# Patient Record
Sex: FEMALE | Race: WHITE | Employment: UNEMPLOYED | URBAN - METROPOLITAN AREA
[De-identification: names, ages, dates, MRNs, and addresses within clinical notes are randomized per-mention and may not be internally consistent; named-entity substitution may affect disease eponyms.]

---

## 2017-06-07 ENCOUNTER — HOSPITAL ENCOUNTER (EMERGENCY)
Age: 47
Discharge: HOME | End: 2017-06-07
Attending: EMERGENCY MEDICINE

## 2017-06-07 ENCOUNTER — APPOINTMENT (OUTPATIENT)
Dept: GENERAL RADIOLOGY | Age: 47
End: 2017-06-07

## 2017-06-07 VITALS
BODY MASS INDEX: 23.32 KG/M2 | DIASTOLIC BLOOD PRESSURE: 70 MMHG | WEIGHT: 140 LBS | HEIGHT: 65 IN | OXYGEN SATURATION: 98 % | TEMPERATURE: 98.5 F | HEART RATE: 91 BPM | RESPIRATION RATE: 16 BRPM | SYSTOLIC BLOOD PRESSURE: 153 MMHG

## 2017-06-07 DIAGNOSIS — J40 BRONCHITIS: Primary | ICD-10-CM

## 2017-06-07 DIAGNOSIS — J45.901 ASTHMA FLARE: ICD-10-CM

## 2017-06-07 PROCEDURE — 6370000000 HC RX 637 (ALT 250 FOR IP): Performed by: EMERGENCY MEDICINE

## 2017-06-07 PROCEDURE — 6360000002 HC RX W HCPCS: Performed by: EMERGENCY MEDICINE

## 2017-06-07 PROCEDURE — 6370000000 HC RX 637 (ALT 250 FOR IP)

## 2017-06-07 PROCEDURE — 71020 XR CHEST STANDARD TWO VW: CPT

## 2017-06-07 PROCEDURE — 94640 AIRWAY INHALATION TREATMENT: CPT

## 2017-06-07 PROCEDURE — 99284 EMERGENCY DEPT VISIT MOD MDM: CPT

## 2017-06-07 RX ORDER — PREDNISONE 20 MG/1
40 TABLET ORAL DAILY
Qty: 10 TABLET | Refills: 0 | Status: SHIPPED | OUTPATIENT
Start: 2017-06-07 | End: 2017-06-12

## 2017-06-07 RX ORDER — AZITHROMYCIN 250 MG/1
TABLET, FILM COATED ORAL
Qty: 6 TABLET | Refills: 0 | Status: SHIPPED | OUTPATIENT
Start: 2017-06-07 | End: 2017-06-07

## 2017-06-07 RX ORDER — IPRATROPIUM BROMIDE AND ALBUTEROL SULFATE 2.5; .5 MG/3ML; MG/3ML
SOLUTION RESPIRATORY (INHALATION)
Status: COMPLETED
Start: 2017-06-07 | End: 2017-06-07

## 2017-06-07 RX ORDER — IPRATROPIUM BROMIDE AND ALBUTEROL SULFATE 2.5; .5 MG/3ML; MG/3ML
1 SOLUTION RESPIRATORY (INHALATION) ONCE
Status: COMPLETED | OUTPATIENT
Start: 2017-06-07 | End: 2017-06-07

## 2017-06-07 RX ORDER — SULFAMETHOXAZOLE AND TRIMETHOPRIM 800; 160 MG/1; MG/1
1 TABLET ORAL 2 TIMES DAILY
Qty: 20 TABLET | Refills: 0 | Status: SHIPPED | OUTPATIENT
Start: 2017-06-07 | End: 2017-06-17

## 2017-06-07 RX ORDER — IBUPROFEN 600 MG/1
600 TABLET ORAL ONCE
Status: COMPLETED | OUTPATIENT
Start: 2017-06-07 | End: 2017-06-07

## 2017-06-07 RX ORDER — DEXAMETHASONE 4 MG/1
8 TABLET ORAL ONCE
Status: COMPLETED | OUTPATIENT
Start: 2017-06-07 | End: 2017-06-07

## 2017-06-07 RX ADMIN — IPRATROPIUM BROMIDE AND ALBUTEROL SULFATE 1 AMPULE: 2.5; .5 SOLUTION RESPIRATORY (INHALATION) at 11:49

## 2017-06-07 RX ADMIN — DEXAMETHASONE 8 MG: 4 TABLET ORAL at 11:50

## 2017-06-07 RX ADMIN — IPRATROPIUM BROMIDE AND ALBUTEROL SULFATE 1 AMPULE: .5; 3 SOLUTION RESPIRATORY (INHALATION) at 13:15

## 2017-06-07 RX ADMIN — IBUPROFEN 600 MG: 600 TABLET ORAL at 13:13

## 2017-06-07 RX ADMIN — IPRATROPIUM BROMIDE AND ALBUTEROL SULFATE 1 AMPULE: .5; 3 SOLUTION RESPIRATORY (INHALATION) at 11:49

## 2017-06-07 ASSESSMENT — PAIN DESCRIPTION - ORIENTATION: ORIENTATION: POSTERIOR

## 2017-06-07 ASSESSMENT — ENCOUNTER SYMPTOMS
SINUS PRESSURE: 0
ABDOMINAL PAIN: 0
VOMITING: 0
TROUBLE SWALLOWING: 0
CHOKING: 0
EYE DISCHARGE: 0
VOICE CHANGE: 0
COUGH: 0
EYE PAIN: 0
FACIAL SWELLING: 0
WHEEZING: 1
DIARRHEA: 0
BACK PAIN: 0
SHORTNESS OF BREATH: 1
EYE REDNESS: 0
SORE THROAT: 0
RHINORRHEA: 1
CHEST TIGHTNESS: 0
BLOOD IN STOOL: 0
CONSTIPATION: 0
STRIDOR: 0

## 2017-06-07 ASSESSMENT — PAIN DESCRIPTION - PAIN TYPE: TYPE: ACUTE PAIN

## 2017-06-07 ASSESSMENT — PAIN DESCRIPTION - DESCRIPTORS: DESCRIPTORS: BURNING

## 2017-06-07 ASSESSMENT — PAIN DESCRIPTION - LOCATION: LOCATION: CHEST

## 2017-06-07 ASSESSMENT — PAIN SCALES - GENERAL
PAINLEVEL_OUTOF10: 6
PAINLEVEL_OUTOF10: 6

## 2017-06-07 ASSESSMENT — PAIN SCALES - WONG BAKER: WONGBAKER_NUMERICALRESPONSE: 6

## 2018-01-23 ENCOUNTER — OFFICE VISIT (OUTPATIENT)
Dept: FAMILY MEDICINE CLINIC | Age: 48
End: 2018-01-23
Payer: COMMERCIAL

## 2018-01-23 VITALS
BODY MASS INDEX: 26.33 KG/M2 | RESPIRATION RATE: 16 BRPM | TEMPERATURE: 98.5 F | HEIGHT: 65 IN | SYSTOLIC BLOOD PRESSURE: 136 MMHG | HEART RATE: 89 BPM | WEIGHT: 158 LBS | DIASTOLIC BLOOD PRESSURE: 88 MMHG | OXYGEN SATURATION: 98 %

## 2018-01-23 DIAGNOSIS — J20.8 ACUTE BRONCHITIS DUE TO OTHER SPECIFIED ORGANISMS: ICD-10-CM

## 2018-01-23 DIAGNOSIS — Z72.0 TOBACCO ABUSE: ICD-10-CM

## 2018-01-23 DIAGNOSIS — J45.901 ASTHMA EXACERBATION, MILD: ICD-10-CM

## 2018-01-23 DIAGNOSIS — R05.9 COUGH: Primary | ICD-10-CM

## 2018-01-23 LAB
INFLUENZA A ANTIBODY: NORMAL
INFLUENZA B ANTIBODY: NORMAL

## 2018-01-23 PROCEDURE — 87804 INFLUENZA ASSAY W/OPTIC: CPT | Performed by: FAMILY MEDICINE

## 2018-01-23 PROCEDURE — 99214 OFFICE O/P EST MOD 30 MIN: CPT | Performed by: FAMILY MEDICINE

## 2018-01-23 PROCEDURE — 4004F PT TOBACCO SCREEN RCVD TLK: CPT | Performed by: FAMILY MEDICINE

## 2018-01-23 PROCEDURE — G8484 FLU IMMUNIZE NO ADMIN: HCPCS | Performed by: FAMILY MEDICINE

## 2018-01-23 PROCEDURE — G8419 CALC BMI OUT NRM PARAM NOF/U: HCPCS | Performed by: FAMILY MEDICINE

## 2018-01-23 PROCEDURE — G8427 DOCREV CUR MEDS BY ELIG CLIN: HCPCS | Performed by: FAMILY MEDICINE

## 2018-01-23 RX ORDER — AZITHROMYCIN 250 MG/1
TABLET, FILM COATED ORAL
Qty: 1 PACKET | Refills: 0 | Status: SHIPPED | OUTPATIENT
Start: 2018-01-23 | End: 2018-02-02

## 2018-01-23 RX ORDER — METHYLPREDNISOLONE 4 MG/1
TABLET ORAL
Qty: 1 KIT | Refills: 0 | Status: SHIPPED | OUTPATIENT
Start: 2018-01-23 | End: 2018-01-29

## 2018-01-23 ASSESSMENT — PATIENT HEALTH QUESTIONNAIRE - PHQ9
2. FEELING DOWN, DEPRESSED OR HOPELESS: 0
1. LITTLE INTEREST OR PLEASURE IN DOING THINGS: 0
SUM OF ALL RESPONSES TO PHQ QUESTIONS 1-9: 0
SUM OF ALL RESPONSES TO PHQ9 QUESTIONS 1 & 2: 0

## 2018-01-23 NOTE — PROGRESS NOTES
1/23/2018   Medication Sig Dispense Refill    methylPREDNISolone (MEDROL DOSEPACK) 4 MG tablet Take by mouth. 1 kit 0    azithromycin (ZITHROMAX) 250 MG tablet Take 2 tabs (500 mg) on Day 1, and take 1 tab (250 mg) on days 2 through 5. 1 packet 0    albuterol sulfate  (90 BASE) MCG/ACT inhaler Inhale 2 puffs into the lungs every 6 hours as needed for Wheezing 1 Inhaler 5    ipratropium-albuterol (DUONEB) 0.5-2.5 (3) MG/3ML SOLN nebulizer solution Inhale 3 mLs into the lungs every 6 hours 360 mL 5    acetaminophen (TYLENOL) 500 MG tablet Take 500 mg by mouth every 6 hours as needed for Pain      albuterol (PROVENTIL) (2.5 MG/3ML) 0.083% nebulizer solution Take 3 mLs by nebulization every 6 hours as needed for Wheezing. 120 each 3    [DISCONTINUED] loratadine-pseudoephedrine (CLARITIN-D 12HR) 5-120 MG per extended release tablet Take 1 tablet by mouth 2 times daily 20 tablet 0    [DISCONTINUED] traMADol (ULTRAM) 50 MG tablet 1-2 tabs PO q 6 hours prn pain 40 tablet 0    [DISCONTINUED] ibuprofen (ADVIL;MOTRIN) 800 MG tablet take 1 tablet by mouth every 8 hours if needed for pain 90 tablet 1    nicotine (NICODERM CQ) 21 MG/24HR Place 1 patch onto the skin every 24 hours. 30 patch 3     No facility-administered encounter medications on file as of 1/23/2018. stop smoking  Patient declines further testing  Recommend stay current with health maintenance      Return if symptoms worsen or fail to improve. Patient education provided. They understand and agree with this course of treatment. They will return with new or worsening symptoms. Patient instructed to remain current with appropriate annual health maintenance.

## 2018-01-25 ENCOUNTER — TELEPHONE (OUTPATIENT)
Dept: FAMILY MEDICINE CLINIC | Age: 48
End: 2018-01-25

## 2018-01-25 NOTE — TELEPHONE ENCOUNTER
Patient was in office 01/23/2018 and was given medications and has been taking them since day of visit, 48 hours and she has not improved and is feeling worse would like some advice on what she should do     please advise     pt ph: [phone]

## 2018-01-29 ENCOUNTER — TELEPHONE (OUTPATIENT)
Dept: FAMILY MEDICINE CLINIC | Age: 48
End: 2018-01-29

## 2018-01-30 ENCOUNTER — TELEPHONE (OUTPATIENT)
Dept: FAMILY MEDICINE CLINIC | Age: 48
End: 2018-01-30

## 2018-01-30 RX ORDER — NICOTINE 21 MG/24HR
1 PATCH, TRANSDERMAL 24 HOURS TRANSDERMAL EVERY 24 HOURS
Qty: 30 PATCH | Refills: 3 | Status: SHIPPED | OUTPATIENT
Start: 2018-01-30 | End: 2018-04-02

## 2018-01-30 RX ORDER — ALBUTEROL SULFATE 2.5 MG/3ML
2.5 SOLUTION RESPIRATORY (INHALATION) EVERY 6 HOURS PRN
Qty: 120 EACH | Refills: 3 | Status: SHIPPED | OUTPATIENT
Start: 2018-01-30

## 2018-02-02 ENCOUNTER — OFFICE VISIT (OUTPATIENT)
Dept: FAMILY MEDICINE CLINIC | Age: 48
End: 2018-02-02
Payer: COMMERCIAL

## 2018-02-02 VITALS
SYSTOLIC BLOOD PRESSURE: 130 MMHG | HEIGHT: 65 IN | WEIGHT: 156 LBS | DIASTOLIC BLOOD PRESSURE: 78 MMHG | BODY MASS INDEX: 25.99 KG/M2 | RESPIRATION RATE: 16 BRPM | OXYGEN SATURATION: 99 % | TEMPERATURE: 97.5 F | HEART RATE: 95 BPM

## 2018-02-02 DIAGNOSIS — M54.50 CHRONIC BILATERAL LOW BACK PAIN WITHOUT SCIATICA: ICD-10-CM

## 2018-02-02 DIAGNOSIS — G89.29 CHRONIC BILATERAL LOW BACK PAIN WITHOUT SCIATICA: ICD-10-CM

## 2018-02-02 DIAGNOSIS — M94.0 COSTOCHONDRITIS, ACUTE: ICD-10-CM

## 2018-02-02 DIAGNOSIS — F32.A DEPRESSION, UNSPECIFIED DEPRESSION TYPE: ICD-10-CM

## 2018-02-02 DIAGNOSIS — J20.9 ACUTE BRONCHITIS, UNSPECIFIED ORGANISM: Primary | ICD-10-CM

## 2018-02-02 DIAGNOSIS — J45.901 ASTHMA EXACERBATION, MILD: ICD-10-CM

## 2018-02-02 DIAGNOSIS — Z72.0 TOBACCO ABUSE: ICD-10-CM

## 2018-02-02 PROCEDURE — G8484 FLU IMMUNIZE NO ADMIN: HCPCS | Performed by: FAMILY MEDICINE

## 2018-02-02 PROCEDURE — 99214 OFFICE O/P EST MOD 30 MIN: CPT | Performed by: FAMILY MEDICINE

## 2018-02-02 PROCEDURE — G8419 CALC BMI OUT NRM PARAM NOF/U: HCPCS | Performed by: FAMILY MEDICINE

## 2018-02-02 PROCEDURE — 4004F PT TOBACCO SCREEN RCVD TLK: CPT | Performed by: FAMILY MEDICINE

## 2018-02-02 PROCEDURE — G8427 DOCREV CUR MEDS BY ELIG CLIN: HCPCS | Performed by: FAMILY MEDICINE

## 2018-02-02 RX ORDER — LOSARTAN POTASSIUM AND HYDROCHLOROTHIAZIDE 12.5; 5 MG/1; MG/1
1 TABLET ORAL DAILY
COMMUNITY
End: 2018-03-05 | Stop reason: ALTCHOICE

## 2018-02-02 RX ORDER — DOXYCYCLINE 100 MG/1
CAPSULE ORAL
COMMUNITY
End: 2018-02-05 | Stop reason: ALTCHOICE

## 2018-02-02 RX ORDER — NICOTINE 21 MG/24HR
1 PATCH, TRANSDERMAL 24 HOURS TRANSDERMAL
COMMUNITY
Start: 2014-01-16 | End: 2018-02-02

## 2018-02-02 RX ORDER — METOPROLOL SUCCINATE 25 MG/1
25 TABLET, EXTENDED RELEASE ORAL DAILY
COMMUNITY
End: 2018-03-05 | Stop reason: ALTCHOICE

## 2018-02-02 RX ORDER — IPRATROPIUM BROMIDE AND ALBUTEROL SULFATE 2.5; .5 MG/3ML; MG/3ML
3 SOLUTION RESPIRATORY (INHALATION)
COMMUNITY
Start: 2014-01-16 | End: 2018-02-02

## 2018-02-05 ENCOUNTER — OFFICE VISIT (OUTPATIENT)
Dept: CARDIOLOGY CLINIC | Age: 48
End: 2018-02-05
Payer: COMMERCIAL

## 2018-02-05 VITALS
HEIGHT: 65 IN | BODY MASS INDEX: 26.49 KG/M2 | WEIGHT: 159 LBS | DIASTOLIC BLOOD PRESSURE: 82 MMHG | SYSTOLIC BLOOD PRESSURE: 134 MMHG | HEART RATE: 84 BPM | OXYGEN SATURATION: 97 %

## 2018-02-05 DIAGNOSIS — F17.200 SMOKER: ICD-10-CM

## 2018-02-05 DIAGNOSIS — I10 HYPERTENSION, UNSPECIFIED TYPE: ICD-10-CM

## 2018-02-05 DIAGNOSIS — R07.9 CHEST PAIN, UNSPECIFIED TYPE: Primary | ICD-10-CM

## 2018-02-05 DIAGNOSIS — Z09 HOSPITAL DISCHARGE FOLLOW-UP: ICD-10-CM

## 2018-02-05 PROCEDURE — G8484 FLU IMMUNIZE NO ADMIN: HCPCS | Performed by: INTERNAL MEDICINE

## 2018-02-05 PROCEDURE — 4004F PT TOBACCO SCREEN RCVD TLK: CPT | Performed by: INTERNAL MEDICINE

## 2018-02-05 PROCEDURE — G8419 CALC BMI OUT NRM PARAM NOF/U: HCPCS | Performed by: INTERNAL MEDICINE

## 2018-02-05 PROCEDURE — 99214 OFFICE O/P EST MOD 30 MIN: CPT | Performed by: INTERNAL MEDICINE

## 2018-02-05 PROCEDURE — G8427 DOCREV CUR MEDS BY ELIG CLIN: HCPCS | Performed by: INTERNAL MEDICINE

## 2018-02-05 ASSESSMENT — ENCOUNTER SYMPTOMS
CHEST TIGHTNESS: 0
APNEA: 0
SHORTNESS OF BREATH: 1

## 2018-02-13 ENCOUNTER — TELEPHONE (OUTPATIENT)
Dept: FAMILY MEDICINE CLINIC | Age: 48
End: 2018-02-13

## 2018-02-13 NOTE — TELEPHONE ENCOUNTER
Pt started new meds last night in connection with the lung program. She took prednisone and used albuterol inhaler. She was advised today by RN at lung program to begin nebulizer treatments and to contact our office. Pt c/o sob and sounds breathless and anxious. She states she is nervous and afraid. She reports O2 of 97 and pulse 94. After talking with her and calming her she is more relaxed and breathing has calmed somewhat. She is advised to go to er if symptoms increase and to cover her face with a scarf.

## 2018-02-19 ENCOUNTER — OFFICE VISIT (OUTPATIENT)
Dept: FAMILY MEDICINE CLINIC | Age: 48
End: 2018-02-19
Payer: COMMERCIAL

## 2018-02-19 VITALS
RESPIRATION RATE: 18 BRPM | HEIGHT: 65 IN | HEART RATE: 78 BPM | WEIGHT: 164 LBS | SYSTOLIC BLOOD PRESSURE: 132 MMHG | TEMPERATURE: 98.3 F | BODY MASS INDEX: 27.32 KG/M2 | DIASTOLIC BLOOD PRESSURE: 86 MMHG

## 2018-02-19 DIAGNOSIS — Z72.0 TOBACCO ABUSE: ICD-10-CM

## 2018-02-19 DIAGNOSIS — E87.6 HYPOKALEMIA: ICD-10-CM

## 2018-02-19 DIAGNOSIS — M94.0 COSTOCHONDRITIS, ACUTE: ICD-10-CM

## 2018-02-19 DIAGNOSIS — J45.901 ASTHMA EXACERBATION, MILD: Primary | ICD-10-CM

## 2018-02-19 DIAGNOSIS — F32.A DEPRESSION, UNSPECIFIED DEPRESSION TYPE: ICD-10-CM

## 2018-02-19 DIAGNOSIS — M54.50 CHRONIC BILATERAL LOW BACK PAIN WITHOUT SCIATICA: ICD-10-CM

## 2018-02-19 DIAGNOSIS — R07.9 CHEST PAIN, UNSPECIFIED TYPE: ICD-10-CM

## 2018-02-19 DIAGNOSIS — G89.29 CHRONIC BILATERAL LOW BACK PAIN WITHOUT SCIATICA: ICD-10-CM

## 2018-02-19 LAB
ANION GAP SERPL CALCULATED.3IONS-SCNC: 15 MEQ/L (ref 7–13)
BUN BLDV-MCNC: 20 MG/DL (ref 6–20)
CALCIUM SERPL-MCNC: 9.3 MG/DL (ref 8.6–10.2)
CHLORIDE BLD-SCNC: 100 MEQ/L (ref 98–107)
CO2: 25 MEQ/L (ref 22–29)
CREAT SERPL-MCNC: 0.68 MG/DL (ref 0.5–0.9)
GFR AFRICAN AMERICAN: >60
GFR NON-AFRICAN AMERICAN: >60
GLUCOSE BLD-MCNC: 76 MG/DL (ref 74–109)
POTASSIUM SERPL-SCNC: 5.3 MEQ/L (ref 3.5–5.1)
SODIUM BLD-SCNC: 140 MEQ/L (ref 132–144)

## 2018-02-19 PROCEDURE — G8484 FLU IMMUNIZE NO ADMIN: HCPCS | Performed by: FAMILY MEDICINE

## 2018-02-19 PROCEDURE — G8419 CALC BMI OUT NRM PARAM NOF/U: HCPCS | Performed by: FAMILY MEDICINE

## 2018-02-19 PROCEDURE — G8427 DOCREV CUR MEDS BY ELIG CLIN: HCPCS | Performed by: FAMILY MEDICINE

## 2018-02-19 PROCEDURE — 99214 OFFICE O/P EST MOD 30 MIN: CPT | Performed by: FAMILY MEDICINE

## 2018-02-19 PROCEDURE — 4004F PT TOBACCO SCREEN RCVD TLK: CPT | Performed by: FAMILY MEDICINE

## 2018-02-19 RX ORDER — IPRATROPIUM BROMIDE AND ALBUTEROL SULFATE 2.5; .5 MG/3ML; MG/3ML
1 SOLUTION RESPIRATORY (INHALATION) EVERY 6 HOURS
Qty: 360 ML | Refills: 5 | Status: SHIPPED | OUTPATIENT
Start: 2018-02-19

## 2018-02-19 NOTE — PROGRESS NOTES
tenderness, deformity or swelling  Extremities - peripheral pulses normal, no pedal edema, no clubbing or cyanosis  Skin - normal coloration and turgor, no rashes, no suspicious skin lesions noted  Influenza testing negative  Patient declines blood work or chest x-ray;    Assessment:    1. Asthma exacerbation, mild     2. Costochondritis, acute     3. Depression, unspecified depression type     4. Chronic bilateral low back pain without sciatica     5. Tobacco abuse     6. Chest pain, unspecified type     7. Hypokalemia  Basic Metabolic Panel       Plan:    Orders Placed This Encounter   Procedures    Basic Metabolic Panel     Standing Status:   Future     Number of Occurrences:   1     Standing Expiration Date:   2/19/2019       Outpatient Encounter Prescriptions as of 2/19/2018   Medication Sig Dispense Refill    ipratropium-albuterol (DUONEB) 0.5-2.5 (3) MG/3ML SOLN nebulizer solution Inhale 3 mLs into the lungs every 6 hours 360 mL 5    losartan-hydrochlorothiazide (HYZAAR) 50-12.5 MG per tablet Take 1 tablet by mouth daily       metoprolol succinate (TOPROL XL) 25 MG extended release tablet Take 25 mg by mouth daily      albuterol (PROVENTIL) (2.5 MG/3ML) 0.083% nebulizer solution Take 3 mLs by nebulization every 6 hours as needed for Wheezing 120 each 3    nicotine (NICODERM CQ) 14 MG/24HR Place 1 patch onto the skin every 24 hours 30 patch 3    albuterol sulfate  (90 BASE) MCG/ACT inhaler Inhale 2 puffs into the lungs every 6 hours as needed for Wheezing 1 Inhaler 5    [DISCONTINUED] ipratropium-albuterol (DUONEB) 0.5-2.5 (3) MG/3ML SOLN nebulizer solution Inhale 3 mLs into the lungs every 6 hours 360 mL 5    acetaminophen (TYLENOL) 500 MG tablet Take 500 mg by mouth every 6 hours as needed for Pain       No facility-administered encounter medications on file as of 2/19/2018.          stop smoking  Patient declines further testing  Recommend stay current with health maintenance      Return in

## 2018-02-20 ENCOUNTER — TELEPHONE (OUTPATIENT)
Dept: FAMILY MEDICINE CLINIC | Age: 48
End: 2018-02-20

## 2018-02-20 NOTE — TELEPHONE ENCOUNTER
PT HAS CALLED In, WANTING TO KNOW RESULTS OF THE [facility]         PLEASE GIVE ALL      PT Ph: [phone]     [facility] YOU

## 2018-03-05 ENCOUNTER — OFFICE VISIT (OUTPATIENT)
Dept: CARDIOLOGY CLINIC | Age: 48
End: 2018-03-05
Payer: COMMERCIAL

## 2018-03-05 VITALS
WEIGHT: 163.2 LBS | HEIGHT: 65 IN | SYSTOLIC BLOOD PRESSURE: 144 MMHG | BODY MASS INDEX: 27.19 KG/M2 | OXYGEN SATURATION: 98 % | DIASTOLIC BLOOD PRESSURE: 92 MMHG | HEART RATE: 100 BPM

## 2018-03-05 DIAGNOSIS — I10 HYPERTENSION, UNSPECIFIED TYPE: Primary | ICD-10-CM

## 2018-03-05 PROCEDURE — G8484 FLU IMMUNIZE NO ADMIN: HCPCS | Performed by: INTERNAL MEDICINE

## 2018-03-05 PROCEDURE — G8419 CALC BMI OUT NRM PARAM NOF/U: HCPCS | Performed by: INTERNAL MEDICINE

## 2018-03-05 PROCEDURE — 99213 OFFICE O/P EST LOW 20 MIN: CPT | Performed by: INTERNAL MEDICINE

## 2018-03-05 PROCEDURE — 4004F PT TOBACCO SCREEN RCVD TLK: CPT | Performed by: INTERNAL MEDICINE

## 2018-03-05 PROCEDURE — G8427 DOCREV CUR MEDS BY ELIG CLIN: HCPCS | Performed by: INTERNAL MEDICINE

## 2018-03-05 ASSESSMENT — ENCOUNTER SYMPTOMS
SHORTNESS OF BREATH: 1
CHEST TIGHTNESS: 0
APNEA: 0

## 2018-03-05 NOTE — PROGRESS NOTES
Reason For Visit:  Chief Complaint   Patient presents with    1 Month Follow-Up       HPI:  3/5/18: Patient presents today for Recent hospitalization for chest pain. Doing better. Starting Chantix. Blood pressure is better no heart failure. EKG is LVH pattern he'll see me in 6 months. 2/5/18: Patient presents today for Follow-up of recent hospitalization for chest pain. She used to be a ..Used to stress her Now she works a different job and she is very happy. She is . She was a smoker one pack per day and has cut it down to 5 cigarettes a day. Has hypertension. EKG had shown LVH or a normal variant. Continue with the losartan hydrochlorothiazide. Set her up for stress Cardiolite and echo and then see me.       Problem List:  Patient Active Problem List   Diagnosis    Asthma exacerbation, mild    Costochondritis, acute    Chest pain    Chronic lumbar pain    Depression    Tobacco abuse       Allergies: Allergies   Allergen Reactions    Latex     Pcn [Penicillins]        Personal History:   has a past medical history of Anxiety; Asthma; and Fibromyalgia. Social History:   reports that she has been smoking Cigarettes. She has been smoking about 0.50 packs per day. She has never used smokeless tobacco. She reports that she drinks alcohol. She reports that she does not use drugs. Surgical History:  Past Surgical History:   Procedure Laterality Date    BACK SURGERY      KNEE SURGERY Right     2012    OTHER SURGICAL HISTORY      3 VAGINAL BIRTHS    TUBAL LIGATION      ENDOMETRIOSIS       Family History:  family history includes Arthritis in her mother; Asthma in her mother.       Current Medications:    Current Outpatient Prescriptions:     ipratropium-albuterol (DUONEB) 0.5-2.5 (3) MG/3ML SOLN nebulizer solution, Inhale 3 mLs into the lungs every 6 hours, Disp: 360 mL, Rfl: 5    albuterol (PROVENTIL) (2.5 MG/3ML) 0.083% nebulizer solution, Take 3 mLs by nebulization every 6 hours as needed for Wheezing, Disp: 120 each, Rfl: 3    nicotine (NICODERM CQ) 14 MG/24HR, Place 1 patch onto the skin every 24 hours, Disp: 30 patch, Rfl: 3    albuterol sulfate  (90 BASE) MCG/ACT inhaler, Inhale 2 puffs into the lungs every 6 hours as needed for Wheezing, Disp: 1 Inhaler, Rfl: 5    acetaminophen (TYLENOL) 500 MG tablet, Take 500 mg by mouth every 6 hours as needed for Pain, Disp: , Rfl:       Review of Systems:  Review of Systems   Constitutional: Negative for appetite change, diaphoresis and fatigue. Respiratory: Positive for shortness of breath. Negative for apnea and chest tightness. Cardiovascular: Negative for chest pain, palpitations and leg swelling. Skin: Negative. Neurological: Negative for dizziness, syncope, weakness, light-headedness and headaches. Psychiatric/Behavioral: Negative for agitation, behavioral problems, confusion and decreased concentration. The patient is not nervous/anxious and is not hyperactive. All other systems reviewed and are negative. Objective  Vitals:    03/05/18 1556   BP: (!) 144/92   Site: Right Arm   Position: Sitting   Cuff Size: Small Adult   Pulse: 100   SpO2: 98%   Weight: 163 lb 3.2 oz (74 kg)   Height: 5' 5\" (1.651 m)         Physical Exam:  Physical Exam   Constitutional: She is oriented to person, place, and time. She appears well-developed and well-nourished. HENT:   Head: Normocephalic and atraumatic. Right Ear: External ear normal.   Left Ear: External ear normal.   Mouth/Throat: Oropharynx is clear and moist.   Eyes: Conjunctivae and EOM are normal. Pupils are equal, round, and reactive to light. Neck: Normal range of motion. Neck supple. Cardiovascular: Normal rate, regular rhythm, normal heart sounds and intact distal pulses. Pulmonary/Chest: Effort normal and breath sounds normal.   Abdominal: Soft. Bowel sounds are normal.   Musculoskeletal: Normal range of motion.    Neurological: She is alert and oriented to person, place, and time. She has normal reflexes. Skin: Skin is warm and dry. Psychiatric: She has a normal mood and affect. Her behavior is normal. Judgment and thought content normal.           Assessment/Orders:       ICD-10-CM ICD-9-CM    1. Hypertension, unspecified type I10 401.9        No orders of the defined types were placed in this encounter. Medications Discontinued During This Encounter   Medication Reason    losartan-hydrochlorothiazide (HYZAAR) 50-12.5 MG per tablet Discontinued by another clinician    metoprolol succinate (TOPROL XL) 25 MG extended release tablet Discontinued by another clinician       No orders of the defined types were placed in this encounter. Plan:  1. Patient to see me in 1 month  > I will continue to monitor patient clinically, if symptoms develop or worsen, they are to let me know ASAP or head to the nearest emergeny room. > Follow up as discussed or call office sooner if needed.  > If refills are needed after appointment contact pharmacy.         Electronically signed by: Stephanie Piña MD  3/5/2018 4:21 PM

## 2018-03-08 ENCOUNTER — OFFICE VISIT (OUTPATIENT)
Dept: PULMONOLOGY | Age: 48
End: 2018-03-08
Payer: COMMERCIAL

## 2018-03-08 VITALS
DIASTOLIC BLOOD PRESSURE: 62 MMHG | SYSTOLIC BLOOD PRESSURE: 128 MMHG | WEIGHT: 149 LBS | TEMPERATURE: 96.9 F | BODY MASS INDEX: 24.83 KG/M2 | HEIGHT: 65 IN | OXYGEN SATURATION: 93 % | HEART RATE: 101 BPM

## 2018-03-08 DIAGNOSIS — J43.2 CENTRILOBULAR EMPHYSEMA (HCC): ICD-10-CM

## 2018-03-08 DIAGNOSIS — J45.51 SEVERE PERSISTENT ASTHMA WITH ACUTE EXACERBATION: ICD-10-CM

## 2018-03-08 DIAGNOSIS — J45.51 SEVERE PERSISTENT ASTHMA WITH ACUTE EXACERBATION: Primary | ICD-10-CM

## 2018-03-08 DIAGNOSIS — F17.200 SMOKING: ICD-10-CM

## 2018-03-08 PROCEDURE — 3023F SPIROM DOC REV: CPT | Performed by: INTERNAL MEDICINE

## 2018-03-08 PROCEDURE — G8926 SPIRO NO PERF OR DOC: HCPCS | Performed by: INTERNAL MEDICINE

## 2018-03-08 PROCEDURE — 4004F PT TOBACCO SCREEN RCVD TLK: CPT | Performed by: INTERNAL MEDICINE

## 2018-03-08 PROCEDURE — G8420 CALC BMI NORM PARAMETERS: HCPCS | Performed by: INTERNAL MEDICINE

## 2018-03-08 PROCEDURE — G8427 DOCREV CUR MEDS BY ELIG CLIN: HCPCS | Performed by: INTERNAL MEDICINE

## 2018-03-08 PROCEDURE — 99205 OFFICE O/P NEW HI 60 MIN: CPT | Performed by: INTERNAL MEDICINE

## 2018-03-08 PROCEDURE — G8484 FLU IMMUNIZE NO ADMIN: HCPCS | Performed by: INTERNAL MEDICINE

## 2018-03-08 RX ORDER — FLUTICASONE FUROATE AND VILANTEROL 200; 25 UG/1; UG/1
1 POWDER RESPIRATORY (INHALATION) DAILY
Qty: 1 EACH | Refills: 2 | Status: SHIPPED | OUTPATIENT
Start: 2018-03-08 | End: 2018-09-18 | Stop reason: SDUPTHER

## 2018-03-08 RX ORDER — VARENICLINE TARTRATE 25 MG
KIT ORAL
Qty: 1 EACH | Refills: 0 | Status: SHIPPED | OUTPATIENT
Start: 2018-03-08 | End: 2018-04-02

## 2018-03-08 RX ORDER — PREDNISONE 10 MG/1
TABLET ORAL
Qty: 40 TABLET | Refills: 0 | Status: SHIPPED | OUTPATIENT
Start: 2018-03-08 | End: 2018-03-26 | Stop reason: ALTCHOICE

## 2018-03-08 NOTE — LETTER
[facility]  [address]  Phone: [phone]  Fax: [phone]    Sachi Johnson MD        March 8, 2018     Patient: Gio Hendrickson   YOB: 1970   Date of Visit: 3/8/2018       To Whom It May Concern: It is my medical opinion that Gio Hendrickson may return to work on 03/12/2018. If you have any questions or concerns, please don't hesitate to call.     Sincerely,        Sachi Johnson MD

## 2018-03-09 LAB — ALPHA-1 ANTITRYPSIN: 132 MG/DL (ref 90–200)

## 2018-03-10 LAB
ALLERGEN ASPERGILLUS ALTERNATA IGE: <0.1 KU/L
ALLERGEN ASPERGILLUS FUMIGATUS IGE: <0.1 KU/L
ALLERGEN BERMUDA GRASS IGE: <0.1 KU/L
ALLERGEN CAT DANDER IGE: <0.1 KU/L
ALLERGEN COMMON SHORT RAGWEED IGE: <0.1 KU/L
ALLERGEN COTTONWOOD: <0.1 KU/L
ALLERGEN COW EPITHELIA/DANDER IGE: <0.1 KU/L
ALLERGEN DOG DANDER IGE: <0.1 KU/L
ALLERGEN ELM IGE: <0.1 KU/L
ALLERGEN ENGLISH PLANTAIN: <0.1 KU/L
ALLERGEN GREER HOUSE DUST: <0.1 KU/L
ALLERGEN HORMODENDRUM HORDEI IGE: <0.1 KU/L
ALLERGEN HORSE DANDER: <0.1 KU/L
ALLERGEN JOHNSON GRASS IGE: <0.1 KU/L
ALLERGEN JUNE KENTUCKY BLUEGRASS: <0.1 KU/L
ALLERGEN LAMB'S QUARTERS: <0.1 KU/L
ALLERGEN MESQUITE IGE: <0.1 KU/L
ALLERGEN MITE DUST FARINAE IGE: <0.1 KU/L
ALLERGEN MITE DUST PTERONYSSINUS IGE: <0.1 KU/L
ALLERGEN MOUNTAIN CEDAR: <0.1 KU/L
ALLERGEN MUGWORT IGE: <0.1 KU/L
ALLERGEN OAK TREE IGE: <0.1 KU/L
ALLERGEN OLIVE TREE IGE: <0.1 KU/L
ALLERGEN PENICILLIUM NOTATUM: <0.1 KU/L
ALLERGEN PERENNIAL RYE GRASS IGE: <0.1 KU/L
ALLERGEN RUSSIAN THISTLE IGE: <0.1 KU/L
ALLERGEN SEE NOTE: NORMAL
ALLERGEN TIMOTHY GRASS: <0.1 KU/L
IGE: 15 KU/L

## 2018-03-26 ENCOUNTER — OFFICE VISIT (OUTPATIENT)
Dept: PULMONOLOGY | Age: 48
End: 2018-03-26
Payer: COMMERCIAL

## 2018-03-26 VITALS
SYSTOLIC BLOOD PRESSURE: 127 MMHG | HEART RATE: 95 BPM | DIASTOLIC BLOOD PRESSURE: 92 MMHG | BODY MASS INDEX: 28.32 KG/M2 | WEIGHT: 170 LBS | HEIGHT: 65 IN | TEMPERATURE: 96.8 F | OXYGEN SATURATION: 99 %

## 2018-03-26 DIAGNOSIS — F17.200 SMOKING: ICD-10-CM

## 2018-03-26 DIAGNOSIS — R06.09 DOE (DYSPNEA ON EXERTION): ICD-10-CM

## 2018-03-26 DIAGNOSIS — R09.81 NASAL CONGESTION: ICD-10-CM

## 2018-03-26 DIAGNOSIS — J45.20 MILD INTERMITTENT ASTHMA WITHOUT COMPLICATION: Primary | ICD-10-CM

## 2018-03-26 PROCEDURE — G8427 DOCREV CUR MEDS BY ELIG CLIN: HCPCS | Performed by: INTERNAL MEDICINE

## 2018-03-26 PROCEDURE — 99214 OFFICE O/P EST MOD 30 MIN: CPT | Performed by: INTERNAL MEDICINE

## 2018-03-26 PROCEDURE — G8484 FLU IMMUNIZE NO ADMIN: HCPCS | Performed by: INTERNAL MEDICINE

## 2018-03-26 PROCEDURE — G8419 CALC BMI OUT NRM PARAM NOF/U: HCPCS | Performed by: INTERNAL MEDICINE

## 2018-03-26 PROCEDURE — 4004F PT TOBACCO SCREEN RCVD TLK: CPT | Performed by: INTERNAL MEDICINE

## 2018-03-26 RX ORDER — PREDNISONE 20 MG/1
40 TABLET ORAL DAILY
Qty: 10 TABLET | Refills: 0 | Status: SHIPPED | OUTPATIENT
Start: 2018-03-26 | End: 2018-03-31

## 2018-03-26 RX ORDER — FLUTICASONE PROPIONATE 50 MCG
1 SPRAY, SUSPENSION (ML) NASAL DAILY
Qty: 1 BOTTLE | Refills: 3 | Status: SHIPPED | OUTPATIENT
Start: 2018-03-26 | End: 2021-09-18

## 2018-03-26 NOTE — PROGRESS NOTES
otherwise negative. Objective:     Vitals:    03/26/18 1545   BP: (!) 127/92   Pulse: 95   Temp: 96.8 °F (36 °C)   TempSrc: Tympanic   SpO2: 99%   Weight: 170 lb (77.1 kg)   Height: 5' 5\" (1.651 m)         Physical Exam   Constitutional: She is oriented to person, place, and time. She appears well-developed and well-nourished. No distress. HENT:   Mild nasal congestion    Eyes: Conjunctivae are normal. Pupils are equal, round, and reactive to light. Neck: Normal range of motion. Neck supple. Cardiovascular: Normal rate and regular rhythm. Exam reveals no gallop and no friction rub. No murmur heard. Pulmonary/Chest: Effort normal. No respiratory distress. She has no wheezes. She has no rales. She exhibits no tenderness. Abdominal: Soft. She exhibits no distension. There is no tenderness. There is no rebound. Musculoskeletal: She exhibits no edema or tenderness. Lymphadenopathy:     She has no cervical adenopathy. Neurological: She is alert and oriented to person, place, and time. Skin: Skin is warm and dry. She is not diaphoretic. No erythema. Psychiatric: She has a normal mood and affect. Judgment normal.     Imaging studies reviewed by me negative  Lab results reviewed in chart    ECHO: 2018  normal     Assessment and Plan      1. Mild intermittent asthma without complication  predniSONE (DELTASONE) 20 MG tablet    Umeclidinium Bromide (INCRUSE ELLIPTA) 62.5 MCG/INH AEPB   2. DELAROSA (dyspnea on exertion)  Umeclidinium Bromide (INCRUSE ELLIPTA) 62.5 MCG/INH AEPB   3. Smoking     4.  Nasal congestion  fluticasone (FLONASE) 50 MCG/ACT nasal spray     PFT to be done next month  Asthma symptoms is much controlled  She continue to have DELAROSA and I suspect component of COPD in light of her smoking history (ACOS)   Will add LAMA (Incruse)   Smoking cessation counseling done again   For nasal congestion will use flonase   In light of her throat discomfort and prodromal cold like symptoms I will give

## 2018-04-02 ENCOUNTER — OFFICE VISIT (OUTPATIENT)
Dept: CARDIOLOGY CLINIC | Age: 48
End: 2018-04-02
Payer: COMMERCIAL

## 2018-04-02 ENCOUNTER — TELEPHONE (OUTPATIENT)
Dept: PULMONOLOGY | Age: 48
End: 2018-04-02

## 2018-04-02 VITALS
SYSTOLIC BLOOD PRESSURE: 124 MMHG | DIASTOLIC BLOOD PRESSURE: 86 MMHG | OXYGEN SATURATION: 98 % | BODY MASS INDEX: 27.99 KG/M2 | RESPIRATION RATE: 16 BRPM | HEART RATE: 96 BPM | HEIGHT: 65 IN | WEIGHT: 168 LBS

## 2018-04-02 DIAGNOSIS — R07.9 CHEST PAIN, UNSPECIFIED TYPE: ICD-10-CM

## 2018-04-02 DIAGNOSIS — I10 HYPERTENSION, UNSPECIFIED TYPE: Primary | ICD-10-CM

## 2018-04-02 DIAGNOSIS — J45.901 ASTHMA EXACERBATION, MILD: ICD-10-CM

## 2018-04-02 PROCEDURE — G8419 CALC BMI OUT NRM PARAM NOF/U: HCPCS | Performed by: INTERNAL MEDICINE

## 2018-04-02 PROCEDURE — 99214 OFFICE O/P EST MOD 30 MIN: CPT | Performed by: INTERNAL MEDICINE

## 2018-04-02 PROCEDURE — 4004F PT TOBACCO SCREEN RCVD TLK: CPT | Performed by: INTERNAL MEDICINE

## 2018-04-02 PROCEDURE — G8427 DOCREV CUR MEDS BY ELIG CLIN: HCPCS | Performed by: INTERNAL MEDICINE

## 2018-04-02 ASSESSMENT — ENCOUNTER SYMPTOMS
SHORTNESS OF BREATH: 1
APNEA: 0
COLOR CHANGE: 0
CHEST TIGHTNESS: 0
COUGH: 0

## 2018-04-23 ENCOUNTER — HOSPITAL ENCOUNTER (OUTPATIENT)
Dept: PULMONOLOGY | Age: 48
Discharge: HOME | End: 2018-04-23
Payer: COMMERCIAL

## 2018-04-23 DIAGNOSIS — J45.51 SEVERE PERSISTENT ASTHMA WITH ACUTE EXACERBATION: ICD-10-CM

## 2018-04-23 PROCEDURE — 94060 EVALUATION OF WHEEZING: CPT

## 2018-04-23 PROCEDURE — 94729 DIFFUSING CAPACITY: CPT

## 2018-04-23 PROCEDURE — 94060 EVALUATION OF WHEEZING: CPT | Performed by: INTERNAL MEDICINE

## 2018-04-23 PROCEDURE — 94726 PLETHYSMOGRAPHY LUNG VOLUMES: CPT | Performed by: INTERNAL MEDICINE

## 2018-04-23 PROCEDURE — 6360000002 HC RX W HCPCS: Performed by: INTERNAL MEDICINE

## 2018-04-23 PROCEDURE — 94729 DIFFUSING CAPACITY: CPT | Performed by: INTERNAL MEDICINE

## 2018-04-23 PROCEDURE — 94726 PLETHYSMOGRAPHY LUNG VOLUMES: CPT

## 2018-04-23 RX ORDER — ALBUTEROL SULFATE 2.5 MG/3ML
2.5 SOLUTION RESPIRATORY (INHALATION) ONCE
Status: COMPLETED | OUTPATIENT
Start: 2018-04-23 | End: 2018-04-23

## 2018-04-23 RX ADMIN — ALBUTEROL SULFATE 2.5 MG: 2.5 SOLUTION RESPIRATORY (INHALATION) at 16:01

## 2018-05-07 ENCOUNTER — OFFICE VISIT (OUTPATIENT)
Dept: FAMILY MEDICINE CLINIC | Age: 48
End: 2018-05-07
Payer: COMMERCIAL

## 2018-05-07 VITALS
SYSTOLIC BLOOD PRESSURE: 120 MMHG | RESPIRATION RATE: 16 BRPM | HEART RATE: 76 BPM | DIASTOLIC BLOOD PRESSURE: 84 MMHG | WEIGHT: 166 LBS | TEMPERATURE: 99.9 F | BODY MASS INDEX: 27.66 KG/M2 | HEIGHT: 65 IN

## 2018-05-07 DIAGNOSIS — M94.0 COSTOCHONDRITIS, ACUTE: ICD-10-CM

## 2018-05-07 DIAGNOSIS — Z72.0 TOBACCO ABUSE: ICD-10-CM

## 2018-05-07 DIAGNOSIS — M54.50 CHRONIC BILATERAL LOW BACK PAIN WITHOUT SCIATICA: ICD-10-CM

## 2018-05-07 DIAGNOSIS — R06.09 DOE (DYSPNEA ON EXERTION): ICD-10-CM

## 2018-05-07 DIAGNOSIS — F32.A DEPRESSION, UNSPECIFIED DEPRESSION TYPE: ICD-10-CM

## 2018-05-07 DIAGNOSIS — J45.20 MILD INTERMITTENT ASTHMA WITHOUT COMPLICATION: Primary | ICD-10-CM

## 2018-05-07 DIAGNOSIS — G89.29 CHRONIC BILATERAL LOW BACK PAIN WITHOUT SCIATICA: ICD-10-CM

## 2018-05-07 PROCEDURE — 99214 OFFICE O/P EST MOD 30 MIN: CPT | Performed by: FAMILY MEDICINE

## 2018-05-07 PROCEDURE — 4004F PT TOBACCO SCREEN RCVD TLK: CPT | Performed by: FAMILY MEDICINE

## 2018-05-07 PROCEDURE — G8427 DOCREV CUR MEDS BY ELIG CLIN: HCPCS | Performed by: FAMILY MEDICINE

## 2018-05-07 PROCEDURE — G8419 CALC BMI OUT NRM PARAM NOF/U: HCPCS | Performed by: FAMILY MEDICINE

## 2018-05-07 RX ORDER — ALBUTEROL SULFATE 90 UG/1
2 AEROSOL, METERED RESPIRATORY (INHALATION) EVERY 6 HOURS PRN
Qty: 1 INHALER | Refills: 5 | Status: SHIPPED | OUTPATIENT
Start: 2018-05-07 | End: 2018-09-18 | Stop reason: SDUPTHER

## 2018-05-14 ENCOUNTER — TELEPHONE (OUTPATIENT)
Dept: FAMILY MEDICINE CLINIC | Age: 48
End: 2018-05-14

## 2018-05-15 ENCOUNTER — OFFICE VISIT (OUTPATIENT)
Dept: FAMILY MEDICINE CLINIC | Age: 48
End: 2018-05-15
Payer: COMMERCIAL

## 2018-05-15 VITALS
WEIGHT: 168 LBS | SYSTOLIC BLOOD PRESSURE: 126 MMHG | DIASTOLIC BLOOD PRESSURE: 80 MMHG | HEART RATE: 93 BPM | TEMPERATURE: 96.8 F | BODY MASS INDEX: 27.99 KG/M2 | OXYGEN SATURATION: 98 % | RESPIRATION RATE: 14 BRPM | HEIGHT: 65 IN

## 2018-05-15 DIAGNOSIS — T36.95XA ANTIBIOTIC-INDUCED YEAST INFECTION: ICD-10-CM

## 2018-05-15 DIAGNOSIS — J02.9 SORE THROAT: ICD-10-CM

## 2018-05-15 DIAGNOSIS — B37.9 ANTIBIOTIC-INDUCED YEAST INFECTION: ICD-10-CM

## 2018-05-15 DIAGNOSIS — J45.901 ASTHMA EXACERBATION, MILD: Primary | ICD-10-CM

## 2018-05-15 LAB — S PYO AG THROAT QL: NORMAL

## 2018-05-15 PROCEDURE — G8419 CALC BMI OUT NRM PARAM NOF/U: HCPCS | Performed by: NURSE PRACTITIONER

## 2018-05-15 PROCEDURE — G8427 DOCREV CUR MEDS BY ELIG CLIN: HCPCS | Performed by: NURSE PRACTITIONER

## 2018-05-15 PROCEDURE — 99213 OFFICE O/P EST LOW 20 MIN: CPT | Performed by: NURSE PRACTITIONER

## 2018-05-15 PROCEDURE — 4004F PT TOBACCO SCREEN RCVD TLK: CPT | Performed by: NURSE PRACTITIONER

## 2018-05-15 PROCEDURE — 87880 STREP A ASSAY W/OPTIC: CPT | Performed by: NURSE PRACTITIONER

## 2018-05-15 RX ORDER — AZITHROMYCIN 250 MG/1
TABLET, FILM COATED ORAL
Qty: 1 PACKET | Refills: 0 | Status: SHIPPED | OUTPATIENT
Start: 2018-05-15 | End: 2018-05-25

## 2018-05-15 RX ORDER — FLUCONAZOLE 100 MG/1
100 TABLET ORAL DAILY
Qty: 2 TABLET | Refills: 0 | Status: SHIPPED | OUTPATIENT
Start: 2018-05-15 | End: 2018-06-01 | Stop reason: ALTCHOICE

## 2018-05-15 RX ORDER — PREDNISONE 10 MG/1
TABLET ORAL
Qty: 40 TABLET | Refills: 0 | Status: SHIPPED | OUTPATIENT
Start: 2018-05-15 | End: 2018-06-01 | Stop reason: ALTCHOICE

## 2018-05-15 ASSESSMENT — ENCOUNTER SYMPTOMS
DIARRHEA: 0
COUGH: 1
RHINORRHEA: 1
EYE DISCHARGE: 0
CONSTIPATION: 0
NAUSEA: 0
EYE REDNESS: 0
SHORTNESS OF BREATH: 0
WHEEZING: 0
EYE ITCHING: 0
TROUBLE SWALLOWING: 0
CHEST TIGHTNESS: 0
VOMITING: 0
EYE PAIN: 0
SINUS PRESSURE: 1
SORE THROAT: 1
SINUS PAIN: 1

## 2018-05-16 DIAGNOSIS — J02.9 SORE THROAT: ICD-10-CM

## 2018-05-19 LAB — THROAT CULTURE: NORMAL

## 2018-06-01 ENCOUNTER — TELEPHONE (OUTPATIENT)
Dept: FAMILY MEDICINE CLINIC | Age: 48
End: 2018-06-01

## 2018-06-01 ENCOUNTER — OFFICE VISIT (OUTPATIENT)
Dept: FAMILY MEDICINE CLINIC | Age: 48
End: 2018-06-01
Payer: COMMERCIAL

## 2018-06-01 VITALS
HEIGHT: 65 IN | DIASTOLIC BLOOD PRESSURE: 86 MMHG | BODY MASS INDEX: 27.66 KG/M2 | TEMPERATURE: 98.5 F | HEART RATE: 78 BPM | OXYGEN SATURATION: 97 % | SYSTOLIC BLOOD PRESSURE: 132 MMHG | RESPIRATION RATE: 16 BRPM | WEIGHT: 166 LBS

## 2018-06-01 DIAGNOSIS — Z74.09 IMPAIRED MOBILITY: ICD-10-CM

## 2018-06-01 DIAGNOSIS — T14.90XA INJURY: Primary | ICD-10-CM

## 2018-06-01 PROCEDURE — G8419 CALC BMI OUT NRM PARAM NOF/U: HCPCS | Performed by: NURSE PRACTITIONER

## 2018-06-01 PROCEDURE — G8427 DOCREV CUR MEDS BY ELIG CLIN: HCPCS | Performed by: NURSE PRACTITIONER

## 2018-06-01 PROCEDURE — 99213 OFFICE O/P EST LOW 20 MIN: CPT | Performed by: NURSE PRACTITIONER

## 2018-06-01 PROCEDURE — 4004F PT TOBACCO SCREEN RCVD TLK: CPT | Performed by: NURSE PRACTITIONER

## 2018-06-01 ASSESSMENT — ENCOUNTER SYMPTOMS: BACK PAIN: 0

## 2018-06-06 ENCOUNTER — TELEPHONE (OUTPATIENT)
Dept: FAMILY MEDICINE CLINIC | Age: 48
End: 2018-06-06

## 2018-08-13 ENCOUNTER — OFFICE VISIT (OUTPATIENT)
Dept: FAMILY MEDICINE CLINIC | Age: 48
End: 2018-08-13
Payer: COMMERCIAL

## 2018-08-13 VITALS
HEIGHT: 65 IN | HEART RATE: 72 BPM | WEIGHT: 169 LBS | DIASTOLIC BLOOD PRESSURE: 86 MMHG | TEMPERATURE: 98.7 F | BODY MASS INDEX: 28.16 KG/M2 | RESPIRATION RATE: 16 BRPM | SYSTOLIC BLOOD PRESSURE: 136 MMHG

## 2018-08-13 DIAGNOSIS — M54.50 CHRONIC BILATERAL LOW BACK PAIN WITHOUT SCIATICA: Primary | ICD-10-CM

## 2018-08-13 DIAGNOSIS — F32.A DEPRESSION, UNSPECIFIED DEPRESSION TYPE: ICD-10-CM

## 2018-08-13 DIAGNOSIS — G89.29 CHRONIC BILATERAL LOW BACK PAIN WITHOUT SCIATICA: Primary | ICD-10-CM

## 2018-08-13 DIAGNOSIS — Z72.0 TOBACCO ABUSE: ICD-10-CM

## 2018-08-13 DIAGNOSIS — J45.51 SEVERE PERSISTENT ASTHMA WITH ACUTE EXACERBATION: ICD-10-CM

## 2018-08-13 DIAGNOSIS — J45.901 ASTHMA EXACERBATION, MILD: ICD-10-CM

## 2018-08-13 PROCEDURE — 99214 OFFICE O/P EST MOD 30 MIN: CPT | Performed by: FAMILY MEDICINE

## 2018-08-13 PROCEDURE — G8419 CALC BMI OUT NRM PARAM NOF/U: HCPCS | Performed by: FAMILY MEDICINE

## 2018-08-13 PROCEDURE — G8427 DOCREV CUR MEDS BY ELIG CLIN: HCPCS | Performed by: FAMILY MEDICINE

## 2018-08-13 PROCEDURE — 4004F PT TOBACCO SCREEN RCVD TLK: CPT | Performed by: FAMILY MEDICINE

## 2018-08-13 RX ORDER — PREDNISONE 10 MG/1
TABLET ORAL
Qty: 40 TABLET | Refills: 0 | Status: SHIPPED | OUTPATIENT
Start: 2018-08-13 | End: 2018-09-18 | Stop reason: ALTCHOICE

## 2018-08-13 NOTE — LETTER
[address] [place] [address] [facility] [address]  Phone: [phone]  Fax: [phone]    Tuyet Eller MD        August 13, 2018     Patient: Loyda Martinez   YOB: 1970   Date of Visit: 8/13/2018       To Whom It May Concern: It is my medical opinion that Loyda Martinez has pulmonary issues that require avoiding exposure to mold and other lung irritants. If you have any questions or concerns, please don't hesitate to call.     Sincerely,        Tuyet Eller MD

## 2018-08-20 ENCOUNTER — OFFICE VISIT (OUTPATIENT)
Dept: FAMILY MEDICINE CLINIC | Age: 48
End: 2018-08-20
Payer: COMMERCIAL

## 2018-08-20 VITALS
WEIGHT: 165.6 LBS | DIASTOLIC BLOOD PRESSURE: 80 MMHG | SYSTOLIC BLOOD PRESSURE: 120 MMHG | RESPIRATION RATE: 20 BRPM | OXYGEN SATURATION: 99 % | TEMPERATURE: 98.6 F | BODY MASS INDEX: 27.59 KG/M2 | HEIGHT: 65 IN | HEART RATE: 112 BPM

## 2018-08-20 DIAGNOSIS — J45.51 SEVERE PERSISTENT ASTHMA WITH ACUTE EXACERBATION: Primary | ICD-10-CM

## 2018-08-20 PROCEDURE — G8427 DOCREV CUR MEDS BY ELIG CLIN: HCPCS | Performed by: NURSE PRACTITIONER

## 2018-08-20 PROCEDURE — G8419 CALC BMI OUT NRM PARAM NOF/U: HCPCS | Performed by: NURSE PRACTITIONER

## 2018-08-20 PROCEDURE — 4004F PT TOBACCO SCREEN RCVD TLK: CPT | Performed by: NURSE PRACTITIONER

## 2018-08-20 PROCEDURE — 99212 OFFICE O/P EST SF 10 MIN: CPT | Performed by: NURSE PRACTITIONER

## 2018-08-20 ASSESSMENT — ENCOUNTER SYMPTOMS
SINUS PRESSURE: 0
SORE THROAT: 0
TROUBLE SWALLOWING: 0
SHORTNESS OF BREATH: 1
EYE REDNESS: 0
SINUS PAIN: 0
RHINORRHEA: 0
COUGH: 1
PHOTOPHOBIA: 0
STRIDOR: 0
EYE ITCHING: 0
CHEST TIGHTNESS: 0
EYE DISCHARGE: 0
DIFFICULTY BREATHING: 1
HEARTBURN: 0
CHEST TIGHTNESS: 1
FACIAL SWELLING: 0
WHEEZING: 1
EYE PAIN: 0

## 2018-08-20 NOTE — PROGRESS NOTES
Diagnosis Orders   1. Severe persistent asthma with acute exacerbation         No orders of the defined types were placed in this encounter. Coreen Friedman has been alternating albuterol and ipratropium nebulizer treatments every 2 hours with no relief. She has been taking oral steroids since last week when she first started to feel unwell. She reports she has been hospitalized twice in 2018 for asthma exacerbations. Discussed treatment options with Coreen Friedman. Due to the severity of shortness of breath, and her history of severe asthma, she was agreeable for ER evaluation with possible STAT imaging and lab work which is not available through the [address] [place] [address], as well as additional IV and inhaled medications. She repeatedly refused ambulance transfer, stating she would have one of her children take her to the [facility] [address] or [address] [place] [address] ER now. She was stable upon leaving the [address] [place] [address]. I have reviewed and updated the electronic medical record.     RODRIGO Stock NP

## 2018-09-18 ENCOUNTER — OFFICE VISIT (OUTPATIENT)
Dept: PULMONOLOGY | Age: 48
End: 2018-09-18
Payer: COMMERCIAL

## 2018-09-18 VITALS
DIASTOLIC BLOOD PRESSURE: 74 MMHG | BODY MASS INDEX: 29.02 KG/M2 | HEIGHT: 65 IN | HEART RATE: 101 BPM | OXYGEN SATURATION: 97 % | TEMPERATURE: 97.9 F | SYSTOLIC BLOOD PRESSURE: 138 MMHG | WEIGHT: 174.2 LBS

## 2018-09-18 DIAGNOSIS — J43.2 CENTRILOBULAR EMPHYSEMA (HCC): ICD-10-CM

## 2018-09-18 DIAGNOSIS — J45.40 MODERATE PERSISTENT ASTHMA WITHOUT COMPLICATION: ICD-10-CM

## 2018-09-18 DIAGNOSIS — F17.200 SMOKING: ICD-10-CM

## 2018-09-18 DIAGNOSIS — J45.40 MODERATE PERSISTENT ASTHMA WITHOUT COMPLICATION: Primary | ICD-10-CM

## 2018-09-18 DIAGNOSIS — R06.83 SNORING: ICD-10-CM

## 2018-09-18 LAB
BASOPHILS ABSOLUTE: 0.1 K/UL (ref 0–0.2)
BASOPHILS RELATIVE PERCENT: 0.7 %
EOSINOPHILS ABSOLUTE: 0.2 K/UL (ref 0–0.7)
EOSINOPHILS RELATIVE PERCENT: 2.3 %
HCT VFR BLD CALC: 46.4 % (ref 37–47)
HEMOGLOBIN: 15.7 G/DL (ref 12–16)
LYMPHOCYTES ABSOLUTE: 3 K/UL (ref 1–4.8)
LYMPHOCYTES RELATIVE PERCENT: 27.7 %
MCH RBC QN AUTO: 32.3 PG (ref 27–31.3)
MCHC RBC AUTO-ENTMCNC: 33.7 % (ref 33–37)
MCV RBC AUTO: 95.8 FL (ref 82–100)
MONOCYTES ABSOLUTE: 0.7 K/UL (ref 0.2–0.8)
MONOCYTES RELATIVE PERCENT: 6.4 %
NEUTROPHILS ABSOLUTE: 6.9 K/UL (ref 1.4–6.5)
NEUTROPHILS RELATIVE PERCENT: 62.9 %
PDW BLD-RTO: 14.9 % (ref 11.5–14.5)
PLATELET # BLD: 413 K/UL (ref 130–400)
RBC # BLD: 4.85 M/UL (ref 4.2–5.4)
WBC # BLD: 10.9 K/UL (ref 4.8–10.8)

## 2018-09-18 PROCEDURE — G8427 DOCREV CUR MEDS BY ELIG CLIN: HCPCS | Performed by: INTERNAL MEDICINE

## 2018-09-18 PROCEDURE — 3023F SPIROM DOC REV: CPT | Performed by: INTERNAL MEDICINE

## 2018-09-18 PROCEDURE — 99214 OFFICE O/P EST MOD 30 MIN: CPT | Performed by: INTERNAL MEDICINE

## 2018-09-18 PROCEDURE — G8926 SPIRO NO PERF OR DOC: HCPCS | Performed by: INTERNAL MEDICINE

## 2018-09-18 PROCEDURE — G8419 CALC BMI OUT NRM PARAM NOF/U: HCPCS | Performed by: INTERNAL MEDICINE

## 2018-09-18 PROCEDURE — 4004F PT TOBACCO SCREEN RCVD TLK: CPT | Performed by: INTERNAL MEDICINE

## 2018-09-18 RX ORDER — PREDNISONE 10 MG/1
TABLET ORAL
Qty: 40 TABLET | Refills: 0 | Status: SHIPPED | OUTPATIENT
Start: 2018-09-18 | End: 2019-07-24 | Stop reason: ALTCHOICE

## 2018-09-18 RX ORDER — ALBUTEROL SULFATE 90 UG/1
2 AEROSOL, METERED RESPIRATORY (INHALATION) EVERY 6 HOURS PRN
Qty: 1 INHALER | Refills: 5 | Status: SHIPPED | OUTPATIENT
Start: 2018-09-18

## 2018-09-18 RX ORDER — FLUTICASONE FUROATE AND VILANTEROL 200; 25 UG/1; UG/1
1 POWDER RESPIRATORY (INHALATION) DAILY
Qty: 1 EACH | Refills: 2 | Status: SHIPPED | OUTPATIENT
Start: 2018-09-18 | End: 2019-07-24 | Stop reason: SDUPTHER

## 2018-09-18 NOTE — PATIENT INSTRUCTIONS
Patient Education          mepolizumab  Pronunciation:  MEP juan Vargas  What is the most important information I should know about mepolizumab? Follow all directions on your medicine label and package. Tell each of your healthcare providers about all your medical conditions, allergies, and all medicines you use. What is mepolizumab? Mepolizumab is a monoclonal antibody that affects the actions of the body's immune system. Mepolizumab works by reducing levels of a certain type of white blood cell that may contribute to certain disorders. Mepolizumab is used together with other medicines to help control severe asthma in adults and children who are at least 15years old. Mepolizumab is also used in adults to help control symptoms and reduce flares of a rare autoimmune disorder called eosinophilic granulomatosis with polyangiitis (EPGA). Mepolizumab may also be used for purposes not listed in this medication guide. What should I discuss with my healthcare provider before using mepolizumab? You should not use mepolizumab if you are allergic to it. To make sure mepolizumab is safe for you, tell your doctor if:  · you also use steroid asthma medication;  · you have a history of parasite infection (such as roundworms or tapeworms);  · you have a history of herpes zoster (also called shingles); or  · you have never had chickenpox or received a chickenpox (varicella) vaccine. You may be given a zoster vaccine before you start using mepolizumab. It is not known whether this medicine will harm an unborn baby. Tell your doctor if you are pregnant or plan to become pregnant. If you are pregnant, your name may be listed on a pregnancy registry. This is to track the outcome of the pregnancy and to evaluate any effects of mepolizumab on the baby. It is not known whether mepolizumab passes into breast milk or if it could harm a nursing baby.  Tell your doctor if you are breast-feeding a baby.  Mepolizumab is not approved for use by anyone younger than 15years old. Mepolizumab should not be used to treat EPGA in anyone younger than 25years old. How is mepolizumab given? Before you start treatment with mepolizumab, your doctor may perform tests to measure your levels of a certain type of white blood cell (eosinophils). Follow all directions on your prescription label. Do not use this medicine in larger or smaller amounts or for longer than recommended. Mepolizumab is injected under the skin once every 4 weeks. You may be shown how to use injections at home. Do not give yourself this medicine if you do not understand how to use the injection and properly dispose of used needles and syringes. Mepolizumab is not a rescue medicine. It will not work fast enough to treat an asthma attack. Use only a fast acting inhalation medicine for an asthma attack. Tell your doctor if it seems like your asthma medications don't work as well. If you also use a steroid medication, do not stop using it suddenly or you may have unpleasant withdrawal symptoms. Talk with your doctor about tapering your steroid dose before stopping completely. Mepolizumab is a powder medicine that must be mixed with a liquid (diluent) before using it. If you are using the injections at home, be sure you understand how to properly mix and store the medicine. Gently swirl but do not shake the mixed medicine. Prepare your dose only when you are ready to give an injection. Do not use if the medicine has changed colors or has particles in it. Call your pharmacist for new medicine. Each single-use vial (bottle) of this medicine is for one use only. Throw away after one use, even if there is still some medicine left in it after injecting your dose. Use a disposable needle and syringe only once. Follow any state or local laws about throwing away used needles and syringes.  Use a puncture-proof \"sharps\" disposal container (ask your prescribed. Every effort has been made to ensure that the information provided by [address] is accurate, up-to-date, and complete, but no guarantee is made to that effect. Drug information contained herein may be time sensitive. [facility] information has been compiled for use by healthcare practitioners and consumers in the [place] and therefore [facility] does not warrant that uses outside of the [place] are appropriate, unless specifically indicated otherwise. [facility]'s drug information does not endorse drugs, diagnose patients or recommend therapy. [facility]'s drug information is an informational resource designed to assist licensed healthcare practitioners in caring for their patients and/or to serve consumers viewing this service as a supplement to, and not a substitute for, the expertise, skill, knowledge and judgment of healthcare practitioners. The absence of a warning for a given drug or drug combination in no way should be construed to indicate that the drug or drug combination is safe, effective or appropriate for any given patient. [facility] does not assume any responsibility for any aspect of healthcare administered with the aid of information [facility] provides. The information contained herein is not intended to cover all possible uses, directions, precautions, warnings, drug interactions, allergic reactions, or adverse effects. If you have questions about the drugs you are taking, check with your doctor, nurse or pharmacist.  Copyright 0819-[address]40 [address]. Version: 3.01. Revision date: 12/27/2017. Care instructions adapted under license by [facility] ([facility]). If you have questions about a medical condition or this instruction, always ask your healthcare professional. [place] [address] any warranty or liability for your use of this information.

## 2018-09-19 DIAGNOSIS — J45.40 MODERATE PERSISTENT ASTHMA WITHOUT COMPLICATION: ICD-10-CM

## 2018-10-23 DIAGNOSIS — J45.40 MODERATE PERSISTENT ASTHMA WITHOUT COMPLICATION: ICD-10-CM

## 2018-10-30 ENCOUNTER — OFFICE VISIT (OUTPATIENT)
Dept: PULMONOLOGY | Age: 48
End: 2018-10-30
Payer: COMMERCIAL

## 2018-10-30 VITALS
HEIGHT: 65 IN | HEART RATE: 88 BPM | TEMPERATURE: 96.9 F | WEIGHT: 180.6 LBS | SYSTOLIC BLOOD PRESSURE: 142 MMHG | OXYGEN SATURATION: 98 % | BODY MASS INDEX: 30.09 KG/M2 | DIASTOLIC BLOOD PRESSURE: 94 MMHG

## 2018-10-30 DIAGNOSIS — F17.200 SMOKING: ICD-10-CM

## 2018-10-30 DIAGNOSIS — R09.81 NASAL CONGESTION: ICD-10-CM

## 2018-10-30 DIAGNOSIS — J44.9 ASTHMA-COPD OVERLAP SYNDROME (HCC): ICD-10-CM

## 2018-10-30 DIAGNOSIS — J45.40 MODERATE PERSISTENT ASTHMA WITHOUT COMPLICATION: Primary | ICD-10-CM

## 2018-10-30 DIAGNOSIS — J43.2 CENTRILOBULAR EMPHYSEMA (HCC): ICD-10-CM

## 2018-10-30 PROCEDURE — G8417 CALC BMI ABV UP PARAM F/U: HCPCS | Performed by: INTERNAL MEDICINE

## 2018-10-30 PROCEDURE — G8484 FLU IMMUNIZE NO ADMIN: HCPCS | Performed by: INTERNAL MEDICINE

## 2018-10-30 PROCEDURE — 3023F SPIROM DOC REV: CPT | Performed by: INTERNAL MEDICINE

## 2018-10-30 PROCEDURE — G8427 DOCREV CUR MEDS BY ELIG CLIN: HCPCS | Performed by: INTERNAL MEDICINE

## 2018-10-30 PROCEDURE — G8926 SPIRO NO PERF OR DOC: HCPCS | Performed by: INTERNAL MEDICINE

## 2018-10-30 PROCEDURE — 4004F PT TOBACCO SCREEN RCVD TLK: CPT | Performed by: INTERNAL MEDICINE

## 2018-10-30 PROCEDURE — 99214 OFFICE O/P EST MOD 30 MIN: CPT | Performed by: INTERNAL MEDICINE

## 2018-10-31 DIAGNOSIS — J45.40 MODERATE PERSISTENT ASTHMA WITHOUT COMPLICATION: ICD-10-CM

## 2018-11-05 ENCOUNTER — HOSPITAL ENCOUNTER (OUTPATIENT)
Dept: INFUSION THERAPY | Age: 48
Setting detail: INFUSION SERIES
Discharge: HOME | End: 2018-11-05
Payer: COMMERCIAL

## 2018-11-05 VITALS
TEMPERATURE: 98.4 F | SYSTOLIC BLOOD PRESSURE: 173 MMHG | RESPIRATION RATE: 18 BRPM | DIASTOLIC BLOOD PRESSURE: 95 MMHG | HEART RATE: 95 BPM

## 2018-11-05 DIAGNOSIS — J45.40 MODERATE PERSISTENT ASTHMA, UNSPECIFIED WHETHER COMPLICATED: ICD-10-CM

## 2018-11-05 DIAGNOSIS — J45.40 MODERATE PERSISTENT ASTHMA WITHOUT COMPLICATION: ICD-10-CM

## 2018-11-05 PROCEDURE — 96401 CHEMO ANTI-NEOPL SQ/IM: CPT

## 2018-11-05 PROCEDURE — 6360000002 HC RX W HCPCS: Performed by: INTERNAL MEDICINE

## 2018-11-05 RX ADMIN — MEPOLIZUMAB 100 MG: 100 INJECTION, POWDER, FOR SOLUTION SUBCUTANEOUS at 15:35

## 2018-11-05 NOTE — PROGRESS NOTES
1630. Pt leaving at this time after observation. No noted distress, no c/o's. All equipment used in the care for this patient has been cleaned.

## 2018-11-05 NOTE — PROGRESS NOTES
1535. Pt given Nucala 100mg subcu right upper lateral arm. Shanda well. Nucala respiratory biologic advocate speaking with pt at present regarding initiation of Nucala and education including side effects.

## 2018-12-11 ENCOUNTER — OFFICE VISIT (OUTPATIENT)
Dept: PULMONOLOGY | Age: 48
End: 2018-12-11
Payer: COMMERCIAL

## 2018-12-11 VITALS
BODY MASS INDEX: 31.07 KG/M2 | HEIGHT: 65 IN | WEIGHT: 186.5 LBS | TEMPERATURE: 98.2 F | SYSTOLIC BLOOD PRESSURE: 160 MMHG | DIASTOLIC BLOOD PRESSURE: 94 MMHG | HEART RATE: 65 BPM | OXYGEN SATURATION: 94 %

## 2018-12-11 DIAGNOSIS — J44.9 ASTHMA-COPD OVERLAP SYNDROME (HCC): Primary | ICD-10-CM

## 2018-12-11 DIAGNOSIS — R06.09 DOE (DYSPNEA ON EXERTION): ICD-10-CM

## 2018-12-11 DIAGNOSIS — R05.9 COUGH: ICD-10-CM

## 2018-12-11 DIAGNOSIS — F17.200 SMOKING: ICD-10-CM

## 2018-12-11 PROCEDURE — 99213 OFFICE O/P EST LOW 20 MIN: CPT | Performed by: INTERNAL MEDICINE

## 2018-12-11 PROCEDURE — 4004F PT TOBACCO SCREEN RCVD TLK: CPT | Performed by: INTERNAL MEDICINE

## 2018-12-11 PROCEDURE — G8417 CALC BMI ABV UP PARAM F/U: HCPCS | Performed by: INTERNAL MEDICINE

## 2018-12-11 PROCEDURE — G8427 DOCREV CUR MEDS BY ELIG CLIN: HCPCS | Performed by: INTERNAL MEDICINE

## 2018-12-11 PROCEDURE — G8926 SPIRO NO PERF OR DOC: HCPCS | Performed by: INTERNAL MEDICINE

## 2018-12-11 PROCEDURE — G8484 FLU IMMUNIZE NO ADMIN: HCPCS | Performed by: INTERNAL MEDICINE

## 2018-12-11 PROCEDURE — 3023F SPIROM DOC REV: CPT | Performed by: INTERNAL MEDICINE

## 2018-12-11 NOTE — PROGRESS NOTES
ml every 28 days DX J45.45 Severe Asthma 1 mL 5    Fluticasone Furoate-Vilanterol (BREO ELLIPTA) 200-25 MCG/INH AEPB Inhale 1 puff into the lungs daily 1 each 2    beclomethasone (QVAR) 80 MCG/ACT inhaler Inhale 1 puff into the lungs 2 times daily 1 Inhaler 3    albuterol sulfate  (90 Base) MCG/ACT inhaler Inhale 2 puffs into the lungs every 6 hours as needed for Wheezing 1 Inhaler 5    predniSONE (DELTASONE) 10 MG tablet Take 4 tablet daily X 4 days then 3 tablets daily x 4 days then 2 tablets daily x 4 days then 1 tablet daily x 4 days then stop. 40 tablet 0    Umeclidinium Bromide (INCRUSE ELLIPTA) 62.5 MCG/INH AEPB Inhale 1 puff into the lungs daily 2 each 5    fluticasone (FLONASE) 50 MCG/ACT nasal spray 1 spray by Nasal route daily 1 Bottle 3    ipratropium-albuterol (DUONEB) 0.5-2.5 (3) MG/3ML SOLN nebulizer solution Inhale 3 mLs into the lungs every 6 hours 360 mL 5    albuterol (PROVENTIL) (2.5 MG/3ML) 0.083% nebulizer solution Take 3 mLs by nebulization every 6 hours as needed for Wheezing 120 each 3     No current facility-administered medications for this visit. Objective:     Vitals:    12/11/18 1604 12/11/18 1612   BP: (!) 156/98 (!) 160/94   Site: Right Upper Arm Left Upper Arm   Pulse: 65    Temp: 98.2 °F (36.8 °C)    TempSrc: Tympanic    SpO2: 94%    Weight: 186 lb 8 oz (84.6 kg)    Height: 5' 5\" (1.651 m)          Physical Exam   Constitutional: She is oriented to person, place, and time. She appears well-developed and well-nourished. No distress. HENT:   Head: Normocephalic and atraumatic. Eyes: Pupils are equal, round, and reactive to light. Conjunctivae are normal.   Neck: Normal range of motion. Neck supple. Cardiovascular: Normal rate and regular rhythm. Exam reveals no gallop and no friction rub. No murmur heard. Pulmonary/Chest: Effort normal and breath sounds normal. No respiratory distress. She has no wheezes. She has no rales. She exhibits no tenderness.

## 2018-12-12 DIAGNOSIS — J45.40 MODERATE PERSISTENT ASTHMA WITHOUT COMPLICATION: ICD-10-CM

## 2019-01-04 ENCOUNTER — TELEPHONE (OUTPATIENT)
Dept: PULMONOLOGY | Age: 49
End: 2019-01-04

## 2019-01-16 ENCOUNTER — OFFICE VISIT (OUTPATIENT)
Dept: FAMILY MEDICINE CLINIC | Age: 49
End: 2019-01-16
Payer: COMMERCIAL

## 2019-01-16 VITALS
SYSTOLIC BLOOD PRESSURE: 144 MMHG | HEART RATE: 98 BPM | OXYGEN SATURATION: 98 % | HEIGHT: 65 IN | TEMPERATURE: 96.8 F | BODY MASS INDEX: 30.66 KG/M2 | WEIGHT: 184 LBS | DIASTOLIC BLOOD PRESSURE: 96 MMHG | RESPIRATION RATE: 16 BRPM

## 2019-01-16 DIAGNOSIS — J11.1 INFLUENZA: Primary | ICD-10-CM

## 2019-01-16 DIAGNOSIS — J01.40 ACUTE PANSINUSITIS, RECURRENCE NOT SPECIFIED: ICD-10-CM

## 2019-01-16 LAB
INFLUENZA A ANTIBODY: NORMAL
INFLUENZA B ANTIBODY: NORMAL

## 2019-01-16 PROCEDURE — 87804 INFLUENZA ASSAY W/OPTIC: CPT | Performed by: INTERNAL MEDICINE

## 2019-01-16 PROCEDURE — 99213 OFFICE O/P EST LOW 20 MIN: CPT | Performed by: INTERNAL MEDICINE

## 2019-01-16 RX ORDER — OSELTAMIVIR PHOSPHATE 75 MG/1
75 CAPSULE ORAL 2 TIMES DAILY
Qty: 10 CAPSULE | Refills: 0 | Status: SHIPPED | OUTPATIENT
Start: 2019-01-16 | End: 2019-01-21

## 2019-01-16 RX ORDER — AZITHROMYCIN 250 MG/1
TABLET, FILM COATED ORAL
Qty: 1 PACKET | Refills: 0 | Status: SHIPPED | OUTPATIENT
Start: 2019-01-16 | End: 2019-03-19 | Stop reason: ALTCHOICE

## 2019-01-17 ASSESSMENT — ENCOUNTER SYMPTOMS
DIARRHEA: 0
SINUS PAIN: 0
SHORTNESS OF BREATH: 1
COUGH: 1
SINUS PRESSURE: 1
VOMITING: 0
CHEST TIGHTNESS: 1
RHINORRHEA: 0
ABDOMINAL PAIN: 0
WHEEZING: 1
SORE THROAT: 1
NAUSEA: 0

## 2019-03-19 ENCOUNTER — TELEPHONE (OUTPATIENT)
Dept: FAMILY MEDICINE CLINIC | Age: 49
End: 2019-03-19

## 2019-03-19 ENCOUNTER — OFFICE VISIT (OUTPATIENT)
Dept: PULMONOLOGY | Age: 49
End: 2019-03-19
Payer: COMMERCIAL

## 2019-03-19 VITALS
DIASTOLIC BLOOD PRESSURE: 82 MMHG | BODY MASS INDEX: 31.25 KG/M2 | HEART RATE: 80 BPM | OXYGEN SATURATION: 99 % | TEMPERATURE: 97 F | HEIGHT: 65 IN | SYSTOLIC BLOOD PRESSURE: 138 MMHG | WEIGHT: 187.6 LBS

## 2019-03-19 DIAGNOSIS — J44.9 ASTHMA-COPD OVERLAP SYNDROME (HCC): ICD-10-CM

## 2019-03-19 DIAGNOSIS — E66.09 CLASS 1 OBESITY DUE TO EXCESS CALORIES WITHOUT SERIOUS COMORBIDITY WITH BODY MASS INDEX (BMI) OF 31.0 TO 31.9 IN ADULT: ICD-10-CM

## 2019-03-19 DIAGNOSIS — F17.200 SMOKING: ICD-10-CM

## 2019-03-19 DIAGNOSIS — J45.40 MODERATE PERSISTENT ASTHMA WITHOUT COMPLICATION: Primary | ICD-10-CM

## 2019-03-19 DIAGNOSIS — J30.9 ALLERGIC RHINITIS, UNSPECIFIED SEASONALITY, UNSPECIFIED TRIGGER: ICD-10-CM

## 2019-03-19 PROCEDURE — G8484 FLU IMMUNIZE NO ADMIN: HCPCS | Performed by: INTERNAL MEDICINE

## 2019-03-19 PROCEDURE — G8417 CALC BMI ABV UP PARAM F/U: HCPCS | Performed by: INTERNAL MEDICINE

## 2019-03-19 PROCEDURE — 3023F SPIROM DOC REV: CPT | Performed by: INTERNAL MEDICINE

## 2019-03-19 PROCEDURE — 99214 OFFICE O/P EST MOD 30 MIN: CPT | Performed by: INTERNAL MEDICINE

## 2019-03-19 PROCEDURE — G8427 DOCREV CUR MEDS BY ELIG CLIN: HCPCS | Performed by: INTERNAL MEDICINE

## 2019-03-19 PROCEDURE — 4004F PT TOBACCO SCREEN RCVD TLK: CPT | Performed by: INTERNAL MEDICINE

## 2019-03-19 PROCEDURE — G8926 SPIRO NO PERF OR DOC: HCPCS | Performed by: INTERNAL MEDICINE

## 2019-04-03 ENCOUNTER — HOSPITAL ENCOUNTER (OUTPATIENT)
Dept: INFUSION THERAPY | Age: 49
Setting detail: INFUSION SERIES
Discharge: HOME | End: 2019-04-03
Payer: COMMERCIAL

## 2019-04-03 VITALS
SYSTOLIC BLOOD PRESSURE: 134 MMHG | DIASTOLIC BLOOD PRESSURE: 80 MMHG | RESPIRATION RATE: 18 BRPM | TEMPERATURE: 98.6 F | HEART RATE: 84 BPM

## 2019-04-03 DIAGNOSIS — J45.40 MODERATE PERSISTENT ASTHMA, UNSPECIFIED WHETHER COMPLICATED: Primary | ICD-10-CM

## 2019-04-03 PROCEDURE — 96401 CHEMO ANTI-NEOPL SQ/IM: CPT

## 2019-04-03 PROCEDURE — 6360000002 HC RX W HCPCS: Performed by: INTERNAL MEDICINE

## 2019-04-03 RX ADMIN — MEPOLIZUMAB 100 MG: 100 INJECTION, POWDER, FOR SOLUTION SUBCUTANEOUS at 16:04

## 2019-04-03 NOTE — PROGRESS NOTES
PT TO [facility] FOR INJECTION, NUCALA. PT TOLERATED INJECTION. PT  STATED NO C/O OR REQUESTS.

## 2019-05-01 ENCOUNTER — HOSPITAL ENCOUNTER (OUTPATIENT)
Dept: INFUSION THERAPY | Age: 49
Setting detail: INFUSION SERIES
Discharge: HOME | End: 2019-05-01
Payer: COMMERCIAL

## 2019-05-01 VITALS
DIASTOLIC BLOOD PRESSURE: 95 MMHG | RESPIRATION RATE: 18 BRPM | TEMPERATURE: 98.6 F | SYSTOLIC BLOOD PRESSURE: 177 MMHG | HEART RATE: 97 BPM

## 2019-05-01 DIAGNOSIS — J45.40 MODERATE PERSISTENT ASTHMA, UNSPECIFIED WHETHER COMPLICATED: Primary | ICD-10-CM

## 2019-05-01 PROCEDURE — 96401 CHEMO ANTI-NEOPL SQ/IM: CPT

## 2019-05-01 PROCEDURE — 6360000002 HC RX W HCPCS: Performed by: INTERNAL MEDICINE

## 2019-05-01 RX ADMIN — MEPOLIZUMAB 100 MG: 100 INJECTION, POWDER, FOR SOLUTION SUBCUTANEOUS at 16:04

## 2019-05-29 ENCOUNTER — TELEPHONE (OUTPATIENT)
Dept: PULMONOLOGY | Age: 49
End: 2019-05-29

## 2019-06-03 ENCOUNTER — HOSPITAL ENCOUNTER (OUTPATIENT)
Dept: INFUSION THERAPY | Age: 49
Setting detail: INFUSION SERIES
Discharge: HOME | End: 2019-06-03
Payer: COMMERCIAL

## 2019-06-03 VITALS
SYSTOLIC BLOOD PRESSURE: 186 MMHG | DIASTOLIC BLOOD PRESSURE: 96 MMHG | RESPIRATION RATE: 18 BRPM | HEART RATE: 83 BPM | TEMPERATURE: 98 F

## 2019-06-03 DIAGNOSIS — J45.40 MODERATE PERSISTENT ASTHMA, UNSPECIFIED WHETHER COMPLICATED: Primary | ICD-10-CM

## 2019-06-03 PROCEDURE — 96401 CHEMO ANTI-NEOPL SQ/IM: CPT

## 2019-06-03 PROCEDURE — 6360000002 HC RX W HCPCS: Performed by: INTERNAL MEDICINE

## 2019-06-03 RX ADMIN — MEPOLIZUMAB 100 MG: 100 INJECTION, POWDER, FOR SOLUTION SUBCUTANEOUS at 11:07

## 2019-06-03 NOTE — FLOWSHEET NOTE
Patient to the floor ambulatory for her nucala injection. Vital signs taken. Blood pressure is elevated. Complains of some shortness of breath with exertion. Call light within reach. No distress is noted.

## 2019-06-03 NOTE — FLOWSHEET NOTE
Injection given in right  arm. Tolerated well. Ambulated off of the unit. All equipment used in the care for this patient has been cleaned.

## 2019-06-28 ENCOUNTER — HOSPITAL ENCOUNTER (OUTPATIENT)
Dept: INFUSION THERAPY | Age: 49
Setting detail: INFUSION SERIES
Discharge: HOME | End: 2019-06-28
Payer: COMMERCIAL

## 2019-06-28 VITALS
SYSTOLIC BLOOD PRESSURE: 142 MMHG | HEART RATE: 85 BPM | DIASTOLIC BLOOD PRESSURE: 86 MMHG | TEMPERATURE: 98.8 F | RESPIRATION RATE: 18 BRPM

## 2019-06-28 DIAGNOSIS — J45.40 MODERATE PERSISTENT ASTHMA, UNSPECIFIED WHETHER COMPLICATED: Primary | ICD-10-CM

## 2019-06-28 PROCEDURE — 96401 CHEMO ANTI-NEOPL SQ/IM: CPT

## 2019-06-28 PROCEDURE — 6360000002 HC RX W HCPCS: Performed by: INTERNAL MEDICINE

## 2019-06-28 PROCEDURE — 2580000003 HC RX 258

## 2019-06-28 RX ADMIN — WATER 10 ML: 1 INJECTION INTRAMUSCULAR; INTRAVENOUS; SUBCUTANEOUS at 09:58

## 2019-06-28 RX ADMIN — MEPOLIZUMAB 100 MG: 100 INJECTION, POWDER, FOR SOLUTION SUBCUTANEOUS at 09:58

## 2019-06-28 NOTE — PROGRESS NOTES
Pt to [facility] ambulatory, for nucala. Pt states no c/o injection admin. Pt tolerated well. No c/o or requests. All equipment used in the care for this patient has been cleaned.

## 2019-07-24 ENCOUNTER — OFFICE VISIT (OUTPATIENT)
Dept: PULMONOLOGY | Age: 49
End: 2019-07-24
Payer: COMMERCIAL

## 2019-07-24 VITALS
HEART RATE: 72 BPM | OXYGEN SATURATION: 98 % | DIASTOLIC BLOOD PRESSURE: 90 MMHG | WEIGHT: 198.6 LBS | BODY MASS INDEX: 33.05 KG/M2 | SYSTOLIC BLOOD PRESSURE: 140 MMHG

## 2019-07-24 DIAGNOSIS — J44.9 ASTHMA-COPD OVERLAP SYNDROME (HCC): Primary | ICD-10-CM

## 2019-07-24 DIAGNOSIS — J45.20 MILD INTERMITTENT ASTHMA WITHOUT COMPLICATION: ICD-10-CM

## 2019-07-24 DIAGNOSIS — J20.9 ACUTE BRONCHITIS, UNSPECIFIED ORGANISM: ICD-10-CM

## 2019-07-24 DIAGNOSIS — E66.09 CLASS 1 OBESITY DUE TO EXCESS CALORIES WITHOUT SERIOUS COMORBIDITY WITH BODY MASS INDEX (BMI) OF 31.0 TO 31.9 IN ADULT: ICD-10-CM

## 2019-07-24 DIAGNOSIS — F17.200 SMOKING: ICD-10-CM

## 2019-07-24 DIAGNOSIS — J30.9 ALLERGIC RHINITIS, UNSPECIFIED SEASONALITY, UNSPECIFIED TRIGGER: ICD-10-CM

## 2019-07-24 DIAGNOSIS — R06.09 DOE (DYSPNEA ON EXERTION): ICD-10-CM

## 2019-07-24 PROCEDURE — 4004F PT TOBACCO SCREEN RCVD TLK: CPT | Performed by: INTERNAL MEDICINE

## 2019-07-24 PROCEDURE — 99214 OFFICE O/P EST MOD 30 MIN: CPT | Performed by: INTERNAL MEDICINE

## 2019-07-24 PROCEDURE — G8926 SPIRO NO PERF OR DOC: HCPCS | Performed by: INTERNAL MEDICINE

## 2019-07-24 PROCEDURE — 3023F SPIROM DOC REV: CPT | Performed by: INTERNAL MEDICINE

## 2019-07-24 PROCEDURE — G8427 DOCREV CUR MEDS BY ELIG CLIN: HCPCS | Performed by: INTERNAL MEDICINE

## 2019-07-24 PROCEDURE — G8417 CALC BMI ABV UP PARAM F/U: HCPCS | Performed by: INTERNAL MEDICINE

## 2019-07-24 RX ORDER — ACETAMINOPHEN 325 MG/1
500 TABLET ORAL PRN
COMMUNITY
Start: 2014-01-16

## 2019-07-24 RX ORDER — FLUTICASONE FUROATE AND VILANTEROL 200; 25 UG/1; UG/1
1 POWDER RESPIRATORY (INHALATION) DAILY
Qty: 1 EACH | Refills: 2 | Status: SHIPPED | OUTPATIENT
Start: 2019-07-24 | End: 2021-09-18

## 2019-07-24 RX ORDER — ACETAMINOPHEN 500 MG
500 TABLET ORAL PRN
Status: ON HOLD | COMMUNITY
End: 2019-10-09 | Stop reason: ALTCHOICE

## 2019-07-24 RX ORDER — IBUPROFEN 600 MG/1
600 TABLET ORAL EVERY 6 HOURS PRN
COMMUNITY
End: 2021-09-18

## 2019-07-24 RX ORDER — PREDNISONE 10 MG/1
40 TABLET ORAL DAILY
Qty: 20 TABLET | Refills: 0 | Status: SHIPPED | OUTPATIENT
Start: 2019-07-24 | End: 2019-07-29

## 2019-08-06 DIAGNOSIS — J45.40 MODERATE PERSISTENT ASTHMA WITHOUT COMPLICATION: ICD-10-CM

## 2019-08-16 ENCOUNTER — TELEPHONE (OUTPATIENT)
Dept: PULMONOLOGY | Age: 49
End: 2019-08-16

## 2019-08-30 ENCOUNTER — TELEPHONE (OUTPATIENT)
Dept: PULMONOLOGY | Age: 49
End: 2019-08-30

## 2019-10-09 ENCOUNTER — ANESTHESIA (OUTPATIENT)
Dept: OPERATING ROOM | Age: 49
End: 2019-10-09
Payer: COMMERCIAL

## 2019-10-09 ENCOUNTER — ANESTHESIA EVENT (OUTPATIENT)
Dept: OPERATING ROOM | Age: 49
End: 2019-10-09
Payer: COMMERCIAL

## 2019-10-09 ENCOUNTER — HOSPITAL ENCOUNTER (OUTPATIENT)
Age: 49
Setting detail: OUTPATIENT SURGERY
Discharge: HOME | End: 2019-10-09
Attending: ORTHOPAEDIC SURGERY | Admitting: ORTHOPAEDIC SURGERY
Payer: COMMERCIAL

## 2019-10-09 ENCOUNTER — HOSPITAL ENCOUNTER (OUTPATIENT)
Dept: GENERAL RADIOLOGY | Age: 49
Setting detail: OUTPATIENT SURGERY
Discharge: HOME | End: 2019-10-11
Attending: ORTHOPAEDIC SURGERY
Payer: COMMERCIAL

## 2019-10-09 VITALS
WEIGHT: 198 LBS | TEMPERATURE: 98.6 F | RESPIRATION RATE: 18 BRPM | HEART RATE: 81 BPM | DIASTOLIC BLOOD PRESSURE: 79 MMHG | SYSTOLIC BLOOD PRESSURE: 166 MMHG | BODY MASS INDEX: 31.08 KG/M2 | HEIGHT: 67 IN | OXYGEN SATURATION: 96 %

## 2019-10-09 VITALS — DIASTOLIC BLOOD PRESSURE: 83 MMHG | TEMPERATURE: 97.3 F | SYSTOLIC BLOOD PRESSURE: 148 MMHG | OXYGEN SATURATION: 97 %

## 2019-10-09 DIAGNOSIS — R52 PAIN: ICD-10-CM

## 2019-10-09 LAB
BASOPHILS ABSOLUTE: 0.1 K/UL (ref 0–0.2)
BASOPHILS RELATIVE PERCENT: 1.6 %
EOSINOPHILS ABSOLUTE: 0.2 K/UL (ref 0–0.7)
EOSINOPHILS RELATIVE PERCENT: 1.9 %
HCT VFR BLD CALC: 45.7 % (ref 37–47)
HEMOGLOBIN: 15.6 G/DL (ref 12–16)
LYMPHOCYTES ABSOLUTE: 2.2 K/UL (ref 1–4.8)
LYMPHOCYTES RELATIVE PERCENT: 24.8 %
MCH RBC QN AUTO: 32.4 PG (ref 27–31.3)
MCHC RBC AUTO-ENTMCNC: 34 % (ref 33–37)
MCV RBC AUTO: 95.1 FL (ref 82–100)
MONOCYTES ABSOLUTE: 0.5 K/UL (ref 0.2–0.8)
MONOCYTES RELATIVE PERCENT: 6 %
NEUTROPHILS ABSOLUTE: 5.8 K/UL (ref 1.4–6.5)
NEUTROPHILS RELATIVE PERCENT: 65.7 %
PDW BLD-RTO: 13.9 % (ref 11.5–14.5)
PLATELET # BLD: 460 K/UL (ref 130–400)
RBC # BLD: 4.81 M/UL (ref 4.2–5.4)
WBC # BLD: 8.8 K/UL (ref 4.8–10.8)

## 2019-10-09 PROCEDURE — 2580000003 HC RX 258: Performed by: ORTHOPAEDIC SURGERY

## 2019-10-09 PROCEDURE — 85025 COMPLETE CBC W/AUTO DIFF WBC: CPT

## 2019-10-09 PROCEDURE — 3209999900 FLUORO FOR SURGICAL PROCEDURES

## 2019-10-09 PROCEDURE — 7100000010 HC PHASE II RECOVERY - FIRST 15 MIN: Performed by: ORTHOPAEDIC SURGERY

## 2019-10-09 PROCEDURE — 6360000002 HC RX W HCPCS: Performed by: ANESTHESIOLOGY

## 2019-10-09 PROCEDURE — 6360000002 HC RX W HCPCS: Performed by: NURSE ANESTHETIST, CERTIFIED REGISTERED

## 2019-10-09 PROCEDURE — 7100000000 HC PACU RECOVERY - FIRST 15 MIN: Performed by: ORTHOPAEDIC SURGERY

## 2019-10-09 PROCEDURE — C1713 ANCHOR/SCREW BN/BN,TIS/BN: HCPCS | Performed by: ORTHOPAEDIC SURGERY

## 2019-10-09 PROCEDURE — 3700000000 HC ANESTHESIA ATTENDED CARE: Performed by: ORTHOPAEDIC SURGERY

## 2019-10-09 PROCEDURE — 3700000001 HC ADD 15 MINUTES (ANESTHESIA): Performed by: ORTHOPAEDIC SURGERY

## 2019-10-09 PROCEDURE — 3600000014 HC SURGERY LEVEL 4 ADDTL 15MIN: Performed by: ORTHOPAEDIC SURGERY

## 2019-10-09 PROCEDURE — 2720000010 HC SURG SUPPLY STERILE: Performed by: ORTHOPAEDIC SURGERY

## 2019-10-09 PROCEDURE — 3600000004 HC SURGERY LEVEL 4 BASE: Performed by: ORTHOPAEDIC SURGERY

## 2019-10-09 PROCEDURE — 6360000002 HC RX W HCPCS: Performed by: ORTHOPAEDIC SURGERY

## 2019-10-09 PROCEDURE — 7100000011 HC PHASE II RECOVERY - ADDTL 15 MIN: Performed by: ORTHOPAEDIC SURGERY

## 2019-10-09 PROCEDURE — 6360000002 HC RX W HCPCS: Performed by: STUDENT IN AN ORGANIZED HEALTH CARE EDUCATION/TRAINING PROGRAM

## 2019-10-09 PROCEDURE — 7100000001 HC PACU RECOVERY - ADDTL 15 MIN: Performed by: ORTHOPAEDIC SURGERY

## 2019-10-09 PROCEDURE — 2709999900 HC NON-CHARGEABLE SUPPLY: Performed by: ORTHOPAEDIC SURGERY

## 2019-10-09 PROCEDURE — 64445 NJX AA&/STRD SCIATIC NRV IMG: CPT | Performed by: ANESTHESIOLOGY

## 2019-10-09 DEVICE — SCREW BNE L10MM DIA3.5MM CORT S STL ST NONCANNULATED LOK: Type: IMPLANTABLE DEVICE | Site: ANKLE | Status: FUNCTIONAL

## 2019-10-09 DEVICE — SCREW BNE L10MM DIA4MM CANC S STL ST CANN NONLOCKING FULL: Type: IMPLANTABLE DEVICE | Site: ANKLE | Status: FUNCTIONAL

## 2019-10-09 DEVICE — SCREW BNE L14MM DIA3.5MM CORT S STL ST NONCANNULATED LOK: Type: IMPLANTABLE DEVICE | Site: ANKLE | Status: FUNCTIONAL

## 2019-10-09 DEVICE — SCREW BNE L14MM DIA4MM CANC S STL ST CANN NONLOCKING FULL: Type: IMPLANTABLE DEVICE | Site: ANKLE | Status: FUNCTIONAL

## 2019-10-09 DEVICE — SCREW BNE L18MM DIA2.7MM CORT S STL ST FULL THRD FOR SM: Type: IMPLANTABLE DEVICE | Site: ANKLE | Status: FUNCTIONAL

## 2019-10-09 DEVICE — SCREW BNE L20MM DIA3.5MM CORT S STL ST NONCANNULATED LOK: Type: IMPLANTABLE DEVICE | Site: ANKLE | Status: FUNCTIONAL

## 2019-10-09 DEVICE — SCREW BNE L12MM DIA4MM CANC S STL ST CANN NONLOCKING FULL: Type: IMPLANTABLE DEVICE | Site: ANKLE | Status: FUNCTIONAL

## 2019-10-09 DEVICE — SCREW BNE L12MM DIA3.5MM CORT S STL ST NONCANNULATED LOK: Type: IMPLANTABLE DEVICE | Site: ANKLE | Status: FUNCTIONAL

## 2019-10-09 RX ORDER — HYDROCODONE BITARTRATE AND ACETAMINOPHEN 5; 325 MG/1; MG/1
2 TABLET ORAL PRN
Status: DISCONTINUED | OUTPATIENT
Start: 2019-10-09 | End: 2019-10-09 | Stop reason: HOSPADM

## 2019-10-09 RX ORDER — MEPERIDINE HYDROCHLORIDE 25 MG/ML
12.5 INJECTION INTRAMUSCULAR; INTRAVENOUS; SUBCUTANEOUS EVERY 5 MIN PRN
Status: DISCONTINUED | OUTPATIENT
Start: 2019-10-09 | End: 2019-10-09 | Stop reason: HOSPADM

## 2019-10-09 RX ORDER — HYDROCODONE BITARTRATE AND ACETAMINOPHEN 5; 325 MG/1; MG/1
1 TABLET ORAL PRN
Status: DISCONTINUED | OUTPATIENT
Start: 2019-10-09 | End: 2019-10-09 | Stop reason: HOSPADM

## 2019-10-09 RX ORDER — ROPIVACAINE HYDROCHLORIDE 5 MG/ML
INJECTION, SOLUTION EPIDURAL; INFILTRATION; PERINEURAL
Status: DISCONTINUED | OUTPATIENT
Start: 2019-10-09 | End: 2019-10-09 | Stop reason: SDUPTHER

## 2019-10-09 RX ORDER — MAGNESIUM HYDROXIDE 1200 MG/15ML
LIQUID ORAL CONTINUOUS PRN
Status: COMPLETED | OUTPATIENT
Start: 2019-10-09 | End: 2019-10-09

## 2019-10-09 RX ORDER — PROPOFOL 10 MG/ML
INJECTION, EMULSION INTRAVENOUS PRN
Status: DISCONTINUED | OUTPATIENT
Start: 2019-10-09 | End: 2019-10-09 | Stop reason: SDUPTHER

## 2019-10-09 RX ORDER — DIPHENHYDRAMINE HYDROCHLORIDE 50 MG/ML
12.5 INJECTION INTRAMUSCULAR; INTRAVENOUS
Status: DISCONTINUED | OUTPATIENT
Start: 2019-10-09 | End: 2019-10-09 | Stop reason: HOSPADM

## 2019-10-09 RX ORDER — LIDOCAINE HYDROCHLORIDE 20 MG/ML
INJECTION, SOLUTION INTRAVENOUS PRN
Status: DISCONTINUED | OUTPATIENT
Start: 2019-10-09 | End: 2019-10-09 | Stop reason: SDUPTHER

## 2019-10-09 RX ORDER — DEXAMETHASONE SODIUM PHOSPHATE 4 MG/ML
INJECTION, SOLUTION INTRA-ARTICULAR; INTRALESIONAL; INTRAMUSCULAR; INTRAVENOUS; SOFT TISSUE PRN
Status: DISCONTINUED | OUTPATIENT
Start: 2019-10-09 | End: 2019-10-09 | Stop reason: SDUPTHER

## 2019-10-09 RX ORDER — METHOCARBAMOL 750 MG/1
750 TABLET, FILM COATED ORAL 2 TIMES DAILY
COMMUNITY
End: 2021-09-18

## 2019-10-09 RX ORDER — FENTANYL CITRATE 50 UG/ML
50 INJECTION, SOLUTION INTRAMUSCULAR; INTRAVENOUS EVERY 10 MIN PRN
Status: DISCONTINUED | OUTPATIENT
Start: 2019-10-09 | End: 2019-10-09 | Stop reason: HOSPADM

## 2019-10-09 RX ORDER — METOCLOPRAMIDE HYDROCHLORIDE 5 MG/ML
10 INJECTION INTRAMUSCULAR; INTRAVENOUS
Status: DISCONTINUED | OUTPATIENT
Start: 2019-10-09 | End: 2019-10-09 | Stop reason: HOSPADM

## 2019-10-09 RX ORDER — LIDOCAINE HYDROCHLORIDE 10 MG/ML
INJECTION, SOLUTION INFILTRATION; PERINEURAL
Status: DISCONTINUED
Start: 2019-10-09 | End: 2019-10-09 | Stop reason: HOSPADM

## 2019-10-09 RX ORDER — MORPHINE SULFATE 10 MG/ML
INJECTION, SOLUTION INTRAMUSCULAR; INTRAVENOUS PRN
Status: DISCONTINUED | OUTPATIENT
Start: 2019-10-09 | End: 2019-10-09 | Stop reason: SDUPTHER

## 2019-10-09 RX ORDER — ROPIVACAINE HYDROCHLORIDE 5 MG/ML
INJECTION, SOLUTION EPIDURAL; INFILTRATION; PERINEURAL PRN
Status: DISCONTINUED | OUTPATIENT
Start: 2019-10-09 | End: 2019-10-09 | Stop reason: SDUPTHER

## 2019-10-09 RX ORDER — ONDANSETRON 2 MG/ML
INJECTION INTRAMUSCULAR; INTRAVENOUS PRN
Status: DISCONTINUED | OUTPATIENT
Start: 2019-10-09 | End: 2019-10-09 | Stop reason: SDUPTHER

## 2019-10-09 RX ORDER — MIDAZOLAM HYDROCHLORIDE 1 MG/ML
INJECTION INTRAMUSCULAR; INTRAVENOUS PRN
Status: DISCONTINUED | OUTPATIENT
Start: 2019-10-09 | End: 2019-10-09 | Stop reason: SDUPTHER

## 2019-10-09 RX ORDER — SODIUM CHLORIDE, SODIUM LACTATE, POTASSIUM CHLORIDE, CALCIUM CHLORIDE 600; 310; 30; 20 MG/100ML; MG/100ML; MG/100ML; MG/100ML
INJECTION, SOLUTION INTRAVENOUS CONTINUOUS
Status: DISCONTINUED | OUTPATIENT
Start: 2019-10-09 | End: 2019-10-09 | Stop reason: HOSPADM

## 2019-10-09 RX ORDER — ONDANSETRON 2 MG/ML
4 INJECTION INTRAMUSCULAR; INTRAVENOUS
Status: DISCONTINUED | OUTPATIENT
Start: 2019-10-09 | End: 2019-10-09 | Stop reason: HOSPADM

## 2019-10-09 RX ADMIN — MIDAZOLAM HYDROCHLORIDE 2 MG: 1 INJECTION, SOLUTION INTRAMUSCULAR; INTRAVENOUS at 14:07

## 2019-10-09 RX ADMIN — FENTANYL CITRATE 50 MCG: 50 INJECTION, SOLUTION INTRAMUSCULAR; INTRAVENOUS at 16:23

## 2019-10-09 RX ADMIN — SODIUM CHLORIDE, POTASSIUM CHLORIDE, SODIUM LACTATE AND CALCIUM CHLORIDE: 600; 310; 30; 20 INJECTION, SOLUTION INTRAVENOUS at 15:21

## 2019-10-09 RX ADMIN — ONDANSETRON 4 MG: 2 INJECTION INTRAMUSCULAR; INTRAVENOUS at 15:26

## 2019-10-09 RX ADMIN — ROPIVACAINE HYDROCHLORIDE 20 ML: 5 INJECTION, SOLUTION EPIDURAL; INFILTRATION; PERINEURAL at 12:32

## 2019-10-09 RX ADMIN — HYDROMORPHONE HYDROCHLORIDE 0.5 MG: 1 INJECTION, SOLUTION INTRAMUSCULAR; INTRAVENOUS; SUBCUTANEOUS at 16:14

## 2019-10-09 RX ADMIN — FENTANYL CITRATE 50 MCG: 50 INJECTION, SOLUTION INTRAMUSCULAR; INTRAVENOUS at 16:06

## 2019-10-09 RX ADMIN — LIDOCAINE HYDROCHLORIDE 50 MG: 20 INJECTION, SOLUTION INTRAVENOUS at 14:12

## 2019-10-09 RX ADMIN — MIDAZOLAM HYDROCHLORIDE 2 MG: 1 INJECTION, SOLUTION INTRAMUSCULAR; INTRAVENOUS at 12:29

## 2019-10-09 RX ADMIN — FENTANYL CITRATE 50 MCG: 50 INJECTION, SOLUTION INTRAMUSCULAR; INTRAVENOUS at 14:59

## 2019-10-09 RX ADMIN — VANCOMYCIN HYDROCHLORIDE 1000 MG: 1 INJECTION, POWDER, LYOPHILIZED, FOR SOLUTION INTRAVENOUS at 13:50

## 2019-10-09 RX ADMIN — ROPIVACAINE HYDROCHLORIDE 30 ML: 5 INJECTION, SOLUTION EPIDURAL; INFILTRATION; PERINEURAL at 12:34

## 2019-10-09 RX ADMIN — ROPIVACAINE HYDROCHLORIDE 10 ML: 5 INJECTION, SOLUTION EPIDURAL; INFILTRATION; PERINEURAL at 16:01

## 2019-10-09 RX ADMIN — PROPOFOL 200 MG: 10 INJECTION, EMULSION INTRAVENOUS at 14:12

## 2019-10-09 RX ADMIN — DEXAMETHASONE SODIUM PHOSPHATE 4 MG: 4 INJECTION, SOLUTION INTRA-ARTICULAR; INTRALESIONAL; INTRAMUSCULAR; INTRAVENOUS; SOFT TISSUE at 14:23

## 2019-10-09 RX ADMIN — MORPHINE SULFATE 10 MG: 10 INJECTION, SOLUTION INTRAMUSCULAR; INTRAVENOUS at 15:36

## 2019-10-09 RX ADMIN — SODIUM CHLORIDE, POTASSIUM CHLORIDE, SODIUM LACTATE AND CALCIUM CHLORIDE: 600; 310; 30; 20 INJECTION, SOLUTION INTRAVENOUS at 11:20

## 2019-10-09 ASSESSMENT — PULMONARY FUNCTION TESTS
PIF_VALUE: 16
PIF_VALUE: 2
PIF_VALUE: 16
PIF_VALUE: 14
PIF_VALUE: 16
PIF_VALUE: 16
PIF_VALUE: 14
PIF_VALUE: 0
PIF_VALUE: 3
PIF_VALUE: 2
PIF_VALUE: 0
PIF_VALUE: 3
PIF_VALUE: 16
PIF_VALUE: 2
PIF_VALUE: 2
PIF_VALUE: 0
PIF_VALUE: 0
PIF_VALUE: 2
PIF_VALUE: 0
PIF_VALUE: 0
PIF_VALUE: 2
PIF_VALUE: 14
PIF_VALUE: 16
PIF_VALUE: 3
PIF_VALUE: 2
PIF_VALUE: 2
PIF_VALUE: 16
PIF_VALUE: 2
PIF_VALUE: 0
PIF_VALUE: 18
PIF_VALUE: 2
PIF_VALUE: 16
PIF_VALUE: 1
PIF_VALUE: 2
PIF_VALUE: 16
PIF_VALUE: 16
PIF_VALUE: 2
PIF_VALUE: 16
PIF_VALUE: 9
PIF_VALUE: 2
PIF_VALUE: 16
PIF_VALUE: 2
PIF_VALUE: 1
PIF_VALUE: 3
PIF_VALUE: 2
PIF_VALUE: 16
PIF_VALUE: 5
PIF_VALUE: 3
PIF_VALUE: 16
PIF_VALUE: 16
PIF_VALUE: 0
PIF_VALUE: 16
PIF_VALUE: 1
PIF_VALUE: 16
PIF_VALUE: 2
PIF_VALUE: 2
PIF_VALUE: 0
PIF_VALUE: 16
PIF_VALUE: 0
PIF_VALUE: 14
PIF_VALUE: 3
PIF_VALUE: 0
PIF_VALUE: 16
PIF_VALUE: 2
PIF_VALUE: 0
PIF_VALUE: 24
PIF_VALUE: 3
PIF_VALUE: 16
PIF_VALUE: 2
PIF_VALUE: 1
PIF_VALUE: 4
PIF_VALUE: 1
PIF_VALUE: 25
PIF_VALUE: 2
PIF_VALUE: 16
PIF_VALUE: 3
PIF_VALUE: 1
PIF_VALUE: 16
PIF_VALUE: 1
PIF_VALUE: 6
PIF_VALUE: 3
PIF_VALUE: 2
PIF_VALUE: 16
PIF_VALUE: 2
PIF_VALUE: 2
PIF_VALUE: 0
PIF_VALUE: 16
PIF_VALUE: 0
PIF_VALUE: 0
PIF_VALUE: 2
PIF_VALUE: 2

## 2019-10-09 ASSESSMENT — PAIN SCALES - GENERAL
PAINLEVEL_OUTOF10: 6
PAINLEVEL_OUTOF10: 7
PAINLEVEL_OUTOF10: 6
PAINLEVEL_OUTOF10: 10
PAINLEVEL_OUTOF10: 8

## 2019-10-09 ASSESSMENT — LIFESTYLE VARIABLES: SMOKING_STATUS: 1

## 2019-10-09 ASSESSMENT — PAIN - FUNCTIONAL ASSESSMENT: PAIN_FUNCTIONAL_ASSESSMENT: 0-10

## 2019-10-09 ASSESSMENT — COPD QUESTIONNAIRES: CAT_SEVERITY: MILD

## 2021-09-18 ENCOUNTER — APPOINTMENT (OUTPATIENT)
Dept: GENERAL RADIOLOGY | Age: 51
End: 2021-09-18
Payer: COMMERCIAL

## 2021-09-18 ENCOUNTER — HOSPITAL ENCOUNTER (EMERGENCY)
Age: 51
Discharge: HOME | End: 2021-09-18
Attending: EMERGENCY MEDICINE
Payer: COMMERCIAL

## 2021-09-18 VITALS
TEMPERATURE: 98.5 F | BODY MASS INDEX: 30.01 KG/M2 | DIASTOLIC BLOOD PRESSURE: 82 MMHG | HEIGHT: 68 IN | RESPIRATION RATE: 16 BRPM | OXYGEN SATURATION: 100 % | HEART RATE: 65 BPM | WEIGHT: 198 LBS | SYSTOLIC BLOOD PRESSURE: 119 MMHG

## 2021-09-18 DIAGNOSIS — J20.9 ACUTE BRONCHITIS, UNSPECIFIED ORGANISM: Primary | ICD-10-CM

## 2021-09-18 LAB
ALBUMIN SERPL-MCNC: 4.3 G/DL (ref 3.5–4.6)
ALP BLD-CCNC: 70 U/L (ref 40–130)
ALT SERPL-CCNC: 9 U/L (ref 0–33)
ANION GAP SERPL CALCULATED.3IONS-SCNC: 13 MEQ/L (ref 9–15)
AST SERPL-CCNC: 9 U/L (ref 0–35)
BASOPHILS ABSOLUTE: 0.1 K/UL (ref 0–0.1)
BASOPHILS RELATIVE PERCENT: 0.5 % (ref 0.1–1.2)
BILIRUB SERPL-MCNC: 0.3 MG/DL (ref 0.2–0.7)
BUN BLDV-MCNC: 14 MG/DL (ref 6–20)
CALCIUM SERPL-MCNC: 10 MG/DL (ref 8.5–9.9)
CHLORIDE BLD-SCNC: 103 MEQ/L (ref 95–107)
CO2: 22 MEQ/L (ref 20–31)
CREAT SERPL-MCNC: 0.76 MG/DL (ref 0.5–0.9)
EKG ATRIAL RATE: 70 BPM
EKG P AXIS: 42 DEGREES
EKG P-R INTERVAL: 146 MS
EKG Q-T INTERVAL: 376 MS
EKG QRS DURATION: 78 MS
EKG QTC CALCULATION (BAZETT): 406 MS
EKG R AXIS: 18 DEGREES
EKG T AXIS: 50 DEGREES
EKG VENTRICULAR RATE: 70 BPM
EOSINOPHILS ABSOLUTE: 0.2 K/UL (ref 0–0.4)
EOSINOPHILS RELATIVE PERCENT: 1.6 % (ref 0.7–5.8)
GFR AFRICAN AMERICAN: >60
GFR NON-AFRICAN AMERICAN: >60
GLOBULIN: 2.8 G/DL (ref 2.3–3.5)
GLUCOSE BLD-MCNC: 103 MG/DL (ref 70–99)
HCT VFR BLD CALC: 45.7 % (ref 37–47)
HEMOGLOBIN: 15 G/DL (ref 11.2–15.7)
IMMATURE GRANULOCYTES #: 0 K/UL
IMMATURE GRANULOCYTES %: 0.4 %
LYMPHOCYTES ABSOLUTE: 2.2 K/UL (ref 1.2–3.7)
LYMPHOCYTES RELATIVE PERCENT: 21.6 %
MAGNESIUM: 2 MG/DL (ref 1.7–2.4)
MCH RBC QN AUTO: 28.3 PG (ref 25.6–32.2)
MCHC RBC AUTO-ENTMCNC: 32.8 % (ref 32.2–35.5)
MCV RBC AUTO: 86.2 FL (ref 79.4–94.8)
MONOCYTES ABSOLUTE: 0.8 K/UL (ref 0.2–0.9)
MONOCYTES RELATIVE PERCENT: 7.4 % (ref 4.7–12.5)
NEUTROPHILS ABSOLUTE: 7 K/UL (ref 1.6–6.1)
NEUTROPHILS RELATIVE PERCENT: 68.5 % (ref 34–71.1)
PDW BLD-RTO: 15 % (ref 11.7–14.4)
PLATELET # BLD: 390 K/UL (ref 182–369)
POTASSIUM SERPL-SCNC: 4.8 MEQ/L (ref 3.4–4.9)
RBC # BLD: 5.3 M/UL (ref 3.93–5.22)
SARS-COV-2, NAAT: NOT DETECTED
SODIUM BLD-SCNC: 138 MEQ/L (ref 135–144)
STREP GRP A PCR: NEGATIVE
TOTAL PROTEIN: 7.1 G/DL (ref 6.3–8)
WBC # BLD: 10.3 K/UL (ref 4–10)

## 2021-09-18 PROCEDURE — 87651 STREP A DNA AMP PROBE: CPT

## 2021-09-18 PROCEDURE — 2580000003 HC RX 258: Performed by: EMERGENCY MEDICINE

## 2021-09-18 PROCEDURE — 83735 ASSAY OF MAGNESIUM: CPT

## 2021-09-18 PROCEDURE — 93005 ELECTROCARDIOGRAM TRACING: CPT

## 2021-09-18 PROCEDURE — 96374 THER/PROPH/DIAG INJ IV PUSH: CPT

## 2021-09-18 PROCEDURE — 71045 X-RAY EXAM CHEST 1 VIEW: CPT

## 2021-09-18 PROCEDURE — 87635 SARS-COV-2 COVID-19 AMP PRB: CPT

## 2021-09-18 PROCEDURE — 80053 COMPREHEN METABOLIC PANEL: CPT

## 2021-09-18 PROCEDURE — 93010 ELECTROCARDIOGRAM REPORT: CPT | Performed by: INTERNAL MEDICINE

## 2021-09-18 PROCEDURE — 6360000002 HC RX W HCPCS: Performed by: EMERGENCY MEDICINE

## 2021-09-18 PROCEDURE — 85025 COMPLETE CBC W/AUTO DIFF WBC: CPT

## 2021-09-18 PROCEDURE — 96375 TX/PRO/DX INJ NEW DRUG ADDON: CPT

## 2021-09-18 PROCEDURE — 99283 EMERGENCY DEPT VISIT LOW MDM: CPT

## 2021-09-18 PROCEDURE — 36415 COLL VENOUS BLD VENIPUNCTURE: CPT

## 2021-09-18 RX ORDER — AZITHROMYCIN 250 MG/1
TABLET, FILM COATED ORAL
Qty: 1 PACKET | Refills: 0 | Status: SHIPPED | OUTPATIENT
Start: 2021-09-18 | End: 2021-09-22

## 2021-09-18 RX ORDER — IBUPROFEN 800 MG/1
TABLET ORAL
COMMUNITY
Start: 2021-02-01

## 2021-09-18 RX ORDER — BENZTROPINE MESYLATE 1 MG/1
TABLET ORAL
COMMUNITY
Start: 2021-01-04 | End: 2022-04-22

## 2021-09-18 RX ORDER — METHYLPREDNISOLONE SODIUM SUCCINATE 125 MG/2ML
125 INJECTION, POWDER, LYOPHILIZED, FOR SOLUTION INTRAMUSCULAR; INTRAVENOUS ONCE
Status: COMPLETED | OUTPATIENT
Start: 2021-09-18 | End: 2021-09-18

## 2021-09-18 RX ORDER — ALPRAZOLAM 0.5 MG/1
0.5 TABLET ORAL 3 TIMES DAILY PRN
COMMUNITY

## 2021-09-18 RX ORDER — CETIRIZINE HYDROCHLORIDE 10 MG/1
TABLET ORAL DAILY
COMMUNITY
Start: 2020-10-27

## 2021-09-18 RX ORDER — DOXEPIN HYDROCHLORIDE 50 MG/1
CAPSULE ORAL
COMMUNITY
Start: 2021-08-23 | End: 2022-04-22

## 2021-09-18 RX ORDER — BUSPIRONE HYDROCHLORIDE 10 MG/1
20 TABLET ORAL 3 TIMES DAILY
COMMUNITY
Start: 2020-10-27

## 2021-09-18 RX ORDER — 0.9 % SODIUM CHLORIDE 0.9 %
1000 INTRAVENOUS SOLUTION INTRAVENOUS ONCE
Status: COMPLETED | OUTPATIENT
Start: 2021-09-18 | End: 2021-09-18

## 2021-09-18 RX ORDER — MONTELUKAST SODIUM 10 MG/1
TABLET ORAL NIGHTLY
COMMUNITY
Start: 2021-08-26

## 2021-09-18 RX ORDER — DULOXETIN HYDROCHLORIDE 60 MG/1
90 CAPSULE, DELAYED RELEASE ORAL DAILY
COMMUNITY
Start: 2021-06-10

## 2021-09-18 RX ORDER — ATORVASTATIN CALCIUM 10 MG/1
TABLET, FILM COATED ORAL
COMMUNITY
Start: 2021-09-01 | End: 2022-04-22

## 2021-09-18 RX ORDER — HYDROXYZINE PAMOATE 50 MG/1
CAPSULE ORAL
COMMUNITY
Start: 2021-08-22 | End: 2022-04-22

## 2021-09-18 RX ORDER — ROPINIROLE 1 MG/1
TABLET, FILM COATED ORAL
COMMUNITY
Start: 2020-11-03 | End: 2022-04-22

## 2021-09-18 RX ORDER — GUAIFENESIN 600 MG/1
1200 TABLET, EXTENDED RELEASE ORAL 2 TIMES DAILY
Qty: 40 TABLET | Refills: 0 | Status: SHIPPED | OUTPATIENT
Start: 2021-09-18 | End: 2021-09-28

## 2021-09-18 RX ORDER — KETOROLAC TROMETHAMINE 30 MG/ML
30 INJECTION, SOLUTION INTRAMUSCULAR; INTRAVENOUS ONCE
Status: COMPLETED | OUTPATIENT
Start: 2021-09-18 | End: 2021-09-18

## 2021-09-18 RX ORDER — PREDNISONE 10 MG/1
60 TABLET ORAL DAILY
Qty: 30 TABLET | Refills: 0 | Status: SHIPPED | OUTPATIENT
Start: 2021-09-18 | End: 2021-09-23

## 2021-09-18 RX ORDER — BENZONATATE 100 MG/1
100 CAPSULE ORAL 2 TIMES DAILY PRN
Qty: 20 CAPSULE | Refills: 0 | Status: SHIPPED | OUTPATIENT
Start: 2021-09-18 | End: 2021-09-25

## 2021-09-18 RX ORDER — BUDESONIDE AND FORMOTEROL FUMARATE DIHYDRATE 160; 4.5 UG/1; UG/1
AEROSOL RESPIRATORY (INHALATION)
COMMUNITY
Start: 2020-11-12 | End: 2022-04-22

## 2021-09-18 RX ORDER — METHYLPHENIDATE HYDROCHLORIDE 20 MG/1
TABLET ORAL 3 TIMES DAILY
COMMUNITY
Start: 2021-08-22

## 2021-09-18 RX ADMIN — METHYLPREDNISOLONE SODIUM SUCCINATE 125 MG: 125 INJECTION, POWDER, FOR SOLUTION INTRAMUSCULAR; INTRAVENOUS at 12:39

## 2021-09-18 RX ADMIN — SODIUM CHLORIDE 1000 ML: 9 INJECTION, SOLUTION INTRAVENOUS at 12:38

## 2021-09-18 RX ADMIN — KETOROLAC TROMETHAMINE 30 MG: 30 INJECTION, SOLUTION INTRAMUSCULAR; INTRAVENOUS at 12:40

## 2021-09-18 ASSESSMENT — ENCOUNTER SYMPTOMS
PHOTOPHOBIA: 0
APNEA: 0
RHINORRHEA: 0
SINUS PRESSURE: 0
SORE THROAT: 1
DIARRHEA: 0
VOMITING: 0
WHEEZING: 0
SHORTNESS OF BREATH: 1
ABDOMINAL DISTENTION: 0
COLOR CHANGE: 0
ABDOMINAL PAIN: 0
CONSTIPATION: 0
NAUSEA: 0
COUGH: 1
EYE PAIN: 0
BACK PAIN: 0

## 2021-09-18 ASSESSMENT — PAIN DESCRIPTION - ORIENTATION: ORIENTATION: MID

## 2021-09-18 ASSESSMENT — PAIN DESCRIPTION - FREQUENCY: FREQUENCY: CONTINUOUS

## 2021-09-18 ASSESSMENT — PAIN SCALES - GENERAL: PAINLEVEL_OUTOF10: 7

## 2021-09-18 ASSESSMENT — PAIN DESCRIPTION - PAIN TYPE: TYPE: ACUTE PAIN

## 2021-09-18 ASSESSMENT — PAIN DESCRIPTION - ONSET: ONSET: SUDDEN

## 2021-09-18 ASSESSMENT — PAIN DESCRIPTION - PROGRESSION: CLINICAL_PROGRESSION: GRADUALLY WORSENING

## 2021-09-18 ASSESSMENT — PAIN DESCRIPTION - DESCRIPTORS: DESCRIPTORS: PRESSURE;SQUEEZING

## 2021-09-18 ASSESSMENT — PAIN DESCRIPTION - LOCATION: LOCATION: CHEST

## 2021-09-18 NOTE — ED TRIAGE NOTES
Pt c/o SOB which began this morning. Pt woke with c/o sore throat and used her inhaler at 11:30 am today. Pt advised of plan of care during bedside exam by Torie Paige.

## 2021-09-18 NOTE — ED PROVIDER NOTES
[facility] ED  eMERGENCY dEPARTMENT eNCOUnter      Pt Name: Regina Fuller  MRN: [id number]  [place] 1970  Date of evaluation: 9/18/2021  Provider: Marisela Restrepo MD    CHIEF COMPLAINT       Chief Complaint   Patient presents with    Shortness of Breath     SOB started this morning, Hx of Asthma and COPD         HISTORY OF PRESENT ILLNESS   (Location/Symptom, Timing/Onset,Context/Setting, Quality, Duration, Modifying Factors, Severity)  Note limiting factors. Regina Fuller is a 54 y.o. female who presents to the emergency department with complaint of cough and congestion since this morning. Also shortness of breath and sore throat. Patient has autoimmune immune disease and was precluded from Covid shots. History of COPD. History of nicotine dependence. No fever or chills. No nausea or vomiting. No chest pain, palpitations, orthopnea or PND. Other comorbid conditions include costochondritis. HPI    Nursing Notes were reviewed. REVIEW OF SYSTEMS    (2-9 systems for level 4, 10 or more for level 5)     Review of Systems   Constitutional: Positive for fever. Negative for activity change, appetite change, chills and fatigue. HENT: Positive for sore throat. Negative for congestion, ear discharge, ear pain, hearing loss, rhinorrhea and sinus pressure. Eyes: Negative for photophobia, pain and visual disturbance. Respiratory: Positive for cough and shortness of breath. Negative for apnea and wheezing. Cardiovascular: Negative for chest pain, palpitations and leg swelling. Gastrointestinal: Negative for abdominal distention, abdominal pain, constipation, diarrhea, nausea and vomiting. Endocrine: Negative for cold intolerance, heat intolerance and polyuria. Genitourinary: Negative for dysuria, flank pain, frequency and urgency. Musculoskeletal: Positive for myalgias. Negative for arthralgias, back pain, gait problem and neck stiffness.    Skin: Negative for color change, pallor and rash. Allergic/Immunologic: Negative for food allergies and immunocompromised state. Neurological: Negative for dizziness, tremors, syncope, weakness, light-headedness and headaches. Psychiatric/Behavioral: Negative for agitation, confusion and hallucinations. All other systems reviewed and are negative. Except as noted above the remainder of the review of systems was reviewed and negative. PAST MEDICAL HISTORY     Past Medical History:   Diagnosis Date    ADHD     Anxiety     Asthma     COPD (chronic obstructive pulmonary disease) ([facility] [address]     Depression     Fibromyalgia     Syncope and collapse          SURGICAL HISTORY       Past Surgical History:   Procedure Laterality Date    ANKLE FRACTURE SURGERY Left 10/9/2019    LEFT OPEN REDUCTION INTERNAL FIXATION MALLEOLUS FRACTURE performed by Jeannie Mcknight DO at [facility] Right     2012    OTHER SURGICAL HISTORY      3 VAGINAL BIRTHS    TUBAL LIGATION      ENDOMETRIOSIS         CURRENT MEDICATIONS       Previous Medications    ACETAMINOPHEN (TYLENOL) 325 MG TABLET    Take 500 mg by mouth as needed     ALBUTEROL (PROVENTIL) (2.5 MG/3ML) 0.083% NEBULIZER SOLUTION    Take 3 mLs by nebulization every 6 hours as needed for Wheezing    ALBUTEROL SULFATE  (90 BASE) MCG/ACT INHALER    Inhale 2 puffs into the lungs every 6 hours as needed for Wheezing    ALPRAZOLAM (XANAX) 0.5 MG TABLET    Take 0.5 mg by mouth 3 times daily as needed for Sleep or Anxiety.     ATORVASTATIN (LIPITOR) 10 MG TABLET    Take by mouth    BENZTROPINE (COGENTIN) 1 MG TABLET    Take by mouth    BUDESONIDE-FORMOTEROL (SYMBICORT) 160-4.5 MCG/ACT AERO    Inhale into the lungs    BUSPIRONE (BUSPAR) 10 MG TABLET    Take by mouth    CETIRIZINE (ZYRTEC) 10 MG TABLET    Take by mouth    DOXEPIN (SINEQUAN) 50 MG CAPSULE    Take by mouth    DULOXETINE (CYMBALTA) 60 MG EXTENDED RELEASE CAPSULE    Take by mouth HYDROXYZINE (VISTARIL) 50 MG CAPSULE    Take by mouth    IBUPROFEN (ADVIL;MOTRIN) 800 MG TABLET    Take by mouth    IPRATROPIUM-ALBUTEROL (DUONEB) 0.5-2.5 (3) MG/3ML SOLN NEBULIZER SOLUTION    Inhale 3 mLs into the lungs every 6 hours    METHYLPHENIDATE (RITALIN) 20 MG TABLET    Take by mouth. METOPROLOL TARTRATE (LOPRESSOR) 25 MG TABLET    Take by mouth    MONTELUKAST (SINGULAIR) 10 MG TABLET    Take by mouth    NEBULIZERS (COMPRESSOR/NEBULIZER) MISC    Dispense portable nebulizer with supplies    ROPINIROLE (REQUIP) 1 MG TABLET    Take by mouth       ALLERGIES     Latex and Pcn [penicillins]    FAMILY HISTORY       Family History   Problem Relation Age of Onset    Arthritis Mother     Asthma Mother           SOCIAL HISTORY       Social History     Socioeconomic History    Marital status:      Spouse name: None    Number of children: None    Years of education: None    Highest education level: None   Occupational History    None   Tobacco Use    Smoking status: Current Every Day Smoker     Packs/day: 0.30    Smokeless tobacco: Never Used   Vaping Use    Vaping Use: Former   Substance and Sexual Activity    Alcohol use: Yes     Comment: socially    Drug use: No    Sexual activity: None   Other Topics Concern    None   Social History Narrative    None     Social Determinants of Health     Financial Resource Strain:     Difficulty of Paying Living Expenses:    Food Insecurity:     Worried About Running Out of Food in the Last Year:     Ran Out of Food in the Last Year:    Transportation Needs:     Lack of Transportation (Medical):      Lack of Transportation (Non-Medical):    Physical Activity:     Days of Exercise per Week:     Minutes of Exercise per Session:    Stress:     Feeling of Stress :    Social Connections:     Frequency of Communication with Friends and Family:     Frequency of Social Gatherings with Friends and Family:     Attends [employer] Services:     Active Member of Clubs or Organizations:     Attends Club or Organization Meetings:     Marital Status:    Intimate Partner Violence:     Fear of Current or Ex-Partner:     Emotionally Abused:     Physically Abused:     Sexually Abused:        SCREENINGS             PHYSICAL EXAM    (up to 7 for level 4, 8 or more for level 5)     ED Triage Vitals [09/18/21 1152]   BP Temp Temp Source Pulse Resp SpO2 Height Weight   (!) 155/102 98.5 °F (36.9 °C) Oral 85 20 100 % 5' 7.5\" (1.715 m) 198 lb (89.8 kg)       Physical Exam  Vitals and nursing note reviewed. Constitutional:       General: She is not in acute distress. Appearance: She is well-developed. She is obese. She is not ill-appearing, toxic-appearing or diaphoretic. Interventions: She is not intubated. HENT:      Head: Normocephalic and atraumatic. Nose: Nose normal.      Mouth/Throat:      Mouth: Mucous membranes are moist.      Pharynx: Pharyngeal swelling present. No oropharyngeal exudate. Eyes:      General: No scleral icterus. Right eye: No discharge. Left eye: No discharge. Conjunctiva/sclera: Conjunctivae normal.      Pupils: Pupils are equal, round, and reactive to light. Neck:      Thyroid: No thyromegaly. Vascular: No hepatojugular reflux or JVD. Trachea: No tracheal deviation. Cardiovascular:      Rate and Rhythm: Normal rate and regular rhythm. No extrasystoles are present. Pulses: Normal pulses. No decreased pulses. Heart sounds: Normal heart sounds. No murmur heard. No friction rub. No gallop. Pulmonary:      Effort: Pulmonary effort is normal. No tachypnea, bradypnea, accessory muscle usage or respiratory distress. She is not intubated. Breath sounds: No stridor. Decreased breath sounds and wheezing present. No rhonchi or rales. Chest:      Chest wall: No mass, deformity, tenderness, crepitus or edema. There is no dullness to percussion.    Abdominal:      General: Bowel sounds are normal. There is no distension. Palpations: Abdomen is soft. There is no hepatomegaly, splenomegaly or mass. Tenderness: There is no abdominal tenderness. There is no guarding or rebound. Musculoskeletal:         General: No tenderness or deformity. Normal range of motion. Cervical back: Normal range of motion and neck supple. Right lower leg: No tenderness. No edema. Left lower leg: No tenderness. No edema. Lymphadenopathy:      Cervical: No cervical adenopathy. Skin:     General: Skin is warm and dry. Coloration: Skin is not cyanotic or pale. Findings: No ecchymosis, erythema or rash. Nails: There is no clubbing. Neurological:      Mental Status: She is alert and oriented to person, place, and time. Cranial Nerves: No cranial nerve deficit. Motor: No weakness or abnormal muscle tone. Coordination: Coordination normal.      Deep Tendon Reflexes: Reflexes are normal and symmetric. Reflexes normal.   Psychiatric:         Mood and Affect: Mood normal. Mood is not anxious. Behavior: Behavior normal. Behavior is not agitated. Thought Content: Thought content normal.         Judgment: Judgment normal.         DIAGNOSTIC RESULTS     EKG: All EKG's are interpreted by the Emergency Department Physician who either signs or Co-signs this chart in the absence of a cardiologist.    Twelve-lead EKG shows normal sinus rhythm, rate 70 bpm, normal intervals, normal electrical axis, no acute ST-T wave changes. RADIOLOGY:   Non-plain film images such as CT, Ultrasound and MRI are read by the radiologist. Mayito Hernández radiographicimages are visualized and preliminarily interpreted by the emergency physician with the below findings:    Chest x-ray showed no acute cardiopulmonary process.     Interpretation per the Radiologist below, if available at the time of this note:    XR CHEST PORTABLE    (Results Pending)         ED BEDSIDE ULTRASOUND:   Performed by ED Physician - none    LABS:  Labs Reviewed   COMPREHENSIVE METABOLIC PANEL - Abnormal; Notable for the following components:       Result Value    Glucose 103 (*)     Calcium 10.0 (*)     All other components within normal limits   CBC WITH AUTO DIFFERENTIAL - Abnormal; Notable for the following components:    WBC 10.3 (*)     RBC 5.30 (*)     RDW 15.0 (*)     Platelets 773 (*)     Neutrophils Absolute 7.0 (*)     All other components within normal limits   COVID-19, RAPID   RAPID STREP SCREEN   MAGNESIUM   URINE RT REFLEX TO CULTURE       All other labs were within normal range or not returned as of this dictation. EMERGENCY DEPARTMENT COURSE and DIFFERENTIALDIAGNOSIS/MDM:   Vitals:    Vitals:    09/18/21 1152 09/18/21 1240   BP: (!) 155/102 119/82   Pulse: 85 65   Resp: 20 16   Temp: 98.5 °F (36.9 °C)    TempSrc: Oral    SpO2: 100% 100%   Weight: 198 lb (89.8 kg)    Height: 5' 7.5\" (1.715 m)            MDM  Number of Diagnoses or Management Options     Amount and/or Complexity of Data Reviewed  Clinical lab tests: reviewed and ordered  Tests in the radiology section of CPT®: reviewed and ordered  Tests in the medicine section of CPT®: ordered and reviewed    Risk of Complications, Morbidity, and/or Mortality  Presenting problems: moderate  Diagnostic procedures: moderate  Management options: moderate        CRITICAL CARE TIME   Total Critical Care time was  minutes, excluding separately reportable procedures. There was a high probability of clinically significant/life threatening deterioration in the patient's condition which required my urgentintervention. CONSULTS:  None    PROCEDURES:  Unless otherwise noted below, none     Procedures    FINAL IMPRESSION      1.  Acute bronchitis, unspecified organism          DISPOSITION/PLAN   DISPOSITION Decision To Discharge 09/18/2021 01:38:09 PM      PATIENT REFERRED TO:  Esmer Briones MD  [address] [place] [address] , [address] [phone]    In 3 days        DISCHARGE MEDICATIONS:  New Prescriptions    AZITHROMYCIN (ZITHROMAX Z-REMI) 250 MG TABLET    Take 2 tablets (500 mg) on Day 1, and then take 1 tablet (250 mg) on days 2 through 5.     BENZONATATE (TESSALON) 100 MG CAPSULE    Take 1 capsule by mouth 2 times daily as needed for Cough    GUAIFENESIN (MUCINEX) 600 MG EXTENDED RELEASE TABLET    Take 2 tablets by mouth 2 times daily for 10 days    PREDNISONE (DELTASONE) 10 MG TABLET    Take 6 tablets by mouth daily for 5 doses          (Please note that portions of this note were completed with a voice recognitionprogram.  Efforts were made to edit the dictations but occasionally words are mis-transcribed.)    Houston Cervantes MD (electronically signed)  Attending Emergency Physician          Houston Cervantes MD  09/18/21 [id number]

## 2021-09-20 ENCOUNTER — TELEPHONE (OUTPATIENT)
Dept: FAMILY MEDICINE CLINIC | Age: 51
End: 2021-09-20

## 2021-10-11 ENCOUNTER — HOSPITAL ENCOUNTER (OUTPATIENT)
Dept: CT IMAGING | Age: 51
Discharge: HOME | End: 2021-10-13
Payer: COMMERCIAL

## 2021-10-11 DIAGNOSIS — R06.00 DYSPNEA AND RESPIRATORY ABNORMALITY: ICD-10-CM

## 2021-10-11 DIAGNOSIS — R06.89 DYSPNEA AND RESPIRATORY ABNORMALITY: ICD-10-CM

## 2021-10-11 PROCEDURE — 71250 CT THORAX DX C-: CPT

## 2022-04-22 ENCOUNTER — HOSPITAL ENCOUNTER (OUTPATIENT)
Dept: MRI IMAGING | Age: 52
Discharge: HOME | End: 2022-04-24
Payer: MEDICARE

## 2022-04-22 ENCOUNTER — ANESTHESIA (OUTPATIENT)
Dept: MRI IMAGING | Age: 52
End: 2022-04-22

## 2022-04-22 ENCOUNTER — ANESTHESIA EVENT (OUTPATIENT)
Dept: MRI IMAGING | Age: 52
End: 2022-04-22

## 2022-04-22 VITALS
TEMPERATURE: 99 F | DIASTOLIC BLOOD PRESSURE: 73 MMHG | HEART RATE: 75 BPM | SYSTOLIC BLOOD PRESSURE: 134 MMHG | OXYGEN SATURATION: 97 % | RESPIRATION RATE: 16 BRPM

## 2022-04-22 VITALS
SYSTOLIC BLOOD PRESSURE: 104 MMHG | DIASTOLIC BLOOD PRESSURE: 56 MMHG | OXYGEN SATURATION: 100 % | RESPIRATION RATE: 18 BRPM

## 2022-04-22 DIAGNOSIS — R40.20 LOSS OF CONSCIOUSNESS (HCC): ICD-10-CM

## 2022-04-22 PROCEDURE — 70553 MRI BRAIN STEM W/O & W/DYE: CPT

## 2022-04-22 PROCEDURE — 6360000004 HC RX CONTRAST MEDICATION: Performed by: PSYCHIATRY & NEUROLOGY

## 2022-04-22 PROCEDURE — 3700000000 HC ANESTHESIA ATTENDED CARE

## 2022-04-22 PROCEDURE — 2500000003 HC RX 250 WO HCPCS: Performed by: NURSE ANESTHETIST, CERTIFIED REGISTERED

## 2022-04-22 PROCEDURE — A9579 GAD-BASE MR CONTRAST NOS,1ML: HCPCS | Performed by: PSYCHIATRY & NEUROLOGY

## 2022-04-22 PROCEDURE — 7100000010 HC PHASE II RECOVERY - FIRST 15 MIN

## 2022-04-22 PROCEDURE — 2580000003 HC RX 258: Performed by: NURSE ANESTHETIST, CERTIFIED REGISTERED

## 2022-04-22 PROCEDURE — 7100000011 HC PHASE II RECOVERY - ADDTL 15 MIN

## 2022-04-22 PROCEDURE — 3700000001 HC ADD 15 MINUTES (ANESTHESIA)

## 2022-04-22 RX ORDER — SODIUM CHLORIDE 0.9 % (FLUSH) 0.9 %
5-40 SYRINGE (ML) INJECTION EVERY 12 HOURS SCHEDULED
Status: DISCONTINUED | OUTPATIENT
Start: 2022-04-22 | End: 2022-04-25 | Stop reason: HOSPADM

## 2022-04-22 RX ORDER — LIDOCAINE HYDROCHLORIDE 20 MG/ML
INJECTION, SOLUTION INFILTRATION; PERINEURAL PRN
Status: DISCONTINUED | OUTPATIENT
Start: 2022-04-22 | End: 2022-04-22 | Stop reason: SDUPTHER

## 2022-04-22 RX ORDER — ONDANSETRON 2 MG/ML
4 INJECTION INTRAMUSCULAR; INTRAVENOUS
Status: ACTIVE | OUTPATIENT
Start: 2022-04-22 | End: 2022-04-22

## 2022-04-22 RX ORDER — PROPOFOL 10 MG/ML
INJECTION, EMULSION INTRAVENOUS PRN
Status: DISCONTINUED | OUTPATIENT
Start: 2022-04-22 | End: 2022-04-22 | Stop reason: SDUPTHER

## 2022-04-22 RX ORDER — SODIUM CHLORIDE 0.9 % (FLUSH) 0.9 %
10 SYRINGE (ML) INJECTION PRN
Status: DISCONTINUED | OUTPATIENT
Start: 2022-04-22 | End: 2022-04-25 | Stop reason: HOSPADM

## 2022-04-22 RX ORDER — OXYCODONE HYDROCHLORIDE 5 MG/1
10 TABLET ORAL PRN
Status: ACTIVE | OUTPATIENT
Start: 2022-04-22 | End: 2022-04-22

## 2022-04-22 RX ORDER — SODIUM CHLORIDE 9 MG/ML
25 INJECTION, SOLUTION INTRAVENOUS PRN
Status: DISCONTINUED | OUTPATIENT
Start: 2022-04-22 | End: 2022-04-25 | Stop reason: HOSPADM

## 2022-04-22 RX ORDER — FENTANYL CITRATE 50 UG/ML
50 INJECTION, SOLUTION INTRAMUSCULAR; INTRAVENOUS EVERY 10 MIN PRN
Status: DISCONTINUED | OUTPATIENT
Start: 2022-04-22 | End: 2022-04-25 | Stop reason: HOSPADM

## 2022-04-22 RX ORDER — GLYCOPYRROLATE 1 MG/5 ML
SYRINGE (ML) INTRAVENOUS PRN
Status: DISCONTINUED | OUTPATIENT
Start: 2022-04-22 | End: 2022-04-22 | Stop reason: SDUPTHER

## 2022-04-22 RX ORDER — DIPHENHYDRAMINE HYDROCHLORIDE 50 MG/ML
12.5 INJECTION INTRAMUSCULAR; INTRAVENOUS
Status: ACTIVE | OUTPATIENT
Start: 2022-04-22 | End: 2022-04-22

## 2022-04-22 RX ORDER — SODIUM CHLORIDE, SODIUM LACTATE, POTASSIUM CHLORIDE, CALCIUM CHLORIDE 600; 310; 30; 20 MG/100ML; MG/100ML; MG/100ML; MG/100ML
INJECTION, SOLUTION INTRAVENOUS CONTINUOUS PRN
Status: DISCONTINUED | OUTPATIENT
Start: 2022-04-22 | End: 2022-04-22 | Stop reason: SDUPTHER

## 2022-04-22 RX ORDER — MEPERIDINE HYDROCHLORIDE 25 MG/ML
12.5 INJECTION INTRAMUSCULAR; INTRAVENOUS; SUBCUTANEOUS
Status: ACTIVE | OUTPATIENT
Start: 2022-04-22 | End: 2022-04-22

## 2022-04-22 RX ORDER — SODIUM CHLORIDE 0.9 % (FLUSH) 0.9 %
5-40 SYRINGE (ML) INJECTION PRN
Status: DISCONTINUED | OUTPATIENT
Start: 2022-04-22 | End: 2022-04-25 | Stop reason: HOSPADM

## 2022-04-22 RX ORDER — METOCLOPRAMIDE HYDROCHLORIDE 5 MG/ML
10 INJECTION INTRAMUSCULAR; INTRAVENOUS
Status: ACTIVE | OUTPATIENT
Start: 2022-04-22 | End: 2022-04-22

## 2022-04-22 RX ORDER — PROPOFOL 10 MG/ML
INJECTION, EMULSION INTRAVENOUS CONTINUOUS PRN
Status: DISCONTINUED | OUTPATIENT
Start: 2022-04-22 | End: 2022-04-22 | Stop reason: SDUPTHER

## 2022-04-22 RX ORDER — OXYCODONE HYDROCHLORIDE 5 MG/1
5 TABLET ORAL PRN
Status: ACTIVE | OUTPATIENT
Start: 2022-04-22 | End: 2022-04-22

## 2022-04-22 RX ADMIN — GADOTERIDOL 20 ML: 279.3 INJECTION, SOLUTION INTRAVENOUS at 15:06

## 2022-04-22 RX ADMIN — PROPOFOL 140 MCG/KG/MIN: 10 INJECTION, EMULSION INTRAVENOUS at 14:42

## 2022-04-22 RX ADMIN — PROPOFOL 60 MG: 10 INJECTION, EMULSION INTRAVENOUS at 14:34

## 2022-04-22 RX ADMIN — Medication 0.2 MG: at 14:41

## 2022-04-22 RX ADMIN — LIDOCAINE HYDROCHLORIDE 80 MG: 20 INJECTION, SOLUTION INFILTRATION; PERINEURAL at 14:34

## 2022-04-22 RX ADMIN — SODIUM CHLORIDE, POTASSIUM CHLORIDE, SODIUM LACTATE AND CALCIUM CHLORIDE: 600; 310; 30; 20 INJECTION, SOLUTION INTRAVENOUS at 14:25

## 2022-04-22 NOTE — ANESTHESIA PRE PROCEDURE
Department of Anesthesiology  Preprocedure Note       Name:  Nicolás Flood   Age:  54 y.o.  :  1970                                          MRN:  [id number]         Date:  2022      Surgeon: * No surgeons listed *    Procedure: * No procedures listed *    Medications prior to admission:   Prior to Admission medications    Medication Sig Start Date End Date Taking? Authorizing Provider   busPIRone (BUSPAR) 10 MG tablet Take by mouth 10/27/20   Historical Provider, MD   cetirizine (ZYRTEC) 10 MG tablet Take by mouth 10/27/20   Historical Provider, MD   DULoxetine (CYMBALTA) 60 MG extended release capsule Take by mouth 6/10/21   Historical Provider, MD   methylphenidate (RITALIN) 20 MG tablet Take by mouth. 21   Historical Provider, MD   metoprolol tartrate (LOPRESSOR) 25 MG tablet Take by mouth 10/27/20   Historical Provider, MD   montelukast (SINGULAIR) 10 MG tablet Take by mouth 21   Historical Provider, MD   ibuprofen (ADVIL;MOTRIN) 800 MG tablet Take by mouth 21   Historical Provider, MD   ALPRAZolam Saddie Elva) 0.5 MG tablet Take 0.5 mg by mouth 3 times daily as needed for Sleep or Anxiety.     Historical Provider, MD   acetaminophen (TYLENOL) 325 MG tablet Take 500 mg by mouth as needed  14   Historical Provider, MD   Nebulizers (COMPRESSOR/NEBULIZER) MISC Dispense portable nebulizer with supplies 19   Yadi Yeh MD   albuterol sulfate  (90 Base) MCG/ACT inhaler Inhale 2 puffs into the lungs every 6 hours as needed for Wheezing 18   Yadi Yeh MD   ipratropium-albuterol (DUONEB) 0.5-2.5 (3) MG/3ML SOLN nebulizer solution Inhale 3 mLs into the lungs every 6 hours 18   Ivanna Kay MD   albuterol (PROVENTIL) (2.5 MG/3ML) 0.083% nebulizer solution Take 3 mLs by nebulization every 6 hours as needed for Wheezing 18   Ivanna Kay MD       Current medications:    Current Outpatient Medications   Medication Sig Dispense Refill    busPIRone (BUSPAR) 10 MG tablet Take by mouth      cetirizine (ZYRTEC) 10 MG tablet Take by mouth      DULoxetine (CYMBALTA) 60 MG extended release capsule Take by mouth      methylphenidate (RITALIN) 20 MG tablet Take by mouth.  metoprolol tartrate (LOPRESSOR) 25 MG tablet Take by mouth      montelukast (SINGULAIR) 10 MG tablet Take by mouth      ibuprofen (ADVIL;MOTRIN) 800 MG tablet Take by mouth      ALPRAZolam (XANAX) 0.5 MG tablet Take 0.5 mg by mouth 3 times daily as needed for Sleep or Anxiety.       acetaminophen (TYLENOL) 325 MG tablet Take 500 mg by mouth as needed       Nebulizers (COMPRESSOR/NEBULIZER) MISC Dispense portable nebulizer with supplies 1 each 0    albuterol sulfate  (90 Base) MCG/ACT inhaler Inhale 2 puffs into the lungs every 6 hours as needed for Wheezing 1 Inhaler 5    ipratropium-albuterol (DUONEB) 0.5-2.5 (3) MG/3ML SOLN nebulizer solution Inhale 3 mLs into the lungs every 6 hours 360 mL 5    albuterol (PROVENTIL) (2.5 MG/3ML) 0.083% nebulizer solution Take 3 mLs by nebulization every 6 hours as needed for Wheezing 120 each 3     Current Facility-Administered Medications   Medication Dose Route Frequency Provider Last Rate Last Admin    sodium chloride flush 0.9 % injection 5-40 mL  5-40 mL IntraVENous 2 times per day Sujatha Quiñonez MD        sodium chloride flush 0.9 % injection 5-40 mL  5-40 mL IntraVENous PRN Sujatha Quiñonez MD        0.9 % sodium chloride infusion  25 mL IntraVENous PRN Sujatha Quiñonez MD        meperidine (DEMEROL) injection 12.5 mg  12.5 mg IntraVENous Once PRN Sujatha Quiñonez MD        fentaNYL (SUBLIMAZE) injection 50 mcg  50 mcg IntraVENous Q10 Min PRN Sujatha Quiñonez MD        HYDROmorphone (DILAUDID) injection 0.5 mg  0.5 mg IntraVENous Q10 Min PRN Sujatha Quiñonez MD        oxyCODONE (ROXICODONE) immediate release tablet 5 mg  5 mg Oral PRN Sujatha Qiuñonez MD        Or   [facility] oxyCODONE (ROXICODONE) immediate release tablet 10 mg  10 mg Oral PRN Regulo Beckford MD        ondansetron [facility]) injection 4 mg  4 mg IntraVENous Once PRN Regulo Beckford MD        metoclopramide [facility]) injection 10 mg  10 mg IntraVENous Once PRN Regulo Bekcford MD        diphenhydrAMINE (BENADRYL) injection 12.5 mg  12.5 mg IntraVENous Once PRN Regulo Beckford MD           Allergies: Allergies   Allergen Reactions    Latex Hives and Rash    Pcn [Penicillins] Anaphylaxis    Seroquel [Quetiapine] Other (See Comments) and Hallucinations     Psychotic       Problem List:    Patient Active Problem List   Diagnosis Code    Asthma exacerbation, mild J45. 901    Costochondritis, acute M94.0    Chest pain R07.9    Chronic lumbar pain M54.50, G89.29    Depression F32. A    Tobacco abuse Z72.0    Severe persistent asthma with acute exacerbation J45.51    Moderate persistent asthma J45.40       Past Medical History:        Diagnosis Date    ADHD     Anxiety     Asthma     COPD [facility]chronic obstructive [facility] disease) ([facility]     Depression     Fibromyalgia     Syncope and collapse        Past Surgical History:        Procedure Laterality Date    ANKLE FRACTURE SURGERY Left 10/9/2019    LEFT OPEN REDUCTION INTERNAL FIXATION MALLEOLUS FRACTURE performed by Kermit Gagnon DO at [facility]     2012    OTHER SURGICAL HISTORY      3 VAGINAL BIRTHS    TUBAL LIGATION      ENDOMETRIOSIS       Social History:    Social History     Tobacco Use    Smoking status: Current Every Day Smoker     Packs/day: 0.30    Smokeless tobacco: Never Used   Substance Use Topics    Alcohol use: Yes     Comment: socially                                Ready to quit: Not Answered  Counseling given: Not Answered      Vital Signs (Current): There were no vitals filed for this visit.                                            BP Readings from Last 3 Encounters: 09/18/21 119/82   10/09/19 (!) 166/79   10/09/19 (!) 148/83       NPO Status:                                                                                 BMI:   Wt Readings from Last 3 Encounters:   09/18/21 198 lb (89.8 kg)   10/09/19 198 lb (89.8 kg)   10/02/19 198 lb (89.8 kg)     There is no height or weight on file to calculate BMI.    CBC:   Lab Results   Component Value Date    WBC 10.3 09/18/2021    RBC 5.30 09/18/2021    RBC 4.37 02/15/2018    HGB 15.0 09/18/2021    HCT 45.7 09/18/2021    MCV 86.2 09/18/2021    RDW 15.0 09/18/2021     09/18/2021       CMP:   Lab Results   Component Value Date     09/18/2021    K 4.8 09/18/2021     09/18/2021    CO2 22 09/18/2021    BUN 14 09/18/2021    CREATININE 0.76 09/18/2021    GFRAA >60.0 09/18/2021    AGRATIO 1.6 02/15/2018    LABGLOM >60.0 09/18/2021    GLUCOSE 103 09/18/2021    GLUCOSE 123 02/15/2018    PROT 7.1 09/18/2021    CALCIUM 10.0 09/18/2021    BILITOT 0.3 09/18/2021    ALKPHOS 70 09/18/2021    AST 9 09/18/2021    ALT 9 09/18/2021       POC Tests: No results for input(s): POCGLU, POCNA, POCK, POCCL, POCBUN, POCHEMO, POCHCT in the last 72 hours.     Coags:   Lab Results   Component Value Date    PROTIME 10.9 02/13/2018    INR 0.98 02/13/2018    APTT 28.5 12/19/2013       HCG (If Applicable):   Lab Results   Component Value Date    PREGTESTUR Negative 01/07/2014        ABGs:   Lab Results   Component Value Date    EVQ0UGJ 20.2 09/24/2012    B0KBCJHE 98 09/24/2012        Type & Screen (If Applicable):  No results found for: LABABO, LABRH    Drug/Infectious Status (If Applicable):  No results found for: HIV, HEPCAB    COVID-19 Screening (If Applicable):   Lab Results   Component Value Date    COVID19 Not Detected 09/18/2021           Anesthesia Evaluation  Patient summary reviewed and Nursing notes reviewed no history of anesthetic complications:   Airway: Mallampati: II  TM distance: >3 FB   Neck ROM: full  Mouth opening: > = 3 FB Dental: normal exam         Pulmonary:normal exam    (+) COPD:  asthma:                            Cardiovascular:Negative CV ROS                      Neuro/Psych:   (+) neuromuscular disease:,             GI/Hepatic/Renal:   (+) morbid obesity          Endo/Other: Negative Endo/Other ROS                    Abdominal:   (+) obese,           Vascular: negative vascular ROS. Other Findings:             Anesthesia Plan      MAC     ASA 3       Induction: intravenous. MIPS: Prophylactic antiemetics administered. Anesthetic plan and risks discussed with patient. Plan discussed with CRNA.     Attending anesthesiologist reviewed and agrees with Preprocedure content              Ced White MD   4/22/2022

## 2022-04-22 NOTE — ANESTHESIA POSTPROCEDURE EVALUATION
Department of Anesthesiology  Postprocedure Note    Patient: Gt Almaguer  MRN: [id number]  YOB: 1970  Date of evaluation: 4/22/2022  Time:  3:30 PM     Procedure Summary     Date: 04/22/22 Room / Location: [facility]    Anesthesia Start: 5993 Anesthesia Stop:     Procedure: MRI BRAIN W WO CONTRAST Diagnosis: Loss of consciousness ([address]    Scheduled Providers:  Responsible Provider: RODRIGO Kauffman CRNA    Anesthesia Type: MAC ASA Status: 3          Anesthesia Type: No value filed. Jocelyn Phase I:      Jocelyn Phase II:      Last vitals: Reviewed and per EMR flowsheets.        Anesthesia Post Evaluation    Patient location during evaluation: PACU  Patient participation: complete - patient participated  Level of consciousness: awake and alert  Pain score: 1  Airway patency: patent  Nausea & Vomiting: no nausea and no vomiting  Complications: no  Cardiovascular status: hemodynamically stable  Respiratory status: acceptable and room air  Hydration status: euvolemic  Comments: Report to RN, normal sinus rhythm

## 2022-06-22 ENCOUNTER — HOSPITAL ENCOUNTER (OUTPATIENT)
Dept: LAB | Age: 52
Discharge: HOME | End: 2022-06-22
Payer: MEDICARE

## 2022-06-22 LAB
C-REACTIVE PROTEIN, HIGH SENSITIVITY: 1.3 MG/L (ref 0–5)
HBA1C MFR BLD: 5.4 % (ref 4.8–5.9)
SEDIMENTATION RATE, ERYTHROCYTE: 8 MM (ref 0–20)
T4 FREE: 0.91 NG/DL (ref 0.84–1.68)
TSH SERPL DL<=0.05 MIU/L-ACNC: 2.55 UIU/ML (ref 0.44–3.86)

## 2022-06-22 PROCEDURE — 83036 HEMOGLOBIN GLYCOSYLATED A1C: CPT

## 2022-06-22 PROCEDURE — 84443 ASSAY THYROID STIM HORMONE: CPT

## 2022-06-22 PROCEDURE — 36415 COLL VENOUS BLD VENIPUNCTURE: CPT

## 2022-06-22 PROCEDURE — 83921 ORGANIC ACID SINGLE QUANT: CPT

## 2022-06-22 PROCEDURE — 86788 WEST NILE VIRUS AB IGM: CPT

## 2022-06-22 PROCEDURE — 86141 C-REACTIVE PROTEIN HS: CPT

## 2022-06-22 PROCEDURE — 82607 VITAMIN B-12: CPT

## 2022-06-22 PROCEDURE — 86039 ANTINUCLEAR ANTIBODIES (ANA): CPT

## 2022-06-22 PROCEDURE — 84207 ASSAY OF VITAMIN B-6: CPT

## 2022-06-22 PROCEDURE — 83090 ASSAY OF HOMOCYSTEINE: CPT

## 2022-06-22 PROCEDURE — 86789 WEST NILE VIRUS ANTIBODY: CPT

## 2022-06-22 PROCEDURE — 86617 LYME DISEASE ANTIBODY: CPT

## 2022-06-22 PROCEDURE — 86431 RHEUMATOID FACTOR QUANT: CPT

## 2022-06-22 PROCEDURE — 84165 PROTEIN E-PHORESIS SERUM: CPT

## 2022-06-22 PROCEDURE — 84156 ASSAY OF PROTEIN URINE: CPT

## 2022-06-22 PROCEDURE — 86592 SYPHILIS TEST NON-TREP QUAL: CPT

## 2022-06-22 PROCEDURE — 84155 ASSAY OF PROTEIN SERUM: CPT

## 2022-06-22 PROCEDURE — 84439 ASSAY OF FREE THYROXINE: CPT

## 2022-06-22 PROCEDURE — 85652 RBC SED RATE AUTOMATED: CPT

## 2022-06-22 PROCEDURE — 83520 IMMUNOASSAY QUANT NOS NONAB: CPT

## 2022-06-23 LAB
HOMOCYSTEINE: 23 UMOL/L
RHEUMATOID FACTOR: <10 IU/ML
RPR: NORMAL
VITAMIN B-12: 246 PG/ML (ref 232–1245)

## 2022-06-25 LAB
HOURS COLLECTED: NORMAL HR
KAPPA QUANT FREE LIGHT CHAINS: 7.05 MG/L (ref 0–32.9)
LAMBDA FREE LIGHT CHAINS URINE/ VOL: 1.11 MG/L (ref 0–3.79)
LYME (B. BURGDORFERI) AB IGG WB: NEGATIVE
LYME AB IGM BY WB:: NEGATIVE
PROTEIN PROTEIN: NORMAL MG/D
URINE FREE KAPPA EXCRETION/DAY: NORMAL MG/D
URINE FREE LAMBDA EXCRETION/DAY: NORMAL MG/D
URINE TOTAL VOLUME: NORMAL ML
WEST NILE VIRUS, IGG: 0.14 IV
WEST NILE VIRUS, IGM: 0 IV

## 2022-06-26 LAB
ALBUMIN SERPL-MCNC: 4.55 G/DL (ref 3.75–5.01)
ALPHA-1-GLOBULIN: 0.35 G/DL (ref 0.19–0.46)
ALPHA-2-GLOBULIN: 0.77 G/DL (ref 0.48–1.05)
ANTINUCLEAR AB INTERPRETIVE COMMENT: NORMAL
ANTINUCLEAR ANTIBODY, HEP-2, IGG: NORMAL
BETA GLOBULIN: 0.91 G/DL (ref 0.48–1.1)
GAMMA GLOBULIN: 0.92 G/DL (ref 0.62–1.51)
PROTEIN ELECTROPHORESIS, SERUM: NORMAL
SPE/IFE INTERPRETATION: NORMAL
TOTAL PROTEIN: 7.5 G/DL (ref 6.3–8.2)
VITAMIN B6: 36.1 NMOL/L (ref 20–125)

## 2022-06-28 LAB — METHYLMALONIC ACID: 0.31 UMOL/L (ref 0–0.4)

## 2022-07-13 ENCOUNTER — OFFICE VISIT (OUTPATIENT)
Dept: PAIN MANAGEMENT | Age: 52
End: 2022-07-13
Payer: MEDICARE

## 2022-07-13 VITALS — HEIGHT: 67 IN | BODY MASS INDEX: 29.03 KG/M2 | WEIGHT: 185 LBS | TEMPERATURE: 97.1 F

## 2022-07-13 DIAGNOSIS — M25.552 BILATERAL HIP PAIN: ICD-10-CM

## 2022-07-13 DIAGNOSIS — M43.16 ANTEROLISTHESIS OF LUMBAR SPINE: ICD-10-CM

## 2022-07-13 DIAGNOSIS — M46.1 BILATERAL SACROILIITIS (HCC): Primary | ICD-10-CM

## 2022-07-13 DIAGNOSIS — M25.551 BILATERAL HIP PAIN: ICD-10-CM

## 2022-07-13 PROCEDURE — 99214 OFFICE O/P EST MOD 30 MIN: CPT | Performed by: NURSE PRACTITIONER

## 2022-07-13 PROCEDURE — 4004F PT TOBACCO SCREEN RCVD TLK: CPT | Performed by: NURSE PRACTITIONER

## 2022-07-13 PROCEDURE — G8427 DOCREV CUR MEDS BY ELIG CLIN: HCPCS | Performed by: NURSE PRACTITIONER

## 2022-07-13 PROCEDURE — G8419 CALC BMI OUT NRM PARAM NOF/U: HCPCS | Performed by: NURSE PRACTITIONER

## 2022-07-13 ASSESSMENT — ENCOUNTER SYMPTOMS
DIARRHEA: 0
BACK PAIN: 1
RESPIRATORY NEGATIVE: 1
CONSTIPATION: 0

## 2022-07-13 NOTE — PROGRESS NOTES
Patient: Joss Silva  YOB: 1970  Date: 7/13/22        Subjective:     Joss Silva is a 54 y.o. female who complains today of:    Chief Complaint   Patient presents with    Back Pain         Allergies:  Latex, Pcn [penicillins], and Seroquel [quetiapine]    Past Medical History:   Diagnosis Date    ADHD     Anxiety     Asthma     COPD (chronic obstructive pulmonary disease) ([address]     Depression     Fibromyalgia     Migraine     Syncope and collapse      Past Surgical History:   Procedure Laterality Date    ANKLE FRACTURE SURGERY Left 10/9/2019    LEFT OPEN REDUCTION INTERNAL FIXATION MALLEOLUS FRACTURE performed by Verónica Pavon DO at [facility]     2012    OTHER SURGICAL HISTORY      3 VAGINAL BIRTHS    TUBAL LIGATION      ENDOMETRIOSIS     Family History   Problem Relation Age of Onset    Arthritis Mother     Asthma Mother      Social History     Socioeconomic History    Marital status:      Spouse name: Not on file    Number of children: Not on file    Years of education: Not on file    Highest education level: Not on file   Occupational History    Not on file   Tobacco Use    Smoking status: Current Every Day Smoker     Packs/day: 0.30    Smokeless tobacco: Never Used   Vaping Use    Vaping Use: Former   Substance and Sexual Activity    Alcohol use: Yes     Comment: socially    Drug use: No    Sexual activity: Not on file   Other Topics Concern    Not on file   Social History Narrative    Not on file     Social Determinants of Health     Financial Resource Strain:     Difficulty of Paying Living Expenses: Not on file   Food Insecurity:     Worried About [address] in the Last Year: Not on file    [address] in the Last Year: Not on file   Transportation Needs:     Lack of Transportation (Medical): Not on file    Lack of Transportation (Non-Medical):  Not on file Physical Activity:     Days of Exercise per Week: Not on file    Minutes of Exercise per Session: Not on file   Stress:     Feeling of Stress : Not on file   Social Connections:     Frequency of Communication with Friends and Family: Not on file    Frequency of Social Gatherings with Friends and Family: Not on file    Attends [employer] Services: Not on file    Active Member of [address] [place] [address] or Organizations: Not on file    Attends Club or Organization Meetings: Not on file    Marital Status: Not on file   Intimate Partner Violence:     Fear of Current or Ex-Partner: Not on file    Emotionally Abused: Not on file    Physically Abused: Not on file    Sexually Abused: Not on file   Housing Stability:     Unable to Pay for Housing in the Last Year: Not on file    Number of [place] in the Last Year: Not on file    Unstable Housing in the Last Year: Not on file       Current Outpatient Medications on File Prior to Visit   Medication Sig Dispense Refill    SUMAtriptan Succinate (IMITREX PO) Take 100 mg by mouth as needed      Topiramate (TOPAMAX PO) Take by mouth every 12 hours      Fremanezumab-vfrm (AJOVY SC) Inject into the skin      busPIRone (BUSPAR) 10 MG tablet Take 20 mg by mouth 3 times daily       cetirizine (ZYRTEC) 10 MG tablet Take by mouth daily       DULoxetine (CYMBALTA) 60 MG extended release capsule Take 90 mg by mouth daily       methylphenidate (RITALIN) 20 MG tablet Take by mouth 3 times daily.  metoprolol tartrate (LOPRESSOR) 25 MG tablet Take 12.5 mg by mouth 2 times daily       montelukast (SINGULAIR) 10 MG tablet Take by mouth nightly       ibuprofen (ADVIL;MOTRIN) 800 MG tablet Take by mouth      ALPRAZolam (XANAX) 0.5 MG tablet Take 0.5 mg by mouth 3 times daily as needed for Sleep or Anxiety.  And 1 mg at HS      acetaminophen (TYLENOL) 325 MG tablet Take 500 mg by mouth as needed       Nebulizers (COMPRESSOR/NEBULIZER) MISC Dispense portable nebulizer with supplies 1 each 0    albuterol sulfate  (90 Base) MCG/ACT inhaler Inhale 2 puffs into the lungs every 6 hours as needed for Wheezing 1 Inhaler 5    ipratropium-albuterol (DUONEB) 0.5-2.5 (3) MG/3ML SOLN nebulizer solution Inhale 3 mLs into the lungs every 6 hours 360 mL 5    albuterol (PROVENTIL) (2.5 MG/3ML) 0.083% nebulizer solution Take 3 mLs by nebulization every 6 hours as needed for Wheezing 120 each 3     No current facility-administered medications on file prior to visit. 81-year-old female here today to follow-up chronic low back pain. Her low back pain goes across low back left side worse than right also groin pain left worse than right. Difficulty lifting her left leg at times due to groin pain. She is asking for SI joint injections that helped significantly before. Last seen 10/20/2019 with Dr. Mauricio Aquino for bilateral medial branch block L3-4-5 with no relief. Has immune deficiency and receives gamma globulin so she hasn't been out due to covid fear. Says that SI injections did help. [place] working [place] [employer] due to syncope. Says she has to have a , has been in ICU at [facility]. All testing so far negative per Dr Jacqueline Orourke and Dr Meron Narvaez. Gained weight up to 220# then started working out and lost weight but her left SI joint is severe, right minimal pain. Performs HEP daily. No pain meds, occasional OTC. She states she was told by Dr Kamaljit Shin that she would eventually need back surgery. Previously followed with [facility]. X-ray 9/16/2019 chronic L5 spondylolysis grade 2 anterolisthesis, probable fusion. HX: right total knee, left fibula ORIF    Back Pain  Pertinent negatives include no headaches, numbness or weakness. Review of Systems   Constitutional: Negative. Negative for activity change and unexpected weight change. HENT: Negative. Negative for hearing loss. Respiratory: Negative. Cardiovascular: Negative for leg swelling. Gastrointestinal: Negative for constipation and diarrhea. Genitourinary: Negative. Musculoskeletal: Positive for arthralgias and back pain. Negative for gait problem, joint swelling and neck pain. Skin: Negative for rash. Neurological: Negative for dizziness, weakness, numbness and headaches. Psychiatric/Behavioral: Negative. Negative for sleep disturbance. Objective:     Vitals:  Temp 97.1 °F (36.2 °C)   Ht 5' 7\" (1.702 m)   Wt 185 lb (83.9 kg)   BMI 28.98 kg/m² Pain Score:   7    Physical Exam  Vitals reviewed. Constitutional:       Appearance: She is well-developed. HENT:      Head: Normocephalic. Nose: Nose normal.   Pulmonary:      Effort: Pulmonary effort is normal.   Musculoskeletal:      Cervical back: Normal range of motion and neck supple. Lumbar back: Tenderness present. Decreased range of motion. Negative right straight leg raise test and negative left straight leg raise test.      Right hip: Decreased range of motion. Left hip: Decreased range of motion. Decreased strength. Comments: ROM causes groin pain bilaterally, left hip strength 3/5   Lymphadenopathy:      Cervical: No cervical adenopathy. Skin:     General: Skin is warm and dry. Findings: No erythema or rash. Neurological:      Mental Status: She is alert and oriented to person, place, and time. Cranial Nerves: No cranial nerve deficit. Motor: Weakness present. Gait: Gait abnormal.      Deep Tendon Reflexes: Reflexes are normal and symmetric. Reflexes normal.      Comments: Slow to get up from chair  Walks slightly flexed with limp   Psychiatric:         Mood and Affect: Mood normal.         Behavior: Behavior normal.         Thought Content: Thought content normal.         Judgment: Judgment normal.            Assessment:        Diagnosis Orders   1. Bilateral sacroiliitis [facility] [place] [facility])  Ambulatory referral to Physical Therapy   2.  Bilateral hip pain  Ambulatory referral to Physical Therapy    XR HIP BILATERAL W AP PELVIS (2 VIEWS)   3. Anterolisthesis of lumbar spine  Ambulatory referral to Physical Therapy    XR LUMBAR SPINE (MIN 4 VIEWS)       Plan:          No orders of the defined types were placed in this encounter. Orders Placed This Encounter   Procedures    XR HIP BILATERAL W AP PELVIS (2 VIEWS)     Standing Status:   Future     Standing Expiration Date:   10/11/2022     Order Specific Question:   Reason for exam:     Answer:   eval for oa    XR LUMBAR SPINE (MIN 4 VIEWS)     Standing Status:   Future     Standing Expiration Date:   10/11/2022     Scheduling Instructions:      XR LUMBAR AP LAT FLEX EXT     Order Specific Question:   Reason for exam:     Answer:   eval for facet and instability    Ambulatory referral to Physical Therapy     Referral Priority:   Routine     Referral Type:   Physical Medicine     Requested Specialty:   Physical Therapist     Number of Visits Requested:   1     -Order bilateral hip x-ray to evaluate for OA  -Lumbar spine x-ray to evaluate for facet arthropathy and instability due to anterolisthesis  -External referral to physical therapy close to [facility], she is unable to drive and this is more convenient.  -We will call her with results and order appropriate injection and treatment. She performs home exercise daily for years. Follow up:  Return in about 4 weeks (around 8/10/2022) for review meds and reassess pain.     Chemo Guy, APRN - CNP

## 2022-07-14 DIAGNOSIS — M25.552 BILATERAL HIP PAIN: ICD-10-CM

## 2022-07-14 DIAGNOSIS — M25.551 BILATERAL HIP PAIN: ICD-10-CM

## 2022-07-14 DIAGNOSIS — M43.16 ANTEROLISTHESIS OF LUMBAR SPINE: ICD-10-CM

## 2022-07-29 ENCOUNTER — OFFICE VISIT (OUTPATIENT)
Dept: CARDIOLOGY CLINIC | Age: 52
End: 2022-07-29
Payer: MEDICARE

## 2022-07-29 VITALS
WEIGHT: 184 LBS | HEART RATE: 114 BPM | BODY MASS INDEX: 28.82 KG/M2 | DIASTOLIC BLOOD PRESSURE: 80 MMHG | SYSTOLIC BLOOD PRESSURE: 120 MMHG

## 2022-07-29 DIAGNOSIS — Z87.898 HISTORY OF SYNCOPE: ICD-10-CM

## 2022-07-29 DIAGNOSIS — Z72.0 TOBACCO ABUSE: ICD-10-CM

## 2022-07-29 DIAGNOSIS — Z00.00 PE (PHYSICAL EXAM), ROUTINE: Primary | ICD-10-CM

## 2022-07-29 DIAGNOSIS — I10 PRIMARY HYPERTENSION: ICD-10-CM

## 2022-07-29 PROCEDURE — 99214 OFFICE O/P EST MOD 30 MIN: CPT | Performed by: INTERNAL MEDICINE

## 2022-07-29 PROCEDURE — 3017F COLORECTAL CA SCREEN DOC REV: CPT | Performed by: INTERNAL MEDICINE

## 2022-07-29 PROCEDURE — G8419 CALC BMI OUT NRM PARAM NOF/U: HCPCS | Performed by: INTERNAL MEDICINE

## 2022-07-29 PROCEDURE — 93000 ELECTROCARDIOGRAM COMPLETE: CPT | Performed by: INTERNAL MEDICINE

## 2022-07-29 PROCEDURE — G8427 DOCREV CUR MEDS BY ELIG CLIN: HCPCS | Performed by: INTERNAL MEDICINE

## 2022-07-29 PROCEDURE — 4004F PT TOBACCO SCREEN RCVD TLK: CPT | Performed by: INTERNAL MEDICINE

## 2022-07-29 RX ORDER — EAR PLUGS
EACH OTIC (EAR)
COMMUNITY
Start: 2022-07-20

## 2022-07-29 NOTE — PROGRESS NOTES
Chief Complaint   Patient presents with    Establish Cardiologist     DR Griselda Rajan    Hypertension       7-29-22: Patient presents for initial medical evaluation. Patient is followed on a regular basis by Dr. Yissel Oliver MD. patient with history of asthma as well as hypertension as well as tobacco use. Status post echo in 2018 that was normal.  S/p recurrent syncope and followed with Dr. Tania Braun at [facility]. Had negative workup, she failed a TTT, s/p negative nuclear stress test. TSH is normal. She was referred to neuro. States she does not have warning symptoms at this time. 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 EKG today with normal sinus rhythm. Pt denies chest pain, dyspnea, dyspnea on exertion, change in exercise capacity, fatigue,  nausea, vomiting, diarrhea, constipation, motor weakness, insomnia, weight loss, syncope, dizziness, lightheadedness, palpitations, PND, orthopnea, or claudication. Has lost weight lately, approximately 40 pounds. No previous history myocardial infarction, congestive heart failure or arrhythmia        Patient Active Problem List   Diagnosis    Asthma exacerbation, mild    Chronic lumbar pain    Depression    Tobacco abuse    Severe persistent asthma with acute exacerbation    Moderate persistent asthma    Primary hypertension    History of syncope       Past Surgical History:   Procedure Laterality Date    ANKLE FRACTURE SURGERY Left 10/9/2019    LEFT OPEN REDUCTION INTERNAL FIXATION MALLEOLUS FRACTURE performed by Lonnie Edward DO at [facility]     2012    OTHER SURGICAL HISTORY      3 VAGINAL BIRTHS    TUBAL LIGATION      ENDOMETRIOSIS       Social History     Socioeconomic History    Marital status:    Tobacco Use    Smoking status: Every Day     Packs/day: 0.30     Types: Cigarettes    Smokeless tobacco: Never   Vaping Use    Vaping Use: Former   Substance and Sexual Activity    Alcohol use:  Yes disorder. Respiratory ROS: no cough, shortness of breath, or wheezing  Cardiovascular ROS: no chest pain or dyspnea on exertion  Gastrointestinal ROS: no abdominal pain, change in bowel habits, or black or bloody stools  Genito-Urinary ROS: no dysuria, trouble voiding, or hematuria  Musculoskeletal ROS: negative  Neurological ROS: no TIA or stroke symptoms  Dermatological ROS: negative    VITALS:  Blood pressure 120/80, pulse (!) 114, weight 184 lb (83.5 kg). Body mass index is 28.82 kg/m². Physical Examination:  General appearance - alert, well appearing, and in no distress  Mental status - alert, oriented to person, place, and time  Neck - Neck is supple, no JVD or carotid bruits. No thyromegaly or adenopathy. Chest - clear to auscultation, no wheezes, rales or rhonchi, symmetric air entry  Heart - normal rate, regular rhythm, normal S1, S2, no murmurs, rubs, clicks or gallops  Abdomen - soft, nontender, nondistended, no masses or organomegaly  Neurological - alert, oriented, normal speech, no focal findings or movement disorder noted  Extremities - peripheral pulses normal, no pedal edema, no clubbing or cyanosis  Skin - normal coloration and turgor, no rashes, no suspicious skin lesions noted      EKG: normal sinus rhythm, nonspecific ST and T waves changes    Orders Placed This Encounter   Procedures    EKG 12 Lead       ASSESSMENT:     Diagnosis Orders   1. PE (physical exam), routine  EKG 12 Lead      2. Tobacco abuse        3. Primary hypertension        4. History of syncope              PLAN:     As always, aggressive risk factor modification is strongly recommended. We should adhere to the JNC VIII guidelines for HTN management and the NCEP ATP III guidelines for LDL-C management. Cardiac diet is always recommended with low fat, cholesterol, calories and sodium. Continue medications at current doses. Consider ILR if has recurrent syncope. Refer to EP for opinion on syncope. ?  Need for disability. Patient was advised and encouraged to check blood pressure at home or at a pharmacy, maintain a logbook, and also call us back if blood pressure are above the target ranges or if it is low. Patient clearly understands and agrees to the instructions. We will need to continue to monitor muscle and liver enzymes, BUN, CR, and electrolytes.     Smoking cessation was strongly recommended    Check EKG

## 2023-05-05 ENCOUNTER — TELEPHONE (OUTPATIENT)
Dept: PRIMARY CARE | Facility: CLINIC | Age: 53
End: 2023-05-05

## 2023-05-05 NOTE — TELEPHONE ENCOUNTER
PATIENT CALLED STATING SHE TESTED POSITIVE FOR COVID 4/29. SHE IS HAVING THE SAME SYMPTOMS - COUGH, SOB, CONGESTION. PATIENT STATED IT IS GETTING HARDER FOR HER TO BREATHE BUT SHE STATED THAT'S NORMAL BECAUSE SHE HAS ASTHMA. SHE IS JUST WANTING RECOMMENDATIONS ON WHAT TO DO.   PLEASE ADVISE   [phone]

## 2023-07-27 ENCOUNTER — PREP FOR PROCEDURE (OUTPATIENT)
Dept: CARDIOLOGY CLINIC | Age: 53
End: 2023-07-27

## 2023-07-27 ENCOUNTER — OFFICE VISIT (OUTPATIENT)
Dept: CARDIOLOGY CLINIC | Age: 53
End: 2023-07-27

## 2023-07-27 VITALS
HEART RATE: 108 BPM | DIASTOLIC BLOOD PRESSURE: 91 MMHG | SYSTOLIC BLOOD PRESSURE: 154 MMHG | WEIGHT: 166.6 LBS | OXYGEN SATURATION: 99 % | BODY MASS INDEX: 26.09 KG/M2

## 2023-07-27 DIAGNOSIS — Z87.898 HISTORY OF SYNCOPE: ICD-10-CM

## 2023-07-27 DIAGNOSIS — I10 PRIMARY HYPERTENSION: Primary | ICD-10-CM

## 2023-07-27 DIAGNOSIS — Z72.0 TOBACCO ABUSE: ICD-10-CM

## 2023-07-27 RX ORDER — TEMAZEPAM 15 MG/1
CAPSULE ORAL
COMMUNITY
Start: 2023-07-20

## 2023-07-27 RX ORDER — LANOLIN ALCOHOL/MO/W.PET/CERES
400 CREAM (GRAM) TOPICAL DAILY
COMMUNITY
Start: 2023-07-20

## 2023-07-27 RX ORDER — ROPINIROLE 2 MG/1
2 TABLET, FILM COATED ORAL NIGHTLY
COMMUNITY
Start: 2023-07-20

## 2023-07-27 RX ORDER — DOCUSATE SODIUM 100 MG/1
100 CAPSULE, LIQUID FILLED ORAL 2 TIMES DAILY
COMMUNITY
Start: 2023-07-20

## 2023-07-27 RX ORDER — BENZTROPINE MESYLATE 2 MG/1
2 TABLET ORAL 2 TIMES DAILY
COMMUNITY
Start: 2023-07-20

## 2023-07-27 RX ORDER — TOPIRAMATE 100 MG/1
100 TABLET, FILM COATED ORAL 2 TIMES DAILY
COMMUNITY
Start: 2023-07-20

## 2023-07-27 RX ORDER — LORAZEPAM 0.5 MG/1
0.5 TABLET ORAL
Status: CANCELLED | OUTPATIENT
Start: 2023-07-27 | End: 2023-07-28

## 2023-07-27 RX ORDER — FLUCONAZOLE 150 MG/1
TABLET ORAL
COMMUNITY
Start: 2022-05-23

## 2023-07-27 NOTE — PROGRESS NOTES
Chief Complaint   Patient presents with    1 Year Follow Up       7-29-22: Patient presents for initial medical evaluation. Patient is followed on a regular basis by Dr. Lori Tellez MD. patient with history of asthma as well as hypertension as well as tobacco use. Status post echo in 2018 that was normal.  S/p recurrent syncope and followed with Dr. Carlos Enrique Montoya at [facility]. Had negative workup, she failed a TTT, s/p negative nuclear stress test. TSH is normal. She was referred to neuro. States she does not have warning symptoms at this time. 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 EKG today with normal sinus rhythm. Pt denies chest pain, dyspnea, dyspnea on exertion, change in exercise capacity, fatigue,  nausea, vomiting, diarrhea, constipation, motor weakness, insomnia, weight loss, syncope, dizziness, lightheadedness, palpitations, PND, orthopnea, or claudication. Has lost weight lately, approximately 40 pounds. No previous history myocardial infarction, congestive heart failure or arrhythmia    7-27-23: awaiting TTT ordered by neuro. Did not have a chance to see Consuelo Phoenix yet. Hx of recurrent syncope and states it is worse. She had a recent episode with ringing/hot flash warning, she got pail. No jonathon syncope. she failed a TTT, s/p negative nuclear stress test. TSH is normal.  Ekg WITH nsr, NO ISCHEMIA.          Patient Active Problem List   Diagnosis    Asthma exacerbation, mild    Chronic lumbar pain    Depression    Tobacco abuse    Severe persistent asthma with acute exacerbation    Moderate persistent asthma    Primary hypertension    History of syncope       Past Surgical History:   Procedure Laterality Date    ANKLE FRACTURE SURGERY Left 10/9/2019    LEFT OPEN REDUCTION INTERNAL FIXATION MALLEOLUS FRACTURE performed by Mercy Butler DO at [address]     2012    OTHER SURGICAL HISTORY      3 VAGINAL BIRTHS    TUBAL LIGATION      ENDOMETRIOSIS

## 2023-07-28 ENCOUNTER — TELEPHONE (OUTPATIENT)
Dept: CARDIOLOGY CLINIC | Age: 53
End: 2023-07-28

## 2023-07-28 NOTE — TELEPHONE ENCOUNTER
----- Message from [facility] sent at 7/28/2023 12:39 PM EDT -----  Regarding: [facility] Medicaid is no longer In [address] [facility] [address]  proceed with Procedure  [facility] Medicaid is no longer In [address] [facility] [address]  proceed with Procedure

## 2023-08-01 PROBLEM — R55 SYNCOPE: Status: ACTIVE | Noted: 2023-08-01

## 2023-08-01 PROBLEM — J45.909 ASTHMA (HHS-HCC): Status: ACTIVE | Noted: 2023-08-01

## 2023-08-01 PROBLEM — R05.9 COUGH: Status: ACTIVE | Noted: 2023-08-01

## 2023-08-01 PROBLEM — I10 HTN (HYPERTENSION): Status: ACTIVE | Noted: 2023-08-01

## 2023-08-01 PROBLEM — K04.7 DENTAL INFECTION: Status: ACTIVE | Noted: 2023-08-01

## 2023-08-01 PROBLEM — T78.40XA ALLERGIES: Status: ACTIVE | Noted: 2023-08-01

## 2023-08-01 PROBLEM — G93.32 CHRONIC FATIGUE AND IMMUNE DYSFUNCTION SYNDROME (MULTI): Status: ACTIVE | Noted: 2023-08-01

## 2023-08-01 PROBLEM — R73.9 ELEVATED BLOOD SUGAR: Status: ACTIVE | Noted: 2023-08-01

## 2023-08-01 PROBLEM — G25.81 RESTLESS LEG: Status: ACTIVE | Noted: 2023-08-01

## 2023-08-01 PROBLEM — F98.8 ADD (ATTENTION DEFICIT DISORDER): Status: ACTIVE | Noted: 2023-08-01

## 2023-08-01 PROBLEM — I95.1 AUTONOMIC ORTHOSTATIC HYPOTENSION: Status: ACTIVE | Noted: 2023-08-01

## 2023-08-01 PROBLEM — E78.5 HYPERLIPIDEMIA: Status: ACTIVE | Noted: 2023-08-01

## 2023-08-01 PROBLEM — G47.00 INSOMNIA: Status: ACTIVE | Noted: 2023-08-01

## 2023-08-01 PROBLEM — G43.909 MIGRAINE: Status: ACTIVE | Noted: 2023-08-01

## 2023-08-01 PROBLEM — F41.9 ANXIETY DISORDER: Status: ACTIVE | Noted: 2023-08-01

## 2023-08-01 PROBLEM — R93.89 ABNORMAL CT OF THE CHEST: Status: ACTIVE | Noted: 2023-08-01

## 2023-08-01 PROBLEM — R06.00 DYSPNEA: Status: ACTIVE | Noted: 2023-08-01

## 2023-08-01 PROBLEM — B37.9 YEAST INFECTION: Status: ACTIVE | Noted: 2023-08-01

## 2023-08-01 PROBLEM — E66.9 OBESE: Status: ACTIVE | Noted: 2023-08-01

## 2023-08-01 PROBLEM — N64.4 BREAST PAIN: Status: ACTIVE | Noted: 2023-08-01

## 2023-08-01 PROBLEM — I95.9 HYPOTENSION: Status: ACTIVE | Noted: 2023-08-01

## 2023-08-01 PROBLEM — R07.89 CHEST PAIN, NON-CARDIAC: Status: ACTIVE | Noted: 2023-08-01

## 2023-08-01 PROBLEM — D89.89 CHRONIC FATIGUE AND IMMUNE DYSFUNCTION SYNDROME (MULTI): Status: ACTIVE | Noted: 2023-08-01

## 2023-08-01 PROBLEM — F17.200 CURRENT SMOKER: Status: ACTIVE | Noted: 2023-08-01

## 2023-08-01 PROBLEM — K80.20 GALLSTONES: Status: ACTIVE | Noted: 2023-08-01

## 2023-08-01 PROBLEM — R55 SYNCOPE, NEAR: Status: ACTIVE | Noted: 2023-08-01

## 2023-08-01 PROBLEM — M54.9 BACK PAIN: Status: ACTIVE | Noted: 2023-08-01

## 2023-08-01 RX ORDER — IBUPROFEN 800 MG/1
1 TABLET ORAL EVERY 6 HOURS PRN
COMMUNITY
Start: 2021-02-01

## 2023-08-01 RX ORDER — FREMANEZUMAB-VFRM 225 MG/1.5ML
INJECTION SUBCUTANEOUS
COMMUNITY
End: 2023-10-23 | Stop reason: SDUPTHER

## 2023-08-01 RX ORDER — BUSPIRONE HYDROCHLORIDE 10 MG/1
TABLET ORAL
COMMUNITY

## 2023-08-01 RX ORDER — SUMATRIPTAN SUCCINATE 100 MG/1
TABLET ORAL
COMMUNITY
Start: 2022-10-14

## 2023-08-01 RX ORDER — METOPROLOL TARTRATE 25 MG/1
25 TABLET, FILM COATED ORAL 2 TIMES DAILY
COMMUNITY
End: 2023-10-23 | Stop reason: ALTCHOICE

## 2023-08-01 RX ORDER — IBUPROFEN 200 MG
CAPSULE ORAL
COMMUNITY
Start: 2021-10-15 | End: 2024-01-19 | Stop reason: SDUPTHER

## 2023-08-01 RX ORDER — LOSARTAN POTASSIUM 25 MG/1
1 TABLET ORAL DAILY
COMMUNITY
Start: 2022-08-22 | End: 2023-10-23 | Stop reason: ALTCHOICE

## 2023-08-01 RX ORDER — ACETAMINOPHEN, DIPHENHYDRAMINE HCL, PHENYLEPHRINE HCL 325; 25; 5 MG/1; MG/1; MG/1
TABLET ORAL
COMMUNITY
End: 2023-10-23 | Stop reason: ALTCHOICE

## 2023-08-01 RX ORDER — LANOLIN ALCOHOL/MO/W.PET/CERES
1 CREAM (GRAM) TOPICAL DAILY
COMMUNITY
Start: 2023-04-12

## 2023-08-01 RX ORDER — IPRATROPIUM BROMIDE AND ALBUTEROL SULFATE 2.5; .5 MG/3ML; MG/3ML
SOLUTION RESPIRATORY (INHALATION)
COMMUNITY
Start: 2021-09-20 | End: 2024-01-19 | Stop reason: SDUPTHER

## 2023-08-01 RX ORDER — ALBUTEROL SULFATE 0.83 MG/ML
SOLUTION RESPIRATORY (INHALATION)
COMMUNITY
Start: 2021-09-20 | End: 2024-01-19 | Stop reason: SDUPTHER

## 2023-08-01 RX ORDER — MONTELUKAST SODIUM 10 MG/1
1 TABLET ORAL NIGHTLY
COMMUNITY
Start: 2021-08-26 | End: 2023-10-23 | Stop reason: ALTCHOICE

## 2023-08-01 RX ORDER — ZOLPIDEM TARTRATE 10 MG/1
5-10 TABLET ORAL NIGHTLY PRN
COMMUNITY
Start: 2022-12-22 | End: 2023-10-23 | Stop reason: ALTCHOICE

## 2023-08-01 RX ORDER — BUSPIRONE HYDROCHLORIDE 5 MG/1
TABLET ORAL
COMMUNITY
End: 2023-10-23 | Stop reason: ALTCHOICE

## 2023-08-01 RX ORDER — FLUCONAZOLE 150 MG/1
1 TABLET ORAL DAILY
COMMUNITY
Start: 2022-05-23

## 2023-08-01 RX ORDER — ALBUTEROL SULFATE 90 UG/1
AEROSOL, METERED RESPIRATORY (INHALATION)
COMMUNITY
Start: 2020-03-23 | End: 2023-10-23 | Stop reason: SDUPTHER

## 2023-08-01 RX ORDER — EPINEPHRINE 0.3 MG/.3ML
INJECTION SUBCUTANEOUS
COMMUNITY
Start: 2022-11-13 | End: 2024-01-19 | Stop reason: SDUPTHER

## 2023-08-01 RX ORDER — ALPRAZOLAM 1 MG/1
1 TABLET ORAL 3 TIMES DAILY
COMMUNITY

## 2023-08-01 RX ORDER — CETIRIZINE HYDROCHLORIDE 10 MG/1
10 TABLET ORAL DAILY
COMMUNITY
End: 2023-10-23 | Stop reason: SDUPTHER

## 2023-08-01 RX ORDER — DULOXETIN HYDROCHLORIDE 60 MG/1
120 CAPSULE, DELAYED RELEASE ORAL DAILY
COMMUNITY

## 2023-08-01 RX ORDER — FLUTICASONE FUROATE AND VILANTEROL 100; 25 UG/1; UG/1
POWDER RESPIRATORY (INHALATION)
COMMUNITY
End: 2023-10-23 | Stop reason: ALTCHOICE

## 2023-08-01 RX ORDER — SUMATRIPTAN 50 MG/1
TABLET, FILM COATED ORAL
COMMUNITY
Start: 2021-10-15 | End: 2023-10-23 | Stop reason: ALTCHOICE

## 2023-08-01 RX ORDER — DOXEPIN HYDROCHLORIDE 25 MG/1
CAPSULE ORAL
COMMUNITY
Start: 2023-03-12 | End: 2023-10-23 | Stop reason: ALTCHOICE

## 2023-08-01 RX ORDER — TOPIRAMATE 100 MG/1
100 TABLET, FILM COATED ORAL 2 TIMES DAILY
COMMUNITY

## 2023-08-01 RX ORDER — ROPINIROLE 2 MG/1
2 TABLET, FILM COATED ORAL NIGHTLY
COMMUNITY

## 2023-08-01 RX ORDER — TOPIRAMATE 50 MG/1
TABLET, FILM COATED ORAL
COMMUNITY
Start: 2022-03-28 | End: 2023-10-23 | Stop reason: ALTCHOICE

## 2023-08-01 RX ORDER — ESZOPICLONE 3 MG/1
3 TABLET, FILM COATED ORAL NIGHTLY PRN
COMMUNITY
Start: 2023-04-20 | End: 2023-10-23 | Stop reason: ALTCHOICE

## 2023-08-01 RX ORDER — DULOXETIN HYDROCHLORIDE 30 MG/1
1 CAPSULE, DELAYED RELEASE ORAL DAILY
COMMUNITY
Start: 2022-02-21 | End: 2023-10-23 | Stop reason: ALTCHOICE

## 2023-08-01 RX ORDER — BENZTROPINE MESYLATE 2 MG/1
2 TABLET ORAL 2 TIMES DAILY
COMMUNITY
Start: 2023-04-12

## 2023-08-01 RX ORDER — MAGNESIUM 200 MG
1 TABLET ORAL DAILY
COMMUNITY
Start: 2023-04-12

## 2023-08-01 RX ORDER — METHYLPHENIDATE HYDROCHLORIDE 20 MG/1
TABLET ORAL
COMMUNITY

## 2023-08-01 RX ORDER — CITALOPRAM 20 MG/1
1 TABLET, FILM COATED ORAL DAILY
COMMUNITY
End: 2023-10-23 | Stop reason: ALTCHOICE

## 2023-08-01 RX ORDER — HYDROXYZINE PAMOATE 50 MG/1
CAPSULE ORAL 4 TIMES DAILY
COMMUNITY
End: 2023-10-23 | Stop reason: ALTCHOICE

## 2023-08-01 RX ORDER — BUDESONIDE AND FORMOTEROL FUMARATE DIHYDRATE 160; 4.5 UG/1; UG/1
2 AEROSOL RESPIRATORY (INHALATION) 2 TIMES DAILY
COMMUNITY
End: 2023-10-23 | Stop reason: ALTCHOICE

## 2023-08-01 RX ORDER — CALCIUM CARBONATE 200(500)MG
TABLET,CHEWABLE ORAL
COMMUNITY
Start: 2021-11-03

## 2023-08-01 RX ORDER — DOCUSATE SODIUM 100 MG/1
CAPSULE, LIQUID FILLED ORAL
COMMUNITY
Start: 2023-04-12

## 2023-08-01 NOTE — TELEPHONE ENCOUNTER
LMOM TCB  PATIENT WILL NEED TO CALL INSURANCE TO SEE IF A CARDIOLOGIST FROM [facility] OR [facility] IS COVERED,AND THEN WE CAN FAX A REFERRAL TO WHICH FACILITY SHE CHOOSES.

## 2023-08-01 NOTE — TELEPHONE ENCOUNTER
Not sure where she can go.   Please refer her to the appropriate specialist at [facility] or [facility] or [facility]

## 2023-08-03 ENCOUNTER — APPOINTMENT (OUTPATIENT)
Dept: PRIMARY CARE | Facility: CLINIC | Age: 53
End: 2023-08-03

## 2023-10-13 ENCOUNTER — TELEPHONE (OUTPATIENT)
Dept: PRIMARY CARE | Facility: CLINIC | Age: 53
End: 2023-10-13
Payer: COMMERCIAL

## 2023-10-13 DIAGNOSIS — L29.9 ITCHING: ICD-10-CM

## 2023-10-13 DIAGNOSIS — B88.0 CHIGGERS (MITES): Primary | ICD-10-CM

## 2023-10-13 RX ORDER — ZALEPLON 10 MG/1
10 CAPSULE ORAL NIGHTLY
COMMUNITY
Start: 2023-08-09

## 2023-10-13 RX ORDER — TRAZODONE HYDROCHLORIDE 50 MG/1
TABLET ORAL
COMMUNITY
Start: 2023-06-26 | End: 2023-10-23 | Stop reason: ALTCHOICE

## 2023-10-13 RX ORDER — TEMAZEPAM 15 MG/1
15 CAPSULE ORAL NIGHTLY
COMMUNITY
Start: 2023-07-20 | End: 2023-10-23 | Stop reason: ALTCHOICE

## 2023-10-13 NOTE — TELEPHONE ENCOUNTER
Patient called in stating she may have gotten mites from her service dog. She stated she has starting itching and has started developing small red bumps. She is requesting for something to be called in for her to treat this  [facility] [address]  Please Advise  
Patent

## 2023-10-19 ENCOUNTER — OFFICE VISIT (OUTPATIENT)
Dept: PAIN MANAGEMENT | Age: 53
End: 2023-10-19
Payer: COMMERCIAL

## 2023-10-19 VITALS
BODY MASS INDEX: 26.68 KG/M2 | WEIGHT: 166 LBS | HEIGHT: 66 IN | DIASTOLIC BLOOD PRESSURE: 62 MMHG | SYSTOLIC BLOOD PRESSURE: 126 MMHG

## 2023-10-19 DIAGNOSIS — M47.817 LUMBOSACRAL SPONDYLOSIS WITHOUT MYELOPATHY: Primary | ICD-10-CM

## 2023-10-19 PROCEDURE — G8427 DOCREV CUR MEDS BY ELIG CLIN: HCPCS | Performed by: PAIN MEDICINE

## 2023-10-19 PROCEDURE — 99204 OFFICE O/P NEW MOD 45 MIN: CPT | Performed by: PAIN MEDICINE

## 2023-10-19 PROCEDURE — G8484 FLU IMMUNIZE NO ADMIN: HCPCS | Performed by: PAIN MEDICINE

## 2023-10-19 PROCEDURE — 3017F COLORECTAL CA SCREEN DOC REV: CPT | Performed by: PAIN MEDICINE

## 2023-10-19 PROCEDURE — G8419 CALC BMI OUT NRM PARAM NOF/U: HCPCS | Performed by: PAIN MEDICINE

## 2023-10-19 PROCEDURE — 3074F SYST BP LT 130 MM HG: CPT | Performed by: PAIN MEDICINE

## 2023-10-19 PROCEDURE — 3078F DIAST BP <80 MM HG: CPT | Performed by: PAIN MEDICINE

## 2023-10-19 PROCEDURE — 4004F PT TOBACCO SCREEN RCVD TLK: CPT | Performed by: PAIN MEDICINE

## 2023-10-19 RX ORDER — HYDROXYZINE HYDROCHLORIDE 25 MG/1
25 TABLET, FILM COATED ORAL 3 TIMES DAILY
Qty: 90 TABLET | Refills: 0 | Status: SHIPPED | OUTPATIENT
Start: 2023-10-19 | End: 2023-11-18

## 2023-10-19 RX ORDER — MOMETASONE FUROATE 1 MG/ML
SOLUTION TOPICAL DAILY
Qty: 30 ML | Refills: 1 | Status: SHIPPED | OUTPATIENT
Start: 2023-10-19 | End: 2024-10-18

## 2023-10-19 NOTE — PROGRESS NOTES
oxide (MAG-OX) 400 (240 Mg) MG tablet Take 1 tablet by mouth daily      topiramate (TOPAMAX) 100 MG tablet Take 1 tablet by mouth 2 times daily      fluconazole (DIFLUCAN) 150 MG tablet Take by mouth      PREDNISONE PO Take by mouth      Cyanocobalamin (VITAMIN B-12) 1000 MCG/15ML LIQD 1 tablet under the tongue and allow to dissolve      SUMAtriptan Succinate (IMITREX PO) Take 100 mg by mouth as needed      Fremanezumab-vfrm (AJOVY SC) Inject into the skin      busPIRone (BUSPAR) 10 MG tablet Take 2 tablets by mouth 3 times daily      cetirizine (ZYRTEC) 10 MG tablet Take by mouth daily       DULoxetine (CYMBALTA) 60 MG extended release capsule Take 2 capsules by mouth daily      methylphenidate (RITALIN) 20 MG tablet Take by mouth 3 times daily. metoprolol tartrate (LOPRESSOR) 25 MG tablet Take 0.5 tablets by mouth 2 times daily      ibuprofen (ADVIL;MOTRIN) 800 MG tablet Take by mouth      Nebulizers (COMPRESSOR/NEBULIZER) MISC Dispense portable nebulizer with supplies 1 each 0    albuterol sulfate  (90 Base) MCG/ACT inhaler Inhale 2 puffs into the lungs every 6 hours as needed for Wheezing 1 Inhaler 5    ipratropium-albuterol (DUONEB) 0.5-2.5 (3) MG/3ML SOLN nebulizer solution Inhale 3 mLs into the lungs every 6 hours 360 mL 5    albuterol (PROVENTIL) (2.5 MG/3ML) 0.083% nebulizer solution Take 3 mLs by nebulization every 6 hours as needed for Wheezing 120 each 3    temazepam (RESTORIL) 15 MG capsule       Topiramate (TOPAMAX PO) Take by mouth every 12 hours      montelukast (SINGULAIR) 10 MG tablet Take by mouth nightly  (Patient not taking: Reported on 7/27/2023)      ALPRAZolam (XANAX) 0.5 MG tablet Take 2 tablets by mouth 3 times daily as needed for Sleep or Anxiety. And 1 mg at HS      acetaminophen (TYLENOL) 325 MG tablet Take 500 mg by mouth as needed        No current facility-administered medications on file prior to visit.            HPI    Review of Systems   All other systems reviewed and

## 2023-10-20 ENCOUNTER — TELEPHONE (OUTPATIENT)
Dept: PAIN MANAGEMENT | Age: 53
End: 2023-10-20

## 2023-10-20 NOTE — TELEPHONE ENCOUNTER
Patient states that she has done Pt in the past .            Please Advise Patient   we will need those records or a medical release to obtain them, to submit Auth . The Patient's  insurance requires 4 to 6 weeks of Pt before the Auth can be submitted.

## 2023-10-23 ENCOUNTER — TELEMEDICINE (OUTPATIENT)
Dept: PRIMARY CARE | Facility: CLINIC | Age: 53
End: 2023-10-23
Payer: COMMERCIAL

## 2023-10-23 DIAGNOSIS — H10.403 CHRONIC CONJUNCTIVITIS OF BOTH EYES, UNSPECIFIED CHRONIC CONJUNCTIVITIS TYPE: ICD-10-CM

## 2023-10-23 DIAGNOSIS — T78.40XS ALLERGY, SEQUELA: ICD-10-CM

## 2023-10-23 DIAGNOSIS — Z12.11 COLON CANCER SCREENING: ICD-10-CM

## 2023-10-23 DIAGNOSIS — M25.50 ARTHRALGIA, UNSPECIFIED JOINT: ICD-10-CM

## 2023-10-23 DIAGNOSIS — F41.1 GENERALIZED ANXIETY DISORDER: ICD-10-CM

## 2023-10-23 DIAGNOSIS — N39.3 SUI (STRESS URINARY INCONTINENCE, FEMALE): ICD-10-CM

## 2023-10-23 DIAGNOSIS — F98.8 ATTENTION DEFICIT DISORDER, UNSPECIFIED HYPERACTIVITY PRESENCE: ICD-10-CM

## 2023-10-23 DIAGNOSIS — F17.200 CURRENT SMOKER: ICD-10-CM

## 2023-10-23 DIAGNOSIS — R53.82 CHRONIC FATIGUE: ICD-10-CM

## 2023-10-23 DIAGNOSIS — I10 PRIMARY HYPERTENSION: Primary | ICD-10-CM

## 2023-10-23 DIAGNOSIS — Z12.31 BREAST CANCER SCREENING BY MAMMOGRAM: ICD-10-CM

## 2023-10-23 DIAGNOSIS — G43.809 OTHER MIGRAINE WITHOUT STATUS MIGRAINOSUS, NOT INTRACTABLE: ICD-10-CM

## 2023-10-23 PROCEDURE — 99215 OFFICE O/P EST HI 40 MIN: CPT | Performed by: FAMILY MEDICINE

## 2023-10-23 RX ORDER — TOLTERODINE 4 MG/1
4 CAPSULE, EXTENDED RELEASE ORAL DAILY
Qty: 30 CAPSULE | Refills: 5 | Status: SHIPPED | OUTPATIENT
Start: 2023-10-23 | End: 2024-04-20

## 2023-10-23 RX ORDER — METOPROLOL TARTRATE 25 MG/1
25 TABLET, FILM COATED ORAL 2 TIMES DAILY
Qty: 180 TABLET | Refills: 3 | Status: SHIPPED | OUTPATIENT
Start: 2023-10-23 | End: 2024-01-19 | Stop reason: SDUPTHER

## 2023-10-23 RX ORDER — SUMATRIPTAN SUCCINATE 4 MG/.5ML
4 INJECTION, SOLUTION SUBCUTANEOUS AS NEEDED
Qty: 2 ML | Refills: 3 | Status: SHIPPED | OUTPATIENT
Start: 2023-10-23

## 2023-10-23 RX ORDER — CIPROFLOXACIN HYDROCHLORIDE 3 MG/ML
1 SOLUTION/ DROPS OPHTHALMIC 4 TIMES DAILY
Qty: 2 ML | Refills: 0 | Status: SHIPPED | OUTPATIENT
Start: 2023-10-23 | End: 2023-11-02

## 2023-10-23 RX ORDER — CETIRIZINE HYDROCHLORIDE 10 MG/1
10 TABLET ORAL DAILY
Qty: 90 TABLET | Refills: 3 | Status: SHIPPED | OUTPATIENT
Start: 2023-10-23 | End: 2024-01-19 | Stop reason: SDUPTHER

## 2023-10-23 RX ORDER — FREMANEZUMAB-VFRM 225 MG/1.5ML
INJECTION SUBCUTANEOUS
Qty: 3 EACH | Refills: 3 | Status: SHIPPED | OUTPATIENT
Start: 2023-10-23

## 2023-10-23 RX ORDER — ALBUTEROL SULFATE 90 UG/1
2 AEROSOL, METERED RESPIRATORY (INHALATION) EVERY 6 HOURS PRN
Qty: 18 G | Refills: 3 | Status: SHIPPED | OUTPATIENT
Start: 2023-10-23 | End: 2024-01-19 | Stop reason: SDUPTHER

## 2023-10-23 ASSESSMENT — ENCOUNTER SYMPTOMS
HEADACHES: 0
COUGH: 0
ABDOMINAL PAIN: 0
VOMITING: 0
HEMATURIA: 0
NUMBNESS: 0
DIFFICULTY URINATING: 0
DIZZINESS: 0
ADENOPATHY: 0
NAUSEA: 0
NECK PAIN: 0
BACK PAIN: 0
DYSPHORIC MOOD: 0
DIARRHEA: 0
MYALGIAS: 0
CHEST TIGHTNESS: 0
FATIGUE: 0
EYE DISCHARGE: 0
BRUISES/BLEEDS EASILY: 0
NERVOUS/ANXIOUS: 0
WEAKNESS: 0
BLOOD IN STOOL: 0
ACTIVITY CHANGE: 0
SHORTNESS OF BREATH: 0
ARTHRALGIAS: 0
SORE THROAT: 0
CONSTIPATION: 0

## 2023-10-23 NOTE — PROGRESS NOTES
Subjective   Patient ID: Yasmin Rodriguez is a 53 y.o. female who presents for Blister (Left eye).  HPI  Patient here for telephone visit    Patient here with multiple issues today    Eye blister been there a few weeks   Tried warm compresses they have not help and try to keep it clean   Some effect on vision  Some drainage from each eye  Doesn't think its a stye  Left now right    Hypertension reported stable  Tolerates medicine  Would like to continue    Anxiety ongoing  Multiple issues  No suicidal ideation no psychotic or manic symptoms  Consider counseling    Attention deficit ongoing  Discussed treatment options    Positive tobacco abuse  Stop smoking    Stress urinary incontinence  Stress testing or medicine she would like to try medication    Due for colon cancer screening and breast cancer screening    Migraine ongoing  Would like to adjust migraine medicines  Has been using injectable preventatives would like to also try injectable Imitrex for breakthrough    Positive allergies  Medicine helpful  Discussed treatment options    Arthralgia  Multiple joints  Needs further rheumatology evaluation    Positive fatigue  Chronic in nature  Multifactorial          Review of Systems   Constitutional:  Negative for activity change and fatigue.   HENT:  Negative for congestion and sore throat.    Eyes:  Negative for discharge.   Respiratory:  Negative for cough, chest tightness and shortness of breath.    Cardiovascular:  Negative for chest pain and leg swelling.   Gastrointestinal:  Negative for abdominal pain, blood in stool, constipation, diarrhea, nausea and vomiting.   Endocrine: Negative for cold intolerance and heat intolerance.   Genitourinary:  Negative for difficulty urinating and hematuria.   Musculoskeletal:  Negative for arthralgias, back pain, gait problem, myalgias and neck pain.   Allergic/Immunologic: Negative for environmental allergies.   Neurological:  Negative for dizziness, syncope,  weakness, numbness and headaches.   Hematological:  Negative for adenopathy. Does not bruise/bleed easily.   Psychiatric/Behavioral:  Negative for dysphoric mood. The patient is not nervous/anxious.    All other systems reviewed and are negative.      Objective   There were no vitals taken for this visit.   Physical Exam  phone  Assessment/Plan   Problem List Items Addressed This Visit       ADD (attention deficit disorder)    Allergies    Relevant Medications    cetirizine (ZyrTEC) 10 mg tablet    albuterol 90 mcg/actuation inhaler    Anxiety disorder    Primary hypertension - Primary    Relevant Medications    metoprolol tartrate (Lopressor) 25 mg tablet    Current smoker    Migraine    Relevant Medications    Ajovy Autoinjector 225 mg/1.5 mL auto-injector    SUMAtriptan succinate (Imitrex STATdose Pen) 4 mg/0.5 mL pen injector     Other Visit Diagnoses       Chronic conjunctivitis of both eyes, unspecified chronic conjunctivitis type        Relevant Medications    ciprofloxacin (Ciloxan) 0.3 % ophthalmic solution    SAHARA (stress urinary incontinence, female)        Relevant Medications    tolterodine LA (Detrol LA) 4 mg 24 hr capsule    Colon cancer screening        Relevant Orders    Colonoscopy Screening; Average Risk Patient    Breast cancer screening by mammogram        Relevant Orders    BI mammo bilateral screening tomosynthesis    Arthralgia, unspecified joint        Relevant Orders    Referral to Rheumatology    Chronic fatigue                Patient education provided.  Stay current with age appropriate health maintenance as instructed.  Appointment here or ER with new or worsening symptoms'  Keep appropriate follow-up visit.  Stay current with proper immunizations   Discussed at length  Provide multiple refills   discussed with patient at length her refills and medications  Patient should have a eye doctor evaluation we will give her Cipro drops while awaiting that evaluation  ER with worsening  symptoms  Discussed risk of vision threatening issues with patient  Suggest follow-up visit in office  Report suicidal ideation report psychotic or manic symptoms provide multiple

## 2023-12-31 ENCOUNTER — HOSPITAL ENCOUNTER (EMERGENCY)
Age: 53
Discharge: HOME | End: 2023-12-31
Attending: EMERGENCY MEDICINE
Payer: COMMERCIAL

## 2023-12-31 VITALS
OXYGEN SATURATION: 98 % | BODY MASS INDEX: 25.11 KG/M2 | TEMPERATURE: 98.3 F | SYSTOLIC BLOOD PRESSURE: 151 MMHG | DIASTOLIC BLOOD PRESSURE: 90 MMHG | HEART RATE: 62 BPM | WEIGHT: 160 LBS | RESPIRATION RATE: 18 BRPM | HEIGHT: 67 IN

## 2023-12-31 DIAGNOSIS — I10 HYPERTENSION, UNSPECIFIED TYPE: Primary | ICD-10-CM

## 2023-12-31 LAB
ANION GAP SERPL CALCULATED.3IONS-SCNC: 14 MEQ/L (ref 9–15)
BASOPHILS # BLD: 0 K/UL (ref 0–0.1)
BASOPHILS NFR BLD: 0.4 % (ref 0.1–1.2)
BUN SERPL-MCNC: 17 MG/DL (ref 6–20)
CALCIUM SERPL-MCNC: 9 MG/DL (ref 8.5–9.9)
CHLORIDE SERPL-SCNC: 107 MEQ/L (ref 95–107)
CO2 SERPL-SCNC: 20 MEQ/L (ref 20–31)
CREAT SERPL-MCNC: 0.69 MG/DL (ref 0.5–0.9)
EOSINOPHIL # BLD: 0.2 K/UL (ref 0–0.4)
EOSINOPHIL NFR BLD: 2.2 % (ref 0.7–5.8)
ERYTHROCYTE [DISTWIDTH] IN BLOOD BY AUTOMATED COUNT: 13.5 % (ref 11.7–14.4)
GLUCOSE SERPL-MCNC: 108 MG/DL (ref 70–99)
HCT VFR BLD AUTO: 47.6 % (ref 37–47)
HGB BLD-MCNC: 15.8 G/DL (ref 11.2–15.7)
IMM GRANULOCYTES # BLD: 0 K/UL
IMM GRANULOCYTES NFR BLD: 0.3 %
LYMPHOCYTES # BLD: 4.2 K/UL (ref 1.2–3.7)
LYMPHOCYTES NFR BLD: 37.7 %
MCH RBC QN AUTO: 30.4 PG (ref 25.6–32.2)
MCHC RBC AUTO-ENTMCNC: 33.2 % (ref 32.2–35.5)
MCV RBC AUTO: 91.7 FL (ref 79.4–94.8)
MONOCYTES # BLD: 0.6 K/UL (ref 0.2–0.9)
MONOCYTES NFR BLD: 5.6 % (ref 4.7–12.5)
NEUTROPHILS # BLD: 6 K/UL (ref 1.6–6.1)
NEUTS SEG NFR BLD: 53.8 % (ref 34–71.1)
PLATELET # BLD AUTO: 353 K/UL (ref 182–369)
POTASSIUM SERPL-SCNC: 3.7 MEQ/L (ref 3.4–4.9)
RBC # BLD AUTO: 5.19 M/UL (ref 3.93–5.22)
SODIUM SERPL-SCNC: 141 MEQ/L (ref 135–144)
WBC # BLD AUTO: 11.1 K/UL (ref 4–10)

## 2023-12-31 PROCEDURE — 85025 COMPLETE CBC W/AUTO DIFF WBC: CPT

## 2023-12-31 PROCEDURE — 36415 COLL VENOUS BLD VENIPUNCTURE: CPT

## 2023-12-31 PROCEDURE — 6370000000 HC RX 637 (ALT 250 FOR IP): Performed by: EMERGENCY MEDICINE

## 2023-12-31 PROCEDURE — 80048 BASIC METABOLIC PNL TOTAL CA: CPT

## 2023-12-31 PROCEDURE — 99284 EMERGENCY DEPT VISIT MOD MDM: CPT

## 2023-12-31 RX ORDER — METOPROLOL TARTRATE 50 MG/1
25 TABLET, FILM COATED ORAL ONCE
Status: COMPLETED | OUTPATIENT
Start: 2023-12-31 | End: 2023-12-31

## 2023-12-31 RX ADMIN — METOPROLOL TARTRATE 25 MG: 50 TABLET ORAL at 19:43

## 2023-12-31 ASSESSMENT — LIFESTYLE VARIABLES
HOW OFTEN DO YOU HAVE A DRINK CONTAINING ALCOHOL: NEVER
HOW MANY STANDARD DRINKS CONTAINING ALCOHOL DO YOU HAVE ON A TYPICAL DAY: PATIENT DOES NOT DRINK

## 2023-12-31 ASSESSMENT — PAIN - FUNCTIONAL ASSESSMENT: PAIN_FUNCTIONAL_ASSESSMENT: NONE - DENIES PAIN

## 2024-01-01 NOTE — ED PROVIDER NOTES
!! - Potential duplicate medications found. Please discuss with provider.          ALLERGIES     Latex, Pcn [penicillins], and Seroquel [quetiapine]    FAMILY HISTORY       Family History   Problem Relation Age of Onset    Arthritis Mother     Asthma Mother           SOCIAL HISTORY       Social History     Socioeconomic History    Marital status:      Spouse name: None    Number of children: None    Years of education: None    Highest education level: None   Tobacco Use    Smoking status: Every Day     Current packs/day: 0.30     Types: Cigarettes    Smokeless tobacco: Never   Vaping Use    Vaping Use: Former   Substance and Sexual Activity    Alcohol use: Not Currently     Comment: socially    Drug use: Yes     Types: Marijuana (Weed)     Comment: THC/CBD       SCREENINGS    Sylmar Coma Scale  Eye Opening: Spontaneous  Best Verbal Response: Oriented  Best Motor Response: Obeys commands  Humza Coma Scale Score: 15          PHYSICAL EXAM    (up to 7 for level 4, 8 or more for level 5)     ED Triage Vitals [12/31/23 1918]   BP Temp Temp Source Pulse Respirations SpO2 Height Weight - Scale   (!) 174/114 98.3 °F (36.8 °C) Oral 73 18 100 % 1.702 m (5' 7\") 72.6 kg (160 lb)       Physical Exam  Vitals and nursing note reviewed.   Constitutional:       General: She is not in acute distress.     Appearance: Normal appearance. She is well-developed. She is not ill-appearing.   HENT:      Head: Normocephalic and atraumatic.      Mouth/Throat:      Mouth: Mucous membranes are moist.      Pharynx: Oropharynx is clear.   Eyes:      Extraocular Movements: Extraocular movements intact.      Conjunctiva/sclera: Conjunctivae normal.   Cardiovascular:      Rate and Rhythm: Normal rate and regular rhythm.   Pulmonary:      Effort: Pulmonary effort is normal.      Breath sounds: Normal breath sounds.   Abdominal:      General: Bowel sounds are normal.      Palpations: Abdomen is soft.      Tenderness: There is no

## 2024-01-01 NOTE — ED TRIAGE NOTES
Pt presents to ER with c/o high blood pressure. Pt states she began feeling tightness in her neck and felt like she was starting to have one of her \"episodes\" that she has in the past. States she feels \"like I'm walking on a cloud.\"

## 2024-01-17 ASSESSMENT — ENCOUNTER SYMPTOMS
HEADACHES: 1
SWEATS: 0
SHORTNESS OF BREATH: 1
ORTHOPNEA: 0
PND: 0
HYPERTENSION: 1
BLURRED VISION: 0
NECK PAIN: 1
PALPITATIONS: 0

## 2024-01-19 ENCOUNTER — TELEPHONE (OUTPATIENT)
Dept: PRIMARY CARE | Facility: CLINIC | Age: 54
End: 2024-01-19

## 2024-01-19 ENCOUNTER — TELEMEDICINE (OUTPATIENT)
Dept: PRIMARY CARE | Facility: CLINIC | Age: 54
End: 2024-01-19
Payer: COMMERCIAL

## 2024-01-19 VITALS — SYSTOLIC BLOOD PRESSURE: 131 MMHG | OXYGEN SATURATION: 98 % | DIASTOLIC BLOOD PRESSURE: 98 MMHG | HEART RATE: 78 BPM

## 2024-01-19 DIAGNOSIS — G90.9 AUTONOMIC DYSFUNCTION: ICD-10-CM

## 2024-01-19 DIAGNOSIS — G93.32 CHRONIC FATIGUE AND IMMUNE DYSFUNCTION SYNDROME (MULTI): ICD-10-CM

## 2024-01-19 DIAGNOSIS — I10 PRIMARY HYPERTENSION: Primary | ICD-10-CM

## 2024-01-19 DIAGNOSIS — E78.2 MIXED HYPERLIPIDEMIA: ICD-10-CM

## 2024-01-19 DIAGNOSIS — R73.9 ELEVATED BLOOD SUGAR: ICD-10-CM

## 2024-01-19 DIAGNOSIS — T78.40XS ALLERGY, SEQUELA: ICD-10-CM

## 2024-01-19 DIAGNOSIS — F17.200 CURRENT SMOKER: ICD-10-CM

## 2024-01-19 DIAGNOSIS — D89.89 CHRONIC FATIGUE AND IMMUNE DYSFUNCTION SYNDROME (MULTI): ICD-10-CM

## 2024-01-19 DIAGNOSIS — F41.1 GENERALIZED ANXIETY DISORDER: ICD-10-CM

## 2024-01-19 PROCEDURE — 99443 PR PHYS/QHP TELEPHONE EVALUATION 21-30 MIN: CPT | Performed by: FAMILY MEDICINE

## 2024-01-19 RX ORDER — CETIRIZINE HYDROCHLORIDE 10 MG/1
10 TABLET ORAL DAILY
Qty: 90 TABLET | Refills: 3 | Status: SHIPPED | OUTPATIENT
Start: 2024-01-19 | End: 2025-01-18

## 2024-01-19 RX ORDER — EPINEPHRINE 0.3 MG/.3ML
INJECTION SUBCUTANEOUS
Qty: 1 EACH | Refills: 2 | Status: SHIPPED | OUTPATIENT
Start: 2024-01-19

## 2024-01-19 RX ORDER — ALBUTEROL SULFATE 90 UG/1
2 AEROSOL, METERED RESPIRATORY (INHALATION) EVERY 6 HOURS PRN
Qty: 18 G | Refills: 3 | Status: SHIPPED | OUTPATIENT
Start: 2024-01-19

## 2024-01-19 RX ORDER — IBUPROFEN 200 MG
CAPSULE ORAL
Qty: 100 EACH | Refills: 3 | Status: SHIPPED | OUTPATIENT
Start: 2024-01-19

## 2024-01-19 RX ORDER — LISINOPRIL 5 MG/1
5 TABLET ORAL DAILY
Qty: 30 TABLET | Refills: 5 | Status: SHIPPED | OUTPATIENT
Start: 2024-01-19 | End: 2024-07-17

## 2024-01-19 RX ORDER — IPRATROPIUM BROMIDE AND ALBUTEROL SULFATE 2.5; .5 MG/3ML; MG/3ML
3 SOLUTION RESPIRATORY (INHALATION)
Qty: 180 ML | Refills: 3 | Status: SHIPPED | OUTPATIENT
Start: 2024-01-19

## 2024-01-19 RX ORDER — ALBUTEROL SULFATE 0.83 MG/ML
2.5 SOLUTION RESPIRATORY (INHALATION) EVERY 4 HOURS PRN
Qty: 75 ML | Refills: 1 | Status: SHIPPED | OUTPATIENT
Start: 2024-01-19

## 2024-01-19 RX ORDER — METOPROLOL TARTRATE 25 MG/1
25 TABLET, FILM COATED ORAL 2 TIMES DAILY
Qty: 180 TABLET | Refills: 3 | Status: SHIPPED | OUTPATIENT
Start: 2024-01-19

## 2024-01-19 ASSESSMENT — ENCOUNTER SYMPTOMS
WEAKNESS: 0
HYPERTENSION: 1
DIFFICULTY URINATING: 0
SORE THROAT: 0
ARTHRALGIAS: 0
DIARRHEA: 0
EYE DISCHARGE: 0
HEMATURIA: 0
ACTIVITY CHANGE: 0
NERVOUS/ANXIOUS: 0
BACK PAIN: 0
ADENOPATHY: 0
ABDOMINAL PAIN: 0
NUMBNESS: 0
VOMITING: 0
SWEATS: 0
DIZZINESS: 0
NAUSEA: 0
HEADACHES: 1
ORTHOPNEA: 0
CONSTIPATION: 0
MYALGIAS: 0
BLOOD IN STOOL: 0
BRUISES/BLEEDS EASILY: 0
BLURRED VISION: 0
PALPITATIONS: 0
COUGH: 0
CHEST TIGHTNESS: 0
NECK PAIN: 1
DYSPHORIC MOOD: 0
FATIGUE: 0
SHORTNESS OF BREATH: 1
PND: 0

## 2024-01-19 NOTE — PROGRESS NOTES
Subjective   Patient ID: Yasmin Rodriguez is a 53 y.o. female who presents for Hypertension and Hospital Follow-up.  Hypertension  This is a recurrent problem. The current episode started more than 1 year ago. The problem has been waxing and waning since onset. The problem is resistant. Associated symptoms include headaches, neck pain and shortness of breath. Pertinent negatives include no blurred vision, chest pain, malaise/fatigue, orthopnea, palpitations, peripheral edema, PND or sweats. Risk factors for coronary artery disease include family history, obesity, sedentary lifestyle, smoking/tobacco exposure and stress. Compliance problems include exercise and psychosocial issues.    Patient here for multiple issues    Blood pressure has been high and low  Would resume lisinopril because it is elevated now  No chest pain or shortness of breath    Positive allergies and immune dysfunction and autonomic dysfunction keep follow-up with specialist    High blood sugar watch diet    High cholesterol stable watch blood work    Anxiety ongoing  Medical condition aggravates  No suicidal ideation no psychotic or manic symptoms    Patient is a smoker and should quit    Telephone visit        Review of Systems   Constitutional:  Negative for activity change, fatigue and malaise/fatigue.   HENT:  Negative for congestion and sore throat.    Eyes:  Negative for blurred vision and discharge.   Respiratory:  Positive for shortness of breath. Negative for cough and chest tightness.    Cardiovascular:  Negative for chest pain, palpitations, orthopnea, leg swelling and PND.   Gastrointestinal:  Negative for abdominal pain, blood in stool, constipation, diarrhea, nausea and vomiting.   Endocrine: Negative for cold intolerance and heat intolerance.   Genitourinary:  Negative for difficulty urinating and hematuria.   Musculoskeletal:  Positive for neck pain. Negative for arthralgias, back pain, gait problem and myalgias.    Allergic/Immunologic: Negative for environmental allergies.   Neurological:  Positive for headaches. Negative for dizziness, syncope, weakness and numbness.   Hematological:  Negative for adenopathy. Does not bruise/bleed easily.   Psychiatric/Behavioral:  Negative for dysphoric mood. The patient is not nervous/anxious.    All other systems reviewed and are negative.      Objective   BP (!) 131/98   Pulse 78   SpO2 98%    Physical Exam  phone  Assessment/Plan   Problem List Items Addressed This Visit       Allergies    Relevant Medications    cetirizine (ZyrTEC) 10 mg tablet    albuterol 2.5 mg /3 mL (0.083 %) nebulizer solution    albuterol 90 mcg/actuation inhaler    EPINEPHrine 0.3 mg/0.3 mL injection syringe    ipratropium-albuteroL (Duo-Neb) 0.5-2.5 mg/3 mL nebulizer solution    Anxiety disorder    Autonomic dysfunction    Primary hypertension - Primary    Relevant Medications    metoprolol tartrate (Lopressor) 25 mg tablet    lisinopril 5 mg tablet    Other Relevant Orders    Follow Up In [facility] - PCP    Chronic fatigue and immune dysfunction syndrome (CMS/HCC)    Current smoker    Elevated blood sugar    Relevant Medications    blood sugar diagnostic (Blood Glucose Test) strip    Mixed hyperlipidemia       Patient education provided.  Stay current with age appropriate health maintenance as instructed.  Appointment here or ER with new or worsening symptoms'  Keep appropriate follow-up visit.  Stay current with proper immunizations   3 month    Stressed importance of specialist follow-up as well  Stop smoking

## 2024-01-22 ENCOUNTER — TELEPHONE (OUTPATIENT)
Dept: PRIMARY CARE | Facility: CLINIC | Age: 54
End: 2024-01-22
Payer: COMMERCIAL

## 2024-01-22 NOTE — LETTER
Dear Yasmin Rodriguez ,     The Office Is contacting you today to inform you of the Insurance coverage denial for the medication Ajovy Autoinjector 225 mg/1.5 mL auto-injector. The insurance has denied the coverage due to the lack of office visits to see Dr. Almanzar for migraines and lack of a migraine diary that includes the severity, frequency, and number of headache/migraines you experience each month. Attached to this letter is the Denial Letter in its entirety. You will also receive the same letter in the mail. If you would like to go ahead with an appeal for this medication, please schedule an appointment with Dr. Almanzar at your earliest convenience so we can discuss and meet the insurance criteria for approval.      Thank you,  The Office of Dr. Almanzar

## 2024-01-22 NOTE — TELEPHONE ENCOUNTER
We received a request for prior authorization on the patient's Ajovy Autoinjector 225 mg/1.5 mL auto-injector  from their pharmacy. Prior authorization was submitted to insurance today. We will await their determination.

## 2024-01-24 NOTE — TELEPHONE ENCOUNTER
PA has been DENIED due to lack of documentation supporting the need for the medication.    Insurance is requiring documentation such as a headache diary, office notes, that include the following: severity, frequency, and number of headaches per month.     The patient has zero documentation within office notes that she has been seen for migraines over the last three years.     At this time, we cannot go forward with an appeal until an office visit related to migraines and a migraine diary are presented with the above criteria.     Denial letter scanned into media on 01.24.2024    Patient has been sent a [employer] message briefly explaining the denial.

## 2024-04-25 ENCOUNTER — TELEPHONE (OUTPATIENT)
Dept: OBGYN CLINIC | Age: 54
End: 2024-04-25

## 2024-04-25 NOTE — TELEPHONE ENCOUNTER
Patient is wanting to schedule Mammogram and Colonoscopy.  There are no orders in her chart for these tests.  Patient also states wants the lady Electrophysiologist she cannot remember her name.  Patient goes on to tell me she has severe anxiety and will need premedicated for these procedures/tests.  Please call patient and advise.  Called and gave message to Krystle at Dr. Mariano office.

## 2024-07-07 ENCOUNTER — APPOINTMENT (OUTPATIENT)
Dept: CT IMAGING | Age: 54
DRG: 284 | End: 2024-07-07
Payer: COMMERCIAL

## 2024-07-07 ENCOUNTER — HOSPITAL ENCOUNTER (INPATIENT)
Age: 54
LOS: 1 days | Discharge: ANOTHER HOSPITAL | DRG: 284 | End: 2024-07-08
Attending: EMERGENCY MEDICINE | Admitting: INTERNAL MEDICINE
Payer: COMMERCIAL

## 2024-07-07 DIAGNOSIS — R11.2 NAUSEA AND VOMITING, UNSPECIFIED VOMITING TYPE: Primary | ICD-10-CM

## 2024-07-07 DIAGNOSIS — R74.8 ELEVATED LIVER ENZYMES: ICD-10-CM

## 2024-07-07 DIAGNOSIS — E86.0 DEHYDRATION: ICD-10-CM

## 2024-07-07 PROBLEM — E87.20 METABOLIC ACIDOSIS: Status: ACTIVE | Noted: 2024-07-07

## 2024-07-07 LAB
ALBUMIN SERPL-MCNC: 4.5 G/DL (ref 3.5–4.6)
ALP SERPL-CCNC: 69 U/L (ref 40–130)
ALT SERPL-CCNC: 73 U/L (ref 0–33)
AMPHETAMINES UR QL SCN>500 NG/ML: ABNORMAL
AMYLASE SERPL-CCNC: 20 U/L (ref 22–93)
ANION GAP SERPL CALCULATED.3IONS-SCNC: 21 MEQ/L (ref 9–15)
AST SERPL-CCNC: 51 U/L (ref 0–35)
BARBITURATES UR QL SCN>200 NG/ML: ABNORMAL
BASOPHILS # BLD: 0 K/UL (ref 0–0.1)
BASOPHILS NFR BLD: 0.4 % (ref 0.1–1.2)
BENZODIAZ UR QL SCN: POSITIVE
BILIRUB SERPL-MCNC: 1.3 MG/DL (ref 0.2–0.7)
BILIRUB UR QL STRIP: ABNORMAL
BUN SERPL-MCNC: 18 MG/DL (ref 6–20)
CALCIUM SERPL-MCNC: 9.6 MG/DL (ref 8.5–9.9)
CHLORIDE SERPL-SCNC: 100 MEQ/L (ref 95–107)
CLARITY UR: CLEAR
CO2 SERPL-SCNC: 16 MEQ/L (ref 20–31)
COCAINE UR QL SCN: ABNORMAL
COLOR UR: ABNORMAL
CREAT SERPL-MCNC: 0.65 MG/DL (ref 0.5–0.9)
DRUG SCREEN COMMENT UR-IMP: ABNORMAL
EOSINOPHIL # BLD: 0 K/UL (ref 0–0.4)
EOSINOPHIL NFR BLD: 0.4 % (ref 0.7–5.8)
EPI CELLS #/AREA URNS HPF: ABNORMAL /HPF
ERYTHROCYTE [DISTWIDTH] IN BLOOD BY AUTOMATED COUNT: 13.1 % (ref 11.7–14.4)
GLOBULIN SER CALC-MCNC: 3.6 G/DL (ref 2.3–3.5)
GLUCOSE SERPL-MCNC: 130 MG/DL (ref 70–99)
GLUCOSE UR STRIP-MCNC: 100 MG/DL
HCT VFR BLD AUTO: 50.4 % (ref 37–47)
HGB BLD-MCNC: 16.9 G/DL (ref 11.2–15.7)
HGB UR QL STRIP: ABNORMAL
IMM GRANULOCYTES # BLD: 0 K/UL
IMM GRANULOCYTES NFR BLD: 0.4 %
KETONES UR STRIP-MCNC: >=80 MG/DL
LACTATE BLDV-SCNC: 2.6 MMOL/L (ref 0.5–2.2)
LACTATE BLDV-SCNC: 3.2 MMOL/L (ref 0.5–2.2)
LEUKOCYTE ESTERASE UR QL STRIP: NEGATIVE
LIPASE SERPL-CCNC: 12 U/L (ref 12–95)
LYMPHOCYTES # BLD: 2.3 K/UL (ref 1.2–3.7)
LYMPHOCYTES NFR BLD: 20.2 %
MAGNESIUM SERPL-MCNC: 1.9 MG/DL (ref 1.7–2.4)
MCH RBC QN AUTO: 30.1 PG (ref 25.6–32.2)
MCHC RBC AUTO-ENTMCNC: 33.5 % (ref 32.2–35.5)
MCV RBC AUTO: 89.8 FL (ref 79.4–94.8)
MONOCYTES # BLD: 0.6 K/UL (ref 0.2–0.9)
MONOCYTES NFR BLD: 5.7 % (ref 4.7–12.5)
NEUTROPHILS # BLD: 8.2 K/UL (ref 1.6–6.1)
NEUTS SEG NFR BLD: 72.9 % (ref 34–71.1)
NITRITE UR QL STRIP: NEGATIVE
OPIATES UR QL SCN: ABNORMAL
PCP UR QL SCN>25 NG/ML: ABNORMAL
PH UR STRIP: 5.5 [PH] (ref 5–9)
PLATELET # BLD AUTO: 400 K/UL (ref 182–369)
POTASSIUM SERPL-SCNC: 3.7 MEQ/L (ref 3.4–4.9)
PROT SERPL-MCNC: 8.1 G/DL (ref 6.3–8)
PROT UR STRIP-MCNC: 100 MG/DL
RBC # BLD AUTO: 5.61 M/UL (ref 3.93–5.22)
RBC #/AREA URNS HPF: ABNORMAL /HPF (ref 0–2)
SARS-COV-2 RDRP RESP QL NAA+PROBE: NOT DETECTED
SODIUM SERPL-SCNC: 137 MEQ/L (ref 135–144)
SP GR UR STRIP: >=1.03 (ref 1–1.03)
THC UR QL SCN>50 NG/ML: POSITIVE
TRICYCLICS UR QL SCN: ABNORMAL
URINE REFLEX TO CULTURE: ABNORMAL
UROBILINOGEN UR STRIP-ACNC: 2 E.U./DL
WBC # BLD AUTO: 11.2 K/UL (ref 4–10)
WBC #/AREA URNS HPF: ABNORMAL /HPF (ref 0–5)

## 2024-07-07 PROCEDURE — 85025 COMPLETE CBC W/AUTO DIFF WBC: CPT

## 2024-07-07 PROCEDURE — 99285 EMERGENCY DEPT VISIT HI MDM: CPT

## 2024-07-07 PROCEDURE — 96375 TX/PRO/DX INJ NEW DRUG ADDON: CPT

## 2024-07-07 PROCEDURE — 83735 ASSAY OF MAGNESIUM: CPT

## 2024-07-07 PROCEDURE — 2500000003 HC RX 250 WO HCPCS: Performed by: EMERGENCY MEDICINE

## 2024-07-07 PROCEDURE — 1210000000 HC MED SURG R&B

## 2024-07-07 PROCEDURE — 6360000002 HC RX W HCPCS: Performed by: INTERNAL MEDICINE

## 2024-07-07 PROCEDURE — 82150 ASSAY OF AMYLASE: CPT

## 2024-07-07 PROCEDURE — 96361 HYDRATE IV INFUSION ADD-ON: CPT

## 2024-07-07 PROCEDURE — 87635 SARS-COV-2 COVID-19 AMP PRB: CPT

## 2024-07-07 PROCEDURE — 36415 COLL VENOUS BLD VENIPUNCTURE: CPT

## 2024-07-07 PROCEDURE — 83690 ASSAY OF LIPASE: CPT

## 2024-07-07 PROCEDURE — 83605 ASSAY OF LACTIC ACID: CPT

## 2024-07-07 PROCEDURE — 80306 DRUG TEST PRSMV INSTRMNT: CPT

## 2024-07-07 PROCEDURE — 74176 CT ABD & PELVIS W/O CONTRAST: CPT

## 2024-07-07 PROCEDURE — 2580000003 HC RX 258: Performed by: EMERGENCY MEDICINE

## 2024-07-07 PROCEDURE — 96374 THER/PROPH/DIAG INJ IV PUSH: CPT

## 2024-07-07 PROCEDURE — 80053 COMPREHEN METABOLIC PANEL: CPT

## 2024-07-07 PROCEDURE — 81001 URINALYSIS AUTO W/SCOPE: CPT

## 2024-07-07 PROCEDURE — 6360000002 HC RX W HCPCS: Performed by: EMERGENCY MEDICINE

## 2024-07-07 RX ORDER — ONDANSETRON 2 MG/ML
4 INJECTION INTRAMUSCULAR; INTRAVENOUS EVERY 6 HOURS PRN
Status: DISCONTINUED | OUTPATIENT
Start: 2024-07-07 | End: 2024-07-08 | Stop reason: HOSPADM

## 2024-07-07 RX ORDER — SODIUM CHLORIDE 0.9 % (FLUSH) 0.9 %
10 SYRINGE (ML) INJECTION EVERY 12 HOURS SCHEDULED
Status: DISCONTINUED | OUTPATIENT
Start: 2024-07-07 | End: 2024-07-08 | Stop reason: HOSPADM

## 2024-07-07 RX ORDER — METOCLOPRAMIDE HYDROCHLORIDE 5 MG/ML
10 INJECTION INTRAMUSCULAR; INTRAVENOUS ONCE
Status: COMPLETED | OUTPATIENT
Start: 2024-07-07 | End: 2024-07-07

## 2024-07-07 RX ORDER — SODIUM CHLORIDE 9 MG/ML
INJECTION, SOLUTION INTRAVENOUS CONTINUOUS
Status: DISCONTINUED | OUTPATIENT
Start: 2024-07-07 | End: 2024-07-08

## 2024-07-07 RX ORDER — SUMATRIPTAN 6 MG/.5ML
6 INJECTION, SOLUTION SUBCUTANEOUS
COMMUNITY

## 2024-07-07 RX ORDER — POLYETHYLENE GLYCOL 3350 17 G/17G
17 POWDER, FOR SOLUTION ORAL DAILY PRN
Status: DISCONTINUED | OUTPATIENT
Start: 2024-07-07 | End: 2024-07-08 | Stop reason: HOSPADM

## 2024-07-07 RX ORDER — DIPHENHYDRAMINE HYDROCHLORIDE 50 MG/ML
50 INJECTION INTRAMUSCULAR; INTRAVENOUS ONCE
Status: COMPLETED | OUTPATIENT
Start: 2024-07-07 | End: 2024-07-07

## 2024-07-07 RX ORDER — ACETAMINOPHEN 650 MG/1
650 SUPPOSITORY RECTAL EVERY 6 HOURS PRN
Status: DISCONTINUED | OUTPATIENT
Start: 2024-07-07 | End: 2024-07-08 | Stop reason: HOSPADM

## 2024-07-07 RX ORDER — HYDROMORPHONE HYDROCHLORIDE 1 MG/ML
0.5 INJECTION, SOLUTION INTRAMUSCULAR; INTRAVENOUS; SUBCUTANEOUS EVERY 4 HOURS PRN
Status: DISCONTINUED | OUTPATIENT
Start: 2024-07-07 | End: 2024-07-08 | Stop reason: HOSPADM

## 2024-07-07 RX ORDER — 0.9 % SODIUM CHLORIDE 0.9 %
1000 INTRAVENOUS SOLUTION INTRAVENOUS ONCE
Status: COMPLETED | OUTPATIENT
Start: 2024-07-07 | End: 2024-07-07

## 2024-07-07 RX ORDER — OXYCODONE HYDROCHLORIDE 5 MG/1
5 TABLET ORAL EVERY 6 HOURS PRN
Status: DISCONTINUED | OUTPATIENT
Start: 2024-07-07 | End: 2024-07-08 | Stop reason: HOSPADM

## 2024-07-07 RX ORDER — SODIUM CHLORIDE 9 MG/ML
INJECTION, SOLUTION INTRAVENOUS CONTINUOUS
Status: DISCONTINUED | OUTPATIENT
Start: 2024-07-07 | End: 2024-07-07

## 2024-07-07 RX ORDER — ACETAMINOPHEN 325 MG/1
650 TABLET ORAL EVERY 6 HOURS PRN
Status: DISCONTINUED | OUTPATIENT
Start: 2024-07-07 | End: 2024-07-08 | Stop reason: HOSPADM

## 2024-07-07 RX ORDER — SODIUM CHLORIDE 0.9 % (FLUSH) 0.9 %
10 SYRINGE (ML) INJECTION PRN
Status: DISCONTINUED | OUTPATIENT
Start: 2024-07-07 | End: 2024-07-08 | Stop reason: HOSPADM

## 2024-07-07 RX ORDER — PROMETHAZINE HYDROCHLORIDE 12.5 MG/1
12.5 TABLET ORAL EVERY 6 HOURS PRN
Status: DISCONTINUED | OUTPATIENT
Start: 2024-07-07 | End: 2024-07-08 | Stop reason: HOSPADM

## 2024-07-07 RX ORDER — SODIUM CHLORIDE 9 MG/ML
INJECTION, SOLUTION INTRAVENOUS PRN
Status: DISCONTINUED | OUTPATIENT
Start: 2024-07-07 | End: 2024-07-08 | Stop reason: HOSPADM

## 2024-07-07 RX ORDER — ENOXAPARIN SODIUM 100 MG/ML
40 INJECTION SUBCUTANEOUS DAILY
Status: DISCONTINUED | OUTPATIENT
Start: 2024-07-08 | End: 2024-07-08 | Stop reason: HOSPADM

## 2024-07-07 RX ADMIN — DIPHENHYDRAMINE HYDROCHLORIDE 50 MG: 50 INJECTION INTRAMUSCULAR; INTRAVENOUS at 17:37

## 2024-07-07 RX ADMIN — SODIUM CHLORIDE 1000 ML: 9 INJECTION, SOLUTION INTRAVENOUS at 18:36

## 2024-07-07 RX ADMIN — SODIUM CHLORIDE: 900 INJECTION, SOLUTION INTRAVENOUS at 20:52

## 2024-07-07 RX ADMIN — METOCLOPRAMIDE HYDROCHLORIDE 10 MG: 5 INJECTION INTRAMUSCULAR; INTRAVENOUS at 17:46

## 2024-07-07 RX ADMIN — SODIUM CHLORIDE 1000 ML: 9 INJECTION, SOLUTION INTRAVENOUS at 17:37

## 2024-07-07 RX ADMIN — FAMOTIDINE 20 MG: 10 INJECTION, SOLUTION INTRAVENOUS at 17:43

## 2024-07-07 RX ADMIN — ONDANSETRON 4 MG: 2 INJECTION INTRAMUSCULAR; INTRAVENOUS at 22:08

## 2024-07-07 ASSESSMENT — PAIN - FUNCTIONAL ASSESSMENT: PAIN_FUNCTIONAL_ASSESSMENT: NONE - DENIES PAIN

## 2024-07-07 ASSESSMENT — LIFESTYLE VARIABLES
HOW MANY STANDARD DRINKS CONTAINING ALCOHOL DO YOU HAVE ON A TYPICAL DAY: PATIENT DOES NOT DRINK
HOW OFTEN DO YOU HAVE A DRINK CONTAINING ALCOHOL: NEVER

## 2024-07-07 NOTE — ED PROVIDER NOTES
[facility] ED  eMERGENCY dEPARTMENT eNCOUnter      Pt Name: Isis Corral  MRN: [id number]  Birthdate 1970  Date of evaluation: 2024  Provider: Birgit Monge DO    CHIEF COMPLAINT       Chief Complaint   Patient presents with    Emesis         HISTORY OF PRESENT ILLNESS   (Location/Symptom, Timing/Onset,Context/Setting, Quality, Duration, Modifying Factors, Severity)  Note limiting factors.   Isis Corral is a 53 y.o. female who presents to the emergency department for patient accompanied by father come to emergency because sickness for the last 2 days time being unable to keep anything down including her medication and her anxiety is acting up as patient has not taken anxiety medication for 3 days patient nobody sick at home at this time patient has no diarrhea last bowel movement was today has no blood in the stool no blood in the vomitus has no fever no chills history of anxiety depression chronic back pain hypertension endometriosis history of asthma  3 para 3 patient is smoker denies any alcohol use patient is also does marijuana use, also complaining of intermittent abdominal cramping    HPI    NursingNotes were reviewed.    REVIEW OF SYSTEMS    (2-9 systems for level 4, 10 or more for level 5)     Review of Systems   Constitutional:  Positive for activity change and appetite change. Negative for fever.   HENT:  Negative for congestion, drooling, facial swelling, mouth sores, nosebleeds, sinus pressure, sore throat, trouble swallowing and voice change.    Eyes:  Negative for pain, discharge, redness and visual disturbance.   Respiratory:  Negative for cough, choking, chest tightness, shortness of breath, wheezing and stridor.    Cardiovascular:  Negative for chest pain, palpitations and leg swelling.   Gastrointestinal:  Positive for abdominal pain, nausea and vomiting. Negative for blood in stool, constipation and diarrhea.   Endocrine: Negative for cold

## 2024-07-08 ENCOUNTER — HOSPITAL ENCOUNTER (OUTPATIENT)
Age: 54
Setting detail: OBSERVATION
Discharge: HOME | End: 2024-07-10
Attending: COLON & RECTAL SURGERY | Admitting: COLON & RECTAL SURGERY
Payer: COMMERCIAL

## 2024-07-08 ENCOUNTER — ANESTHESIA EVENT (OUTPATIENT)
Dept: OPERATING ROOM | Age: 54
End: 2024-07-08
Payer: COMMERCIAL

## 2024-07-08 ENCOUNTER — PREP FOR PROCEDURE (OUTPATIENT)
Dept: SURGERY | Age: 54
End: 2024-07-08

## 2024-07-08 ENCOUNTER — APPOINTMENT (OUTPATIENT)
Dept: ULTRASOUND IMAGING | Age: 54
DRG: 284 | End: 2024-07-08
Payer: COMMERCIAL

## 2024-07-08 ENCOUNTER — APPOINTMENT (OUTPATIENT)
Dept: GENERAL RADIOLOGY | Age: 54
End: 2024-07-08
Attending: COLON & RECTAL SURGERY
Payer: COMMERCIAL

## 2024-07-08 ENCOUNTER — ANESTHESIA (OUTPATIENT)
Dept: OPERATING ROOM | Age: 54
End: 2024-07-08
Payer: COMMERCIAL

## 2024-07-08 VITALS
RESPIRATION RATE: 18 BRPM | BODY MASS INDEX: 24.99 KG/M2 | HEIGHT: 66 IN | WEIGHT: 155.5 LBS | TEMPERATURE: 98.4 F | SYSTOLIC BLOOD PRESSURE: 138 MMHG | DIASTOLIC BLOOD PRESSURE: 116 MMHG | HEART RATE: 62 BPM | OXYGEN SATURATION: 100 %

## 2024-07-08 DIAGNOSIS — K81.0 ACUTE CHOLECYSTITIS: ICD-10-CM

## 2024-07-08 PROBLEM — K81.9 CHOLECYSTITIS: Status: ACTIVE | Noted: 2024-07-08

## 2024-07-08 LAB
ALBUMIN SERPL-MCNC: 3.9 G/DL (ref 3.5–4.6)
ALP SERPL-CCNC: 57 U/L (ref 40–130)
ALT SERPL-CCNC: 84 U/L (ref 0–33)
ANION GAP SERPL CALCULATED.3IONS-SCNC: 15 MEQ/L (ref 9–15)
AST SERPL-CCNC: 56 U/L (ref 0–35)
BASOPHILS # BLD: 0.1 K/UL (ref 0–0.1)
BASOPHILS NFR BLD: 0.5 % (ref 0.1–1.2)
BILIRUB DIRECT SERPL-MCNC: 0.5 MG/DL (ref 0–0.4)
BILIRUB INDIRECT SERPL-MCNC: 0.7 MG/DL (ref 0–0.6)
BILIRUB SERPL-MCNC: 1.2 MG/DL (ref 0.2–0.7)
BUN SERPL-MCNC: 11 MG/DL (ref 6–20)
CALCIUM SERPL-MCNC: 8.4 MG/DL (ref 8.5–9.9)
CHLORIDE SERPL-SCNC: 106 MEQ/L (ref 95–107)
CO2 SERPL-SCNC: 18 MEQ/L (ref 20–31)
CREAT SERPL-MCNC: 0.43 MG/DL (ref 0.5–0.9)
EOSINOPHIL # BLD: 0.1 K/UL (ref 0–0.4)
EOSINOPHIL NFR BLD: 1.1 % (ref 0.7–5.8)
ERYTHROCYTE [DISTWIDTH] IN BLOOD BY AUTOMATED COUNT: 13.2 % (ref 11.7–14.4)
GLUCOSE SERPL-MCNC: 74 MG/DL (ref 70–99)
HAV IGM SER IA-ACNC: NONREACTIVE
HCT VFR BLD AUTO: 45 % (ref 37–47)
HEPATITIS B CORE IGM ANTIBODY: NONREACTIVE
HEPATITIS B SURF AG,XHBAGS: NONREACTIVE
HEPATITIS C ANTIBODY: NONREACTIVE
HGB BLD-MCNC: 15.2 G/DL (ref 11.2–15.7)
IMM GRANULOCYTES # BLD: 0 K/UL
IMM GRANULOCYTES NFR BLD: 0.3 %
LACTATE BLDV-SCNC: 1.3 MMOL/L (ref 0.5–2.2)
LYMPHOCYTES # BLD: 3.1 K/UL (ref 1.2–3.7)
LYMPHOCYTES NFR BLD: 29.8 %
MAGNESIUM SERPL-MCNC: 1.8 MG/DL (ref 1.7–2.4)
MCH RBC QN AUTO: 30.6 PG (ref 25.6–32.2)
MCHC RBC AUTO-ENTMCNC: 33.8 % (ref 32.2–35.5)
MCV RBC AUTO: 90.7 FL (ref 79.4–94.8)
MONOCYTES # BLD: 0.7 K/UL (ref 0.2–0.9)
MONOCYTES NFR BLD: 7.2 % (ref 4.7–12.5)
NEUTROPHILS # BLD: 6.3 K/UL (ref 1.6–6.1)
NEUTS SEG NFR BLD: 61.1 % (ref 34–71.1)
PLATELET # BLD AUTO: 328 K/UL (ref 182–369)
POTASSIUM SERPL-SCNC: 3.8 MEQ/L (ref 3.4–4.9)
PROT SERPL-MCNC: 6.7 G/DL (ref 6.3–8)
RBC # BLD AUTO: 4.96 M/UL (ref 3.93–5.22)
SODIUM SERPL-SCNC: 139 MEQ/L (ref 135–144)
WBC # BLD AUTO: 10.3 K/UL (ref 4–10)

## 2024-07-08 PROCEDURE — 6360000002 HC RX W HCPCS: Performed by: ANESTHESIOLOGY

## 2024-07-08 PROCEDURE — C1758 CATHETER, URETERAL: HCPCS | Performed by: COLON & RECTAL SURGERY

## 2024-07-08 PROCEDURE — 6370000000 HC RX 637 (ALT 250 FOR IP)

## 2024-07-08 PROCEDURE — 76705 ECHO EXAM OF ABDOMEN: CPT

## 2024-07-08 PROCEDURE — 2709999900 HC NON-CHARGEABLE SUPPLY: Performed by: COLON & RECTAL SURGERY

## 2024-07-08 PROCEDURE — 80074 ACUTE HEPATITIS PANEL: CPT

## 2024-07-08 PROCEDURE — 3600000014 HC SURGERY LEVEL 4 ADDTL 15MIN: Performed by: COLON & RECTAL SURGERY

## 2024-07-08 PROCEDURE — 2580000003 HC RX 258: Performed by: INTERNAL MEDICINE

## 2024-07-08 PROCEDURE — 3700000000 HC ANESTHESIA ATTENDED CARE: Performed by: COLON & RECTAL SURGERY

## 2024-07-08 PROCEDURE — 83735 ASSAY OF MAGNESIUM: CPT

## 2024-07-08 PROCEDURE — A4217 STERILE WATER/SALINE, 500 ML: HCPCS | Performed by: COLON & RECTAL SURGERY

## 2024-07-08 PROCEDURE — 2580000003 HC RX 258: Performed by: COLON & RECTAL SURGERY

## 2024-07-08 PROCEDURE — 3600000004 HC SURGERY LEVEL 4 BASE: Performed by: COLON & RECTAL SURGERY

## 2024-07-08 PROCEDURE — 88304 TISSUE EXAM BY PATHOLOGIST: CPT

## 2024-07-08 PROCEDURE — 3700000001 HC ADD 15 MINUTES (ANESTHESIA): Performed by: COLON & RECTAL SURGERY

## 2024-07-08 PROCEDURE — 6370000000 HC RX 637 (ALT 250 FOR IP): Performed by: INTERNAL MEDICINE

## 2024-07-08 PROCEDURE — 2580000003 HC RX 258: Performed by: NURSE ANESTHETIST, CERTIFIED REGISTERED

## 2024-07-08 PROCEDURE — 6360000002 HC RX W HCPCS: Performed by: INTERNAL MEDICINE

## 2024-07-08 PROCEDURE — 2500000003 HC RX 250 WO HCPCS: Performed by: COLON & RECTAL SURGERY

## 2024-07-08 PROCEDURE — G0379 DIRECT REFER HOSPITAL OBSERV: HCPCS

## 2024-07-08 PROCEDURE — 6360000002 HC RX W HCPCS: Performed by: COLON & RECTAL SURGERY

## 2024-07-08 PROCEDURE — 6360000004 HC RX CONTRAST MEDICATION: Performed by: COLON & RECTAL SURGERY

## 2024-07-08 PROCEDURE — 80076 HEPATIC FUNCTION PANEL: CPT

## 2024-07-08 PROCEDURE — 85025 COMPLETE CBC W/AUTO DIFF WBC: CPT

## 2024-07-08 PROCEDURE — 64488 TAP BLOCK BI INJECTION: CPT | Performed by: ANESTHESIOLOGY

## 2024-07-08 PROCEDURE — G0378 HOSPITAL OBSERVATION PER HR: HCPCS

## 2024-07-08 PROCEDURE — 36415 COLL VENOUS BLD VENIPUNCTURE: CPT

## 2024-07-08 PROCEDURE — 7100000000 HC PACU RECOVERY - FIRST 15 MIN: Performed by: COLON & RECTAL SURGERY

## 2024-07-08 PROCEDURE — 6360000002 HC RX W HCPCS: Performed by: NURSE ANESTHETIST, CERTIFIED REGISTERED

## 2024-07-08 PROCEDURE — 74300 X-RAY BILE DUCTS/PANCREAS: CPT

## 2024-07-08 PROCEDURE — 80048 BASIC METABOLIC PNL TOTAL CA: CPT

## 2024-07-08 PROCEDURE — 47563 LAPARO CHOLECYSTECTOMY/GRAPH: CPT | Performed by: COLON & RECTAL SURGERY

## 2024-07-08 PROCEDURE — 83605 ASSAY OF LACTIC ACID: CPT

## 2024-07-08 PROCEDURE — 6360000002 HC RX W HCPCS: Performed by: ANESTHESIOLOGIST ASSISTANT

## 2024-07-08 PROCEDURE — 6370000000 HC RX 637 (ALT 250 FOR IP): Performed by: NURSE ANESTHETIST, CERTIFIED REGISTERED

## 2024-07-08 PROCEDURE — 7100000001 HC PACU RECOVERY - ADDTL 15 MIN: Performed by: COLON & RECTAL SURGERY

## 2024-07-08 PROCEDURE — 99253 IP/OBS CNSLTJ NEW/EST LOW 45: CPT | Performed by: COLON & RECTAL SURGERY

## 2024-07-08 PROCEDURE — 2500000003 HC RX 250 WO HCPCS: Performed by: NURSE ANESTHETIST, CERTIFIED REGISTERED

## 2024-07-08 PROCEDURE — 6370000000 HC RX 637 (ALT 250 FOR IP): Performed by: COLON & RECTAL SURGERY

## 2024-07-08 RX ORDER — SODIUM CHLORIDE 9 MG/ML
INJECTION, SOLUTION INTRAVENOUS PRN
Status: DISCONTINUED | OUTPATIENT
Start: 2024-07-08 | End: 2024-07-08 | Stop reason: HOSPADM

## 2024-07-08 RX ORDER — MIDAZOLAM HYDROCHLORIDE 1 MG/ML
INJECTION INTRAMUSCULAR; INTRAVENOUS PRN
Status: DISCONTINUED | OUTPATIENT
Start: 2024-07-08 | End: 2024-07-08 | Stop reason: SDUPTHER

## 2024-07-08 RX ORDER — ACETAMINOPHEN 325 MG/1
650 TABLET ORAL EVERY 6 HOURS PRN
OUTPATIENT
Start: 2024-07-08

## 2024-07-08 RX ORDER — IPRATROPIUM BROMIDE AND ALBUTEROL SULFATE 2.5; .5 MG/3ML; MG/3ML
1 SOLUTION RESPIRATORY (INHALATION) EVERY 4 HOURS PRN
Status: DISCONTINUED | OUTPATIENT
Start: 2024-07-08 | End: 2024-07-10 | Stop reason: HOSPADM

## 2024-07-08 RX ORDER — ESMOLOL HYDROCHLORIDE 10 MG/ML
INJECTION INTRAVENOUS PRN
Status: DISCONTINUED | OUTPATIENT
Start: 2024-07-08 | End: 2024-07-08 | Stop reason: SDUPTHER

## 2024-07-08 RX ORDER — HYDROMORPHONE HYDROCHLORIDE 1 MG/ML
1 INJECTION, SOLUTION INTRAMUSCULAR; INTRAVENOUS; SUBCUTANEOUS
Status: DISCONTINUED | OUTPATIENT
Start: 2024-07-08 | End: 2024-07-09

## 2024-07-08 RX ORDER — POLYETHYLENE GLYCOL 3350 17 G/17G
17 POWDER, FOR SOLUTION ORAL DAILY PRN
OUTPATIENT
Start: 2024-07-08

## 2024-07-08 RX ORDER — TRAZODONE HYDROCHLORIDE 50 MG/1
50 TABLET ORAL NIGHTLY
Status: DISCONTINUED | OUTPATIENT
Start: 2024-07-08 | End: 2024-07-10 | Stop reason: HOSPADM

## 2024-07-08 RX ORDER — BENZTROPINE MESYLATE 1 MG/1
2 TABLET ORAL 2 TIMES DAILY
OUTPATIENT
Start: 2024-07-08

## 2024-07-08 RX ORDER — LANOLIN ALCOHOL/MO/W.PET/CERES
400 CREAM (GRAM) TOPICAL DAILY
Status: DISCONTINUED | OUTPATIENT
Start: 2024-07-08 | End: 2024-07-08 | Stop reason: HOSPADM

## 2024-07-08 RX ORDER — PROMETHAZINE HYDROCHLORIDE 25 MG/ML
25 INJECTION, SOLUTION INTRAMUSCULAR; INTRAVENOUS ONCE
Status: COMPLETED | OUTPATIENT
Start: 2024-07-08 | End: 2024-07-08

## 2024-07-08 RX ORDER — MEPERIDINE HYDROCHLORIDE 25 MG/ML
12.5 INJECTION INTRAMUSCULAR; INTRAVENOUS; SUBCUTANEOUS
Status: DISCONTINUED | OUTPATIENT
Start: 2024-07-08 | End: 2024-07-09

## 2024-07-08 RX ORDER — SODIUM CHLORIDE 9 MG/ML
INJECTION, SOLUTION INTRAVENOUS PRN
Status: DISCONTINUED | OUTPATIENT
Start: 2024-07-08 | End: 2024-07-10 | Stop reason: HOSPADM

## 2024-07-08 RX ORDER — ROPINIROLE 1 MG/1
2 TABLET, FILM COATED ORAL NIGHTLY
OUTPATIENT
Start: 2024-07-08

## 2024-07-08 RX ORDER — SODIUM CHLORIDE 9 MG/ML
INJECTION, SOLUTION INTRAVENOUS PRN
OUTPATIENT
Start: 2024-07-08

## 2024-07-08 RX ORDER — SODIUM CHLORIDE 0.9 % (FLUSH) 0.9 %
5-40 SYRINGE (ML) INJECTION PRN
Status: DISCONTINUED | OUTPATIENT
Start: 2024-07-08 | End: 2024-07-10 | Stop reason: HOSPADM

## 2024-07-08 RX ORDER — SODIUM CHLORIDE 0.9 % (FLUSH) 0.9 %
5-40 SYRINGE (ML) INJECTION PRN
Status: DISCONTINUED | OUTPATIENT
Start: 2024-07-08 | End: 2024-07-08 | Stop reason: HOSPADM

## 2024-07-08 RX ORDER — SUCCINYLCHOLINE/SOD CL,ISO/PF 100 MG/5ML
SYRINGE (ML) INTRAVENOUS PRN
Status: DISCONTINUED | OUTPATIENT
Start: 2024-07-08 | End: 2024-07-08 | Stop reason: SDUPTHER

## 2024-07-08 RX ORDER — LANOLIN ALCOHOL/MO/W.PET/CERES
400 CREAM (GRAM) TOPICAL DAILY
Status: DISCONTINUED | OUTPATIENT
Start: 2024-07-08 | End: 2024-07-10 | Stop reason: HOSPADM

## 2024-07-08 RX ORDER — DULOXETIN HYDROCHLORIDE 60 MG/1
120 CAPSULE, DELAYED RELEASE ORAL DAILY
Status: DISCONTINUED | OUTPATIENT
Start: 2024-07-08 | End: 2024-07-10 | Stop reason: HOSPADM

## 2024-07-08 RX ORDER — TOPIRAMATE 100 MG/1
100 TABLET, FILM COATED ORAL 2 TIMES DAILY
Status: DISCONTINUED | OUTPATIENT
Start: 2024-07-08 | End: 2024-07-10 | Stop reason: HOSPADM

## 2024-07-08 RX ORDER — OXYCODONE HYDROCHLORIDE 5 MG/1
5 TABLET ORAL
Status: DISCONTINUED | OUTPATIENT
Start: 2024-07-08 | End: 2024-07-09

## 2024-07-08 RX ORDER — SODIUM CHLORIDE 0.9 % (FLUSH) 0.9 %
5-40 SYRINGE (ML) INJECTION EVERY 12 HOURS SCHEDULED
Status: DISCONTINUED | OUTPATIENT
Start: 2024-07-08 | End: 2024-07-10 | Stop reason: HOSPADM

## 2024-07-08 RX ORDER — BENZTROPINE MESYLATE 0.5 MG/1
2 TABLET ORAL 2 TIMES DAILY
Status: DISCONTINUED | OUTPATIENT
Start: 2024-07-08 | End: 2024-07-10 | Stop reason: HOSPADM

## 2024-07-08 RX ORDER — HYDROMORPHONE HYDROCHLORIDE 1 MG/ML
0.5 INJECTION, SOLUTION INTRAMUSCULAR; INTRAVENOUS; SUBCUTANEOUS EVERY 4 HOURS PRN
OUTPATIENT
Start: 2024-07-08

## 2024-07-08 RX ORDER — WOUND DRESSING ADHESIVE - LIQUID
LIQUID MISCELLANEOUS PRN
Status: DISCONTINUED | OUTPATIENT
Start: 2024-07-08 | End: 2024-07-08 | Stop reason: ALTCHOICE

## 2024-07-08 RX ORDER — ONDANSETRON 2 MG/ML
4 INJECTION INTRAMUSCULAR; INTRAVENOUS EVERY 6 HOURS PRN
Status: DISCONTINUED | OUTPATIENT
Start: 2024-07-08 | End: 2024-07-10 | Stop reason: HOSPADM

## 2024-07-08 RX ORDER — SODIUM CHLORIDE 9 MG/ML
INJECTION, SOLUTION INTRAVENOUS CONTINUOUS
OUTPATIENT
Start: 2024-07-08

## 2024-07-08 RX ORDER — SODIUM CHLORIDE 0.9 % (FLUSH) 0.9 %
5-40 SYRINGE (ML) INJECTION PRN
Status: CANCELLED | OUTPATIENT
Start: 2024-07-08

## 2024-07-08 RX ORDER — DIPHENHYDRAMINE HYDROCHLORIDE 50 MG/ML
12.5 INJECTION INTRAMUSCULAR; INTRAVENOUS
Status: ACTIVE | OUTPATIENT
Start: 2024-07-08 | End: 2024-07-09

## 2024-07-08 RX ORDER — ONDANSETRON 2 MG/ML
4 INJECTION INTRAMUSCULAR; INTRAVENOUS
Status: DISPENSED | OUTPATIENT
Start: 2024-07-08 | End: 2024-07-09

## 2024-07-08 RX ORDER — DEXAMETHASONE SODIUM PHOSPHATE 10 MG/ML
INJECTION INTRAMUSCULAR; INTRAVENOUS PRN
Status: DISCONTINUED | OUTPATIENT
Start: 2024-07-08 | End: 2024-07-08 | Stop reason: SDUPTHER

## 2024-07-08 RX ORDER — ALPRAZOLAM 1 MG/1
1 TABLET ORAL 3 TIMES DAILY
COMMUNITY

## 2024-07-08 RX ORDER — DOCUSATE SODIUM 100 MG/1
100 CAPSULE, LIQUID FILLED ORAL 2 TIMES DAILY
OUTPATIENT
Start: 2024-07-08

## 2024-07-08 RX ORDER — MAGNESIUM HYDROXIDE 1200 MG/15ML
LIQUID ORAL CONTINUOUS PRN
Status: DISCONTINUED | OUTPATIENT
Start: 2024-07-08 | End: 2024-07-08 | Stop reason: HOSPADM

## 2024-07-08 RX ORDER — ALBUTEROL SULFATE 90 UG/1
AEROSOL, METERED RESPIRATORY (INHALATION) PRN
Status: DISCONTINUED | OUTPATIENT
Start: 2024-07-08 | End: 2024-07-08 | Stop reason: SDUPTHER

## 2024-07-08 RX ORDER — BENZTROPINE MESYLATE 1 MG/1
2 TABLET ORAL 2 TIMES DAILY
Status: DISCONTINUED | OUTPATIENT
Start: 2024-07-08 | End: 2024-07-08 | Stop reason: HOSPADM

## 2024-07-08 RX ORDER — ONDANSETRON 2 MG/ML
INJECTION INTRAMUSCULAR; INTRAVENOUS PRN
Status: DISCONTINUED | OUTPATIENT
Start: 2024-07-08 | End: 2024-07-08 | Stop reason: SDUPTHER

## 2024-07-08 RX ORDER — SODIUM CHLORIDE 9 MG/ML
INJECTION, SOLUTION INTRAVENOUS PRN
Status: CANCELLED | OUTPATIENT
Start: 2024-07-08

## 2024-07-08 RX ORDER — LIDOCAINE HYDROCHLORIDE 20 MG/ML
INJECTION, SOLUTION INTRAVENOUS PRN
Status: DISCONTINUED | OUTPATIENT
Start: 2024-07-08 | End: 2024-07-08 | Stop reason: SDUPTHER

## 2024-07-08 RX ORDER — DULOXETIN HYDROCHLORIDE 30 MG/1
120 CAPSULE, DELAYED RELEASE ORAL DAILY
Status: DISCONTINUED | OUTPATIENT
Start: 2024-07-08 | End: 2024-07-08

## 2024-07-08 RX ORDER — BUSPIRONE HYDROCHLORIDE 10 MG/1
20 TABLET ORAL 3 TIMES DAILY
Status: DISCONTINUED | OUTPATIENT
Start: 2024-07-08 | End: 2024-07-10 | Stop reason: HOSPADM

## 2024-07-08 RX ORDER — METHYLPHENIDATE HYDROCHLORIDE 5 MG/1
20 TABLET ORAL 3 TIMES DAILY
Status: DISCONTINUED | OUTPATIENT
Start: 2024-07-08 | End: 2024-07-08 | Stop reason: HOSPADM

## 2024-07-08 RX ORDER — IOPAMIDOL 408 MG/ML
INJECTION, SOLUTION INTRATHECAL PRN
Status: DISCONTINUED | OUTPATIENT
Start: 2024-07-08 | End: 2024-07-08 | Stop reason: ALTCHOICE

## 2024-07-08 RX ORDER — METHYLPHENIDATE HYDROCHLORIDE 10 MG/1
20 TABLET ORAL
Status: DISCONTINUED | OUTPATIENT
Start: 2024-07-08 | End: 2024-07-10 | Stop reason: HOSPADM

## 2024-07-08 RX ORDER — SODIUM CHLORIDE 0.9 % (FLUSH) 0.9 %
5-40 SYRINGE (ML) INJECTION EVERY 12 HOURS SCHEDULED
Status: CANCELLED | OUTPATIENT
Start: 2024-07-08

## 2024-07-08 RX ORDER — ENOXAPARIN SODIUM 100 MG/ML
40 INJECTION SUBCUTANEOUS DAILY
Status: DISCONTINUED | OUTPATIENT
Start: 2024-07-09 | End: 2024-07-10 | Stop reason: HOSPADM

## 2024-07-08 RX ORDER — NALOXONE HYDROCHLORIDE 0.4 MG/ML
INJECTION, SOLUTION INTRAMUSCULAR; INTRAVENOUS; SUBCUTANEOUS PRN
Status: DISCONTINUED | OUTPATIENT
Start: 2024-07-08 | End: 2024-07-10 | Stop reason: HOSPADM

## 2024-07-08 RX ORDER — ENOXAPARIN SODIUM 100 MG/ML
40 INJECTION SUBCUTANEOUS DAILY
OUTPATIENT
Start: 2024-07-09

## 2024-07-08 RX ORDER — PROPOFOL 10 MG/ML
INJECTION, EMULSION INTRAVENOUS PRN
Status: DISCONTINUED | OUTPATIENT
Start: 2024-07-08 | End: 2024-07-08 | Stop reason: SDUPTHER

## 2024-07-08 RX ORDER — ROCURONIUM BROMIDE 10 MG/ML
INJECTION, SOLUTION INTRAVENOUS PRN
Status: DISCONTINUED | OUTPATIENT
Start: 2024-07-08 | End: 2024-07-08 | Stop reason: SDUPTHER

## 2024-07-08 RX ORDER — CETIRIZINE HYDROCHLORIDE 10 MG/1
10 TABLET ORAL NIGHTLY
OUTPATIENT
Start: 2024-07-08

## 2024-07-08 RX ORDER — METOCLOPRAMIDE HYDROCHLORIDE 5 MG/ML
10 INJECTION INTRAMUSCULAR; INTRAVENOUS
Status: COMPLETED | OUTPATIENT
Start: 2024-07-08 | End: 2024-07-08

## 2024-07-08 RX ORDER — CETIRIZINE HYDROCHLORIDE 10 MG/1
10 TABLET ORAL NIGHTLY
Status: DISCONTINUED | OUTPATIENT
Start: 2024-07-08 | End: 2024-07-08 | Stop reason: HOSPADM

## 2024-07-08 RX ORDER — KETOROLAC TROMETHAMINE 30 MG/ML
30 INJECTION, SOLUTION INTRAMUSCULAR; INTRAVENOUS EVERY 6 HOURS PRN
Status: DISCONTINUED | OUTPATIENT
Start: 2024-07-08 | End: 2024-07-10 | Stop reason: HOSPADM

## 2024-07-08 RX ORDER — PROMETHAZINE HYDROCHLORIDE 12.5 MG/1
12.5 TABLET ORAL EVERY 6 HOURS PRN
OUTPATIENT
Start: 2024-07-08

## 2024-07-08 RX ORDER — SODIUM CHLORIDE 9 MG/ML
INJECTION, SOLUTION INTRAVENOUS CONTINUOUS
Status: DISCONTINUED | OUTPATIENT
Start: 2024-07-08 | End: 2024-07-09

## 2024-07-08 RX ORDER — ONDANSETRON 4 MG/1
4 TABLET, ORALLY DISINTEGRATING ORAL EVERY 8 HOURS PRN
Status: DISCONTINUED | OUTPATIENT
Start: 2024-07-08 | End: 2024-07-10 | Stop reason: HOSPADM

## 2024-07-08 RX ORDER — SODIUM CHLORIDE 9 MG/ML
INJECTION, SOLUTION INTRAVENOUS CONTINUOUS
Status: DISCONTINUED | OUTPATIENT
Start: 2024-07-08 | End: 2024-07-08 | Stop reason: HOSPADM

## 2024-07-08 RX ORDER — TRAZODONE HYDROCHLORIDE 50 MG/1
50 TABLET ORAL NIGHTLY
COMMUNITY

## 2024-07-08 RX ORDER — ALBUTEROL SULFATE 2.5 MG/3ML
2.5 SOLUTION RESPIRATORY (INHALATION) EVERY 6 HOURS PRN
Status: DISCONTINUED | OUTPATIENT
Start: 2024-07-08 | End: 2024-07-10 | Stop reason: HOSPADM

## 2024-07-08 RX ORDER — DOCUSATE SODIUM 100 MG/1
100 CAPSULE, LIQUID FILLED ORAL 2 TIMES DAILY
Status: DISCONTINUED | OUTPATIENT
Start: 2024-07-08 | End: 2024-07-08 | Stop reason: HOSPADM

## 2024-07-08 RX ORDER — BUPIVACAINE HYDROCHLORIDE 2.5 MG/ML
INJECTION, SOLUTION EPIDURAL; INFILTRATION; INTRACAUDAL PRN
Status: DISCONTINUED | OUTPATIENT
Start: 2024-07-08 | End: 2024-07-08 | Stop reason: SDUPTHER

## 2024-07-08 RX ORDER — DULOXETIN HYDROCHLORIDE 30 MG/1
60 CAPSULE, DELAYED RELEASE ORAL DAILY
OUTPATIENT
Start: 2024-07-09

## 2024-07-08 RX ORDER — SODIUM CHLORIDE, SODIUM LACTATE, POTASSIUM CHLORIDE, CALCIUM CHLORIDE 600; 310; 30; 20 MG/100ML; MG/100ML; MG/100ML; MG/100ML
INJECTION, SOLUTION INTRAVENOUS CONTINUOUS PRN
Status: DISCONTINUED | OUTPATIENT
Start: 2024-07-08 | End: 2024-07-08 | Stop reason: SDUPTHER

## 2024-07-08 RX ORDER — OXYCODONE HYDROCHLORIDE 5 MG/1
5 TABLET ORAL EVERY 6 HOURS PRN
OUTPATIENT
Start: 2024-07-08

## 2024-07-08 RX ORDER — TOPIRAMATE 25 MG/1
25 TABLET ORAL DAILY
Status: DISCONTINUED | OUTPATIENT
Start: 2024-07-08 | End: 2024-07-10 | Stop reason: HOSPADM

## 2024-07-08 RX ORDER — ONDANSETRON 2 MG/ML
4 INJECTION INTRAMUSCULAR; INTRAVENOUS EVERY 6 HOURS PRN
OUTPATIENT
Start: 2024-07-08

## 2024-07-08 RX ORDER — SODIUM CHLORIDE 0.9 % (FLUSH) 0.9 %
10 SYRINGE (ML) INJECTION EVERY 12 HOURS SCHEDULED
OUTPATIENT
Start: 2024-07-08

## 2024-07-08 RX ORDER — ROPINIROLE 2 MG/1
2 TABLET, FILM COATED ORAL NIGHTLY
Status: DISCONTINUED | OUTPATIENT
Start: 2024-07-08 | End: 2024-07-10 | Stop reason: HOSPADM

## 2024-07-08 RX ORDER — SODIUM CHLORIDE 0.9 % (FLUSH) 0.9 %
10 SYRINGE (ML) INJECTION PRN
OUTPATIENT
Start: 2024-07-08

## 2024-07-08 RX ORDER — LISINOPRIL 5 MG/1
5 TABLET ORAL
Status: DISCONTINUED | OUTPATIENT
Start: 2024-07-08 | End: 2024-07-10 | Stop reason: HOSPADM

## 2024-07-08 RX ORDER — ALPRAZOLAM 1 MG/1
1 TABLET ORAL 3 TIMES DAILY PRN
Status: DISCONTINUED | OUTPATIENT
Start: 2024-07-08 | End: 2024-07-10 | Stop reason: HOSPADM

## 2024-07-08 RX ORDER — METHYLPHENIDATE HYDROCHLORIDE 5 MG/1
20 TABLET ORAL 3 TIMES DAILY
OUTPATIENT
Start: 2024-07-08

## 2024-07-08 RX ORDER — METOCLOPRAMIDE HYDROCHLORIDE 5 MG/ML
5 INJECTION INTRAMUSCULAR; INTRAVENOUS EVERY 6 HOURS
Status: COMPLETED | OUTPATIENT
Start: 2024-07-08 | End: 2024-07-08

## 2024-07-08 RX ORDER — DULOXETIN HYDROCHLORIDE 30 MG/1
60 CAPSULE, DELAYED RELEASE ORAL DAILY
Status: DISCONTINUED | OUTPATIENT
Start: 2024-07-08 | End: 2024-07-08 | Stop reason: HOSPADM

## 2024-07-08 RX ORDER — FENTANYL CITRATE 0.05 MG/ML
50 INJECTION, SOLUTION INTRAMUSCULAR; INTRAVENOUS EVERY 10 MIN PRN
Status: DISCONTINUED | OUTPATIENT
Start: 2024-07-08 | End: 2024-07-09

## 2024-07-08 RX ORDER — LANOLIN ALCOHOL/MO/W.PET/CERES
400 CREAM (GRAM) TOPICAL DAILY
OUTPATIENT
Start: 2024-07-09

## 2024-07-08 RX ORDER — ALBUTEROL SULFATE 2.5 MG/3ML
2.5 SOLUTION RESPIRATORY (INHALATION) EVERY 6 HOURS PRN
Status: DISCONTINUED | OUTPATIENT
Start: 2024-07-08 | End: 2024-07-08 | Stop reason: HOSPADM

## 2024-07-08 RX ORDER — ACETAMINOPHEN 650 MG/1
650 SUPPOSITORY RECTAL EVERY 6 HOURS PRN
OUTPATIENT
Start: 2024-07-08

## 2024-07-08 RX ORDER — FENTANYL CITRATE 50 UG/ML
INJECTION, SOLUTION INTRAMUSCULAR; INTRAVENOUS PRN
Status: DISCONTINUED | OUTPATIENT
Start: 2024-07-08 | End: 2024-07-08 | Stop reason: SDUPTHER

## 2024-07-08 RX ORDER — ROPINIROLE 1 MG/1
2 TABLET, FILM COATED ORAL NIGHTLY
Status: DISCONTINUED | OUTPATIENT
Start: 2024-07-08 | End: 2024-07-08 | Stop reason: HOSPADM

## 2024-07-08 RX ORDER — SODIUM CHLORIDE 0.9 % (FLUSH) 0.9 %
5-40 SYRINGE (ML) INJECTION EVERY 12 HOURS SCHEDULED
Status: DISCONTINUED | OUTPATIENT
Start: 2024-07-08 | End: 2024-07-08 | Stop reason: HOSPADM

## 2024-07-08 RX ORDER — ALBUTEROL SULFATE 2.5 MG/3ML
2.5 SOLUTION RESPIRATORY (INHALATION) EVERY 6 HOURS PRN
OUTPATIENT
Start: 2024-07-08

## 2024-07-08 RX ORDER — IPRATROPIUM BROMIDE AND ALBUTEROL SULFATE 2.5; .5 MG/3ML; MG/3ML
1 SOLUTION RESPIRATORY (INHALATION) EVERY 6 HOURS
Status: DISCONTINUED | OUTPATIENT
Start: 2024-07-08 | End: 2024-07-08

## 2024-07-08 RX ADMIN — PANTOPRAZOLE SODIUM 40 MG: 40 INJECTION, POWDER, FOR SOLUTION INTRAVENOUS at 09:58

## 2024-07-08 RX ADMIN — BUPIVACAINE HYDROCHLORIDE 30 ML: 2.5 INJECTION, SOLUTION EPIDURAL; INFILTRATION; INTRACAUDAL at 14:35

## 2024-07-08 RX ADMIN — KETOROLAC TROMETHAMINE 30 MG: 30 INJECTION INTRAMUSCULAR; INTRAVENOUS at 22:22

## 2024-07-08 RX ADMIN — SODIUM CHLORIDE: 9 INJECTION, SOLUTION INTRAVENOUS at 17:41

## 2024-07-08 RX ADMIN — Medication 120 MG: at 14:54

## 2024-07-08 RX ADMIN — MIDAZOLAM HYDROCHLORIDE 2 MG: 1 INJECTION INTRAMUSCULAR; INTRAVENOUS at 14:30

## 2024-07-08 RX ADMIN — DEXAMETHASONE SODIUM PHOSPHATE 10 MG: 10 INJECTION INTRAMUSCULAR; INTRAVENOUS at 15:00

## 2024-07-08 RX ADMIN — ONDANSETRON 4 MG: 2 INJECTION INTRAMUSCULAR; INTRAVENOUS at 18:46

## 2024-07-08 RX ADMIN — SODIUM CHLORIDE: 9 INJECTION, SOLUTION INTRAVENOUS at 03:42

## 2024-07-08 RX ADMIN — ONDANSETRON 2 MG: 2 INJECTION INTRAMUSCULAR; INTRAVENOUS at 15:29

## 2024-07-08 RX ADMIN — METOCLOPRAMIDE HYDROCHLORIDE 5 MG: 5 INJECTION INTRAMUSCULAR; INTRAVENOUS at 08:28

## 2024-07-08 RX ADMIN — SODIUM CHLORIDE: 9 INJECTION, SOLUTION INTRAVENOUS at 10:02

## 2024-07-08 RX ADMIN — HYDROMORPHONE HYDROCHLORIDE 0.5 MG: 1 INJECTION, SOLUTION INTRAMUSCULAR; INTRAVENOUS; SUBCUTANEOUS at 15:55

## 2024-07-08 RX ADMIN — ALBUTEROL SULFATE 3 PUFF: 90 AEROSOL, METERED RESPIRATORY (INHALATION) at 15:35

## 2024-07-08 RX ADMIN — METOCLOPRAMIDE HYDROCHLORIDE 5 MG: 5 INJECTION INTRAMUSCULAR; INTRAVENOUS at 02:32

## 2024-07-08 RX ADMIN — ROCURONIUM BROMIDE 5 MG: 10 INJECTION, SOLUTION INTRAVENOUS at 14:54

## 2024-07-08 RX ADMIN — BUPIVACAINE HYDROCHLORIDE 30 ML: 2.5 INJECTION, SOLUTION EPIDURAL; INFILTRATION; INTRACAUDAL at 14:32

## 2024-07-08 RX ADMIN — ALPRAZOLAM 1 MG: 1 TABLET ORAL at 23:09

## 2024-07-08 RX ADMIN — METOCLOPRAMIDE HYDROCHLORIDE 10 MG: 5 INJECTION INTRAMUSCULAR; INTRAVENOUS at 20:45

## 2024-07-08 RX ADMIN — HYDROMORPHONE HYDROCHLORIDE 0.5 MG: 1 INJECTION, SOLUTION INTRAMUSCULAR; INTRAVENOUS; SUBCUTANEOUS at 16:07

## 2024-07-08 RX ADMIN — LIDOCAINE HYDROCHLORIDE 50 MG: 20 INJECTION, SOLUTION INTRAVENOUS at 14:54

## 2024-07-08 RX ADMIN — PROMETHAZINE HYDROCHLORIDE 12.5 MG: 12.5 TABLET ORAL at 13:13

## 2024-07-08 RX ADMIN — BUSPIRONE HYDROCHLORIDE 20 MG: 10 TABLET ORAL at 20:31

## 2024-07-08 RX ADMIN — ONDANSETRON 4 MG: 2 INJECTION INTRAMUSCULAR; INTRAVENOUS at 05:50

## 2024-07-08 RX ADMIN — FENTANYL CITRATE 50 MCG: 0.05 INJECTION, SOLUTION INTRAMUSCULAR; INTRAVENOUS at 16:58

## 2024-07-08 RX ADMIN — ROPINIROLE HYDROCHLORIDE 2 MG: 2 TABLET, FILM COATED ORAL at 20:31

## 2024-07-08 RX ADMIN — TOPIRAMATE 100 MG: 100 TABLET, FILM COATED ORAL at 20:32

## 2024-07-08 RX ADMIN — METOPROLOL TARTRATE 12.5 MG: 25 TABLET, FILM COATED ORAL at 20:30

## 2024-07-08 RX ADMIN — FENTANYL CITRATE 50 MCG: 0.05 INJECTION, SOLUTION INTRAMUSCULAR; INTRAVENOUS at 16:24

## 2024-07-08 RX ADMIN — DULOXETINE HYDROCHLORIDE 120 MG: 60 CAPSULE, DELAYED RELEASE ORAL at 20:30

## 2024-07-08 RX ADMIN — SUGAMMADEX 200 MG: 100 INJECTION, SOLUTION INTRAVENOUS at 15:39

## 2024-07-08 RX ADMIN — PROMETHAZINE HYDROCHLORIDE 25 MG: 25 INJECTION INTRAMUSCULAR; INTRAVENOUS at 08:26

## 2024-07-08 RX ADMIN — PROPOFOL 200 MG: 10 INJECTION, EMULSION INTRAVENOUS at 14:54

## 2024-07-08 RX ADMIN — MIDAZOLAM HYDROCHLORIDE 2 MG: 1 INJECTION, SOLUTION INTRAMUSCULAR; INTRAVENOUS at 14:35

## 2024-07-08 RX ADMIN — CEFAZOLIN 2000 MG: 1 INJECTION, POWDER, FOR SOLUTION INTRAMUSCULAR; INTRAVENOUS at 10:03

## 2024-07-08 RX ADMIN — HYDROMORPHONE HYDROCHLORIDE 1 MG: 1 INJECTION, SOLUTION INTRAMUSCULAR; INTRAVENOUS; SUBCUTANEOUS at 18:46

## 2024-07-08 RX ADMIN — Medication 400 MG: at 20:30

## 2024-07-08 RX ADMIN — TRAZODONE HYDROCHLORIDE 50 MG: 50 TABLET ORAL at 20:31

## 2024-07-08 RX ADMIN — SODIUM CHLORIDE, POTASSIUM CHLORIDE, SODIUM LACTATE AND CALCIUM CHLORIDE: 600; 310; 30; 20 INJECTION, SOLUTION INTRAVENOUS at 14:40

## 2024-07-08 RX ADMIN — ESMOLOL HYDROCHLORIDE 30 MG: 10 INJECTION, SOLUTION INTRAVENOUS at 15:14

## 2024-07-08 RX ADMIN — ROCURONIUM BROMIDE 45 MG: 10 INJECTION, SOLUTION INTRAVENOUS at 15:00

## 2024-07-08 RX ADMIN — CEFAZOLIN 2000 MG: 2 INJECTION, POWDER, FOR SOLUTION INTRAMUSCULAR; INTRAVENOUS at 15:00

## 2024-07-08 RX ADMIN — MIDAZOLAM HYDROCHLORIDE 2 MG: 1 INJECTION, SOLUTION INTRAMUSCULAR; INTRAVENOUS at 14:40

## 2024-07-08 RX ADMIN — ESMOLOL HYDROCHLORIDE 30 MG: 10 INJECTION, SOLUTION INTRAVENOUS at 15:21

## 2024-07-08 RX ADMIN — ESMOLOL HYDROCHLORIDE 20 MG: 10 INJECTION, SOLUTION INTRAVENOUS at 15:17

## 2024-07-08 RX ADMIN — FENTANYL CITRATE 100 MCG: 50 INJECTION, SOLUTION INTRAMUSCULAR; INTRAVENOUS at 14:42

## 2024-07-08 RX ADMIN — BENZTROPINE MESYLATE 2 MG: 0.5 TABLET ORAL at 20:31

## 2024-07-08 ASSESSMENT — PAIN SCALES - GENERAL
PAINLEVEL_OUTOF10: 8
PAINLEVEL_OUTOF10: 8
PAINLEVEL_OUTOF10: 5
PAINLEVEL_OUTOF10: 5
PAINLEVEL_OUTOF10: 4
PAINLEVEL_OUTOF10: 8
PAINLEVEL_OUTOF10: 5
PAINLEVEL_OUTOF10: 6
PAINLEVEL_OUTOF10: 6
PAINLEVEL_OUTOF10: 5
PAINLEVEL_OUTOF10: 7
PAINLEVEL_OUTOF10: 6
PAINLEVEL_OUTOF10: 4
PAINLEVEL_OUTOF10: 8
PAINLEVEL_OUTOF10: 8
PAINLEVEL_OUTOF10: 7
PAINLEVEL_OUTOF10: 4
PAINLEVEL_OUTOF10: 4
PAINLEVEL_OUTOF10: 5
PAINLEVEL_OUTOF10: 4
PAINLEVEL_OUTOF10: 8
PAINLEVEL_OUTOF10: 5
PAINLEVEL_OUTOF10: 6
PAINLEVEL_OUTOF10: 4
PAINLEVEL_OUTOF10: 0

## 2024-07-08 ASSESSMENT — PAIN DESCRIPTION - LOCATION
LOCATION: ABDOMEN

## 2024-07-08 ASSESSMENT — PAIN - FUNCTIONAL ASSESSMENT
PAIN_FUNCTIONAL_ASSESSMENT: PREVENTS OR INTERFERES SOME ACTIVE ACTIVITIES AND ADLS
PAIN_FUNCTIONAL_ASSESSMENT: ACTIVITIES ARE NOT PREVENTED
PAIN_FUNCTIONAL_ASSESSMENT: PREVENTS OR INTERFERES SOME ACTIVE ACTIVITIES AND ADLS
PAIN_FUNCTIONAL_ASSESSMENT: PREVENTS OR INTERFERES SOME ACTIVE ACTIVITIES AND ADLS

## 2024-07-08 ASSESSMENT — PAIN DESCRIPTION - ORIENTATION
ORIENTATION: MID

## 2024-07-08 ASSESSMENT — PAIN DESCRIPTION - DESCRIPTORS
DESCRIPTORS: SHARP

## 2024-07-08 ASSESSMENT — PAIN DESCRIPTION - PAIN TYPE
TYPE: SURGICAL PAIN
TYPE: SURGICAL PAIN

## 2024-07-08 NOTE — H&P
[facility] called and transfer initiated with rTaci. DX: Cholecystitis. Dr VIDA Patel accepting.

## 2024-07-08 NOTE — PROGRESS NOTES
Patient arrives to room 210 in w/c accompanied by nurse. Transfers self from w/c to bed. Patient is a little weak and states she has h/o falls d/t syncope. Patient educated to call for assist OOB and bed alarm is on. A&O x 4. Pleasant and cooperative. Respirations even and unlabored. IVF infusing as ordered. Continues to have nausea, but no vomiting since arrival. Please see flowsheets for complete assessment.

## 2024-07-08 NOTE — ANESTHESIA PRE PROCEDURE
(If Applicable):  No results found for: \"LABABO\"    Drug/Infectious Status (If Applicable):  No results found for: \"HIV\", \"HEPCAB\"    COVID-19 Screening (If Applicable):   Lab Results   Component Value Date/Time    COVID19 Not Detected 07/07/2024 08:40 PM           Anesthesia Evaluation  Patient summary reviewed and Nursing notes reviewed   no history of anesthetic complications:   Airway: Mallampati: II  TM distance: >3 FB   Neck ROM: full  Mouth opening: > = 3 FB   Dental: normal exam         Pulmonary:normal exam    (+)  COPD:          asthma:                            Cardiovascular:    (+) hypertension:                  Neuro/Psych:               GI/Hepatic/Renal: Neg GI/Hepatic/Renal ROS            Endo/Other: Negative Endo/Other ROS                    Abdominal: normal exam            Vascular:          Other Findings:       Anesthesia Plan      general     ASA 3       Induction: intravenous.    MIPS: Prophylactic antiemetics administered.  Anesthetic plan and risks discussed with patient.      Plan discussed with CRNA.    Attending anesthesiologist reviewed and agrees with Preprocedure content      Post-op pain plan if not by surgeon: single peripheral nerve block        Eric Hall MD   7/8/2024

## 2024-07-08 NOTE — PROGRESS NOTES
Pt a and o x4. Pleasant. Currently denies pain. C/o nausea and dry heaving this AM around 0730. MD made aware. New rx for phenergan IM. Phenergan IM given in right deltoid with positive results. US completed this AM. See results. Pt unable to tolerate PO meds at this time. States she feels that she will vomit if takes them. PO meds held for now. Plan is to tx pt to [facility] for possible surgery. Pt made NPO at this time. Lovenox held. Pt aware. Awaiting bed assignment still. NS infusing at 150ml/hr. Handoff report given to Corazon SMART at 1015. Call light in reach.

## 2024-07-08 NOTE — ANESTHESIA PROCEDURE NOTES
Peripheral Block    Patient location during procedure: pre-op  Reason for block: post-op pain management and at surgeon's request  Start time: 7/8/2024 2:30 PM  Staffing  Performed: anesthesiologist   Anesthesiologist: Eric Hall MD  Performed by: Eric Hall MD  Authorized by: Eric Hall MD    Preanesthetic Checklist  Completed: patient identified, IV checked, site marked, risks and benefits discussed, surgical/procedural consents, equipment checked, pre-op evaluation, timeout performed, anesthesia consent given, oxygen available and monitors applied/VS acknowledged  Peripheral Block   Patient position: supine  Prep: ChloraPrep  Provider prep: mask and sterile gloves (Sterile probe cover)  Patient monitoring: cardiac monitor, continuous pulse ox, frequent blood pressure checks and IV access  Block type: TAP  Laterality: bilateral  Injection technique: single-shot  Guidance: ultrasound guided  Local infiltration: bupivacaine  Infiltration strength: 0.25 %  Local infiltration: bupivacaine  Dose: 30 mLDose: 30 mL    Needle   Needle type: combined needle/nerve stimulator   Needle gauge: 22 G  Needle localization: anatomical landmarks and ultrasound guidance  Test dose: negative  Needle length: 5 cm  Assessment   Injection assessment: negative aspiration for heme, no paresthesia on injection and local visualized surrounding nerve on ultrasound  Paresthesia pain: immediately resolved  Slow fractionated injection: yes  Hemodynamics: stable  Outcomes: uncomplicated and patient tolerated procedure well    Additional Notes  Ultrasound guidance used to view needle for placement.    Ultrasound image stored,  printed and saved in patient chart.    Sterile probe cover used

## 2024-07-08 NOTE — ED NOTES
Pt requesting a COVID test, stating her parents are elderly and her dad was asking if she was tested,   Doc aware, orders received

## 2024-07-08 NOTE — H&P
bruising or bleeding  DATA:    CBC:   Lab Results   Component Value Date/Time    WBC 10.3 07/08/2024 05:54 AM    RBC 4.96 07/08/2024 05:54 AM    RBC 4.37 02/15/2018 05:05 AM    HGB 15.2 07/08/2024 05:54 AM    HCT 45.0 07/08/2024 05:54 AM    MCV 90.7 07/08/2024 05:54 AM    MCH 30.6 07/08/2024 05:54 AM    MCHC 33.8 07/08/2024 05:54 AM    RDW 13.2 07/08/2024 05:54 AM     07/08/2024 05:54 AM    MPV 9.8 02/15/2018 05:05 AM     CMP:    Lab Results   Component Value Date/Time     07/08/2024 05:54 AM    K 3.8 07/08/2024 05:54 AM     07/08/2024 05:54 AM    CO2 18 07/08/2024 05:54 AM    BUN 11 07/08/2024 05:54 AM    CREATININE 0.43 07/08/2024 05:54 AM    GFRAA >60.0 09/18/2021 12:39 PM    AGRATIO 1.6 02/15/2018 05:05 AM    LABGLOM >90.0 07/08/2024 05:54 AM    LABGLOM >60.0 12/31/2023 07:53 PM    GLUCOSE 74 07/08/2024 05:54 AM    GLUCOSE 123 02/15/2018 05:05 AM    CALCIUM 8.4 07/08/2024 05:54 AM    BILITOT 1.2 07/08/2024 05:54 AM    ALKPHOS 57 07/08/2024 05:54 AM    AST 56 07/08/2024 05:54 AM    ALT 84 07/08/2024 05:54 AM     Radiology Review: CT of the abdomen and right upper quadrant ultrasound    IMPRESSION/RECOMMENDATIONS:      Acute cholecystitis    I discussed the risks and benefits of laparoscopic, possible open cholecystectomy for definitive surgical treatment and relief of abdominal pain.    Risks of the procedure given including infection, bleeding, damage to the common bile duct, bile leak reoperation and failure to relieve symptoms    Patient understands the risks and benefits.  She understands nonoperative treatment.    Despite the risk, she wishes to proceed.  Consent obtained for laparoscopic possible open cholecystectomy with cholangiogram.    Ancef given preoperatively

## 2024-07-08 NOTE — PLAN OF CARE
Problem: Safety - Adult  Goal: Free from fall injury  Outcome: Progressing     Problem: Gastrointestinal - Adult  Goal: Minimal or absence of nausea and vomiting  Outcome: Progressing  Goal: Maintains adequate nutritional intake  Outcome: Progressing     Problem: Gastrointestinal - Adult  Goal: Maintains adequate nutritional intake  Outcome: Progressing     Problem: Metabolic/Fluid and Electrolytes - Adult  Goal: Electrolytes maintained within normal limits  Outcome: Progressing

## 2024-07-08 NOTE — ANESTHESIA POSTPROCEDURE EVALUATION
Department of Anesthesiology  Postprocedure Note    Patient: Isis Corral  MRN: [id number]  YOB: 1970  Date of evaluation: 7/8/2024    Procedure Summary       Date: 07/08/24 Room / Location: [address]    Anesthesia Start: 1440 Anesthesia Stop: 1551    Procedure: LAPAROSCOPIC POSSIBLE OPEN  CHOLECYSTECTOMY WITH CHOLANGIOGRAM Diagnosis:       Acute cholecystitis      (Acute cholecystitis [K81.0])    Surgeons: Jeramie Merrill MD Responsible Provider: Eric Hall MD    Anesthesia Type: general ASA Status: 3            Anesthesia Type: No value filed.    Jocelyn Phase I: Jocelyn Score: 10    Jocelyn Phase II:      Anesthesia Post Evaluation    Patient location during evaluation: bedside  Patient participation: complete - patient participated  Level of consciousness: awake and awake and alert  Pain score: 2  Airway patency: patent  Nausea & Vomiting: no nausea and no vomiting  Cardiovascular status: blood pressure returned to baseline and hemodynamically stable  Respiratory status: acceptable  Hydration status: euvolemic  Pain management: adequate        No notable events documented.

## 2024-07-08 NOTE — CONSULTS
Physician Progress Note    7/8/2024   7:23 PM    Name:  Isis Corral  MRN:    [id number]     IP Day: 1     Admit Date: 7/8/2024  1:59 PM  PCP: Ras Mariano MD    Code Status:  Full Code    Subjective:     Consult requested by Dr. Merrill for medical management of anxiety, hypertension:    Patient is having some abdominal discomfort following her laparoscopic cholecystectomy.  She reports history of asthma, COPD, autonomic dysfunction, ADHD, depression, anxiety, hypertension.  She is a former smoker and quit 1 week ago.  She also admits to history of alcohol use disorder but has been alcohol free for the last 4 years.  She denies any illicit drug use.    Current Facility-Administered Medications   Medication Dose Route Frequency Provider Last Rate Last Admin    naloxone 0.4 mg in 10 mL sodium chloride syringe   IntraVENous PRN Jeramie Merrill MD        sodium chloride flush 0.9 % injection 5-40 mL  5-40 mL IntraVENous 2 times per day Jeramie Merrill MD        sodium chloride flush 0.9 % injection 5-40 mL  5-40 mL IntraVENous PRN Jeramie Merrill MD        0.9 % sodium chloride infusion   IntraVENous PRN Jeramie Merrill MD        meperidine (DEMEROL) injection 12.5 mg  12.5 mg IntraVENous Once PRN Jeramie Merrill MD        fentaNYL (SUBLIMAZE) injection 50 mcg  50 mcg IntraVENous Q10 Min PRN Jeramie Merrill MD   50 mcg at 07/08/24 1658    HYDROmorphone (DILAUDID) injection 0.5 mg  0.5 mg IntraVENous Q10 Min PRN Jeramie Merrill MD   0.5 mg at 07/08/24 1607    oxyCODONE (ROXICODONE) immediate release tablet 5 mg  5 mg Oral Once PRN Jeramie Merrill MD        ondansetron (ZOFRAN) injection 4 mg  4 mg IntraVENous Once PRN Jeramie Merrill MD        metoclopramide (REGLAN) injection 10 mg  10 mg IntraVENous Once PRN Jeramie Merrill MD        diphenhydrAMINE (BENADRYL) injection 12.5 mg  12.5 mg IntraVENous Once PRN Jeramie Merrill MD

## 2024-07-08 NOTE — BRIEF OP NOTE
Brief Postoperative Note      Patient: Isis Corral  YOB: 1970  MRN: [id number]    Date of Procedure: 7/8/2024    Pre-Op Diagnosis Codes:     * Acute cholecystitis [K81.0]    Post-Op Diagnosis: Same       Procedure: Laparoscopic cholecystectomy with cholangiogram    Surgeon(s):  Jeramie Merrill MD    Assistant:  Ms. Gino    Anesthesia: General    Estimated Blood Loss (mL): 25    Complications: None    Specimens:   ID Type Source Tests Collected by Time Destination   A : GALLBLADDER Tissue Gallbladder SURGICAL PATHOLOGY Jeramie Merrill MD 7/8/2024 1525        Implants:  * No implants in log *      Drains: None  NG/OG/NJ/NE Tube Orogastric 16 fr Center mouth (Active)           Electronically signed by JERAMIE MERRILL MD on 7/8/2024 at 3:53 PM

## 2024-07-08 NOTE — H&P
Hospital Medicine History & Physical      PCP: Ras Mariano MD    Date of Admission: 7/7/2024    Date of Service: 7/8/24      Chief Complaint:  abd pain/nausea/intractable vomiting       History Of Present Illness:  53 y.o. female who presented to [facility] with above complains. Patient was doing well until 4 days ago when she developed nausea, intractable vomiting multiple times a day, epigastric and RUQ discomfort/pain. Denied diarrhea, had 2 BM yesterday. Not able to hydrate herself at home and eventually came to ER yesterday. After initial stabilization was admitted for further management   Denied fever/cough/dizziness    Past Medical History:          Diagnosis Date    ADHD     Anxiety     Asthma     COPD (chronic obstructive pulmonary disease) ([facility])     Depression     Fibromyalgia     History of syncope 7/29/2022    Migraine     Primary hypertension 7/29/2022    Syncope and collapse        Past Surgical History:          Procedure Laterality Date    ANKLE FRACTURE SURGERY Left 10/9/2019    LEFT OPEN REDUCTION INTERNAL FIXATION MALLEOLUS FRACTURE performed by Wilver Rm DO at [facility] [place]    BACK SURGERY      KNEE SURGERY Right     2012    OTHER SURGICAL HISTORY      3 VAGINAL BIRTHS    TUBAL LIGATION      ENDOMETRIOSIS       Medications Prior to Admission:      Prior to Admission medications    Medication Sig Start Date End Date Taking? Authorizing Provider   SUMAtriptan (IMITREX) 6 MG/0.5ML SOLN injection Inject 0.5 mLs into the skin once as needed for Migraine   Yes ProviderMonique MD   benztropine (COGENTIN) 2 MG tablet Take 1 tablet by mouth 2 times daily 7/20/23   Monique Byrne MD   rOPINIRole (REQUIP) 2 MG tablet Take 1 tablet by mouth nightly 7/20/23   Monique Byrne MD   temazepam (RESTORIL) 15 MG capsule  7/20/23   Monique Byrne MD   docusate sodium (COLACE) 100 MG capsule Take 1 capsule by mouth 2 times daily 7/20/23   Monique Byrne MD   magnesium

## 2024-07-09 LAB
ALBUMIN SERPL-MCNC: 3.5 G/DL (ref 3.5–4.6)
ALP SERPL-CCNC: 49 U/L (ref 40–130)
ALT SERPL-CCNC: 90 U/L (ref 0–33)
ANION GAP SERPL CALCULATED.3IONS-SCNC: 12 MEQ/L (ref 9–15)
AST SERPL-CCNC: 50 U/L (ref 0–35)
BILIRUB SERPL-MCNC: 0.6 MG/DL (ref 0.2–0.7)
BUN SERPL-MCNC: 7 MG/DL (ref 6–20)
CALCIUM SERPL-MCNC: 8.2 MG/DL (ref 8.5–9.9)
CHLORIDE SERPL-SCNC: 107 MEQ/L (ref 95–107)
CO2 SERPL-SCNC: 17 MEQ/L (ref 20–31)
CREAT SERPL-MCNC: 0.43 MG/DL (ref 0.5–0.9)
ERYTHROCYTE [DISTWIDTH] IN BLOOD BY AUTOMATED COUNT: 13.2 % (ref 11.5–14.5)
GLOBULIN SER CALC-MCNC: 2.3 G/DL (ref 2.3–3.5)
GLUCOSE SERPL-MCNC: 106 MG/DL (ref 70–99)
HCT VFR BLD AUTO: 40 % (ref 37–47)
HGB BLD-MCNC: 13.3 G/DL (ref 12–16)
MCH RBC QN AUTO: 30.4 PG (ref 27–31.3)
MCHC RBC AUTO-ENTMCNC: 33.3 % (ref 33–37)
MCV RBC AUTO: 91.3 FL (ref 79.4–94.8)
PLATELET # BLD AUTO: 315 K/UL (ref 130–400)
POTASSIUM SERPL-SCNC: 4.4 MEQ/L (ref 3.4–4.9)
PROT SERPL-MCNC: 5.8 G/DL (ref 6.3–8)
RBC # BLD AUTO: 4.38 M/UL (ref 4.2–5.4)
SODIUM SERPL-SCNC: 136 MEQ/L (ref 135–144)
WBC # BLD AUTO: 10.5 K/UL (ref 4.8–10.8)

## 2024-07-09 PROCEDURE — 6370000000 HC RX 637 (ALT 250 FOR IP): Performed by: INTERNAL MEDICINE

## 2024-07-09 PROCEDURE — G0378 HOSPITAL OBSERVATION PER HR: HCPCS

## 2024-07-09 PROCEDURE — 94150 VITAL CAPACITY TEST: CPT

## 2024-07-09 PROCEDURE — 96374 THER/PROPH/DIAG INJ IV PUSH: CPT

## 2024-07-09 PROCEDURE — 6360000002 HC RX W HCPCS: Performed by: COLON & RECTAL SURGERY

## 2024-07-09 PROCEDURE — 80053 COMPREHEN METABOLIC PANEL: CPT

## 2024-07-09 PROCEDURE — 85027 COMPLETE CBC AUTOMATED: CPT

## 2024-07-09 PROCEDURE — 36415 COLL VENOUS BLD VENIPUNCTURE: CPT

## 2024-07-09 PROCEDURE — 2700000000 HC OXYGEN THERAPY PER DAY

## 2024-07-09 PROCEDURE — 2580000003 HC RX 258: Performed by: COLON & RECTAL SURGERY

## 2024-07-09 PROCEDURE — 99024 POSTOP FOLLOW-UP VISIT: CPT | Performed by: COLON & RECTAL SURGERY

## 2024-07-09 PROCEDURE — 96376 TX/PRO/DX INJ SAME DRUG ADON: CPT

## 2024-07-09 RX ORDER — ACETAMINOPHEN 80 MG
TABLET,CHEWABLE ORAL ONCE
Status: DISCONTINUED | OUTPATIENT
Start: 2024-07-09 | End: 2024-07-10 | Stop reason: HOSPADM

## 2024-07-09 RX ADMIN — DULOXETINE HYDROCHLORIDE 120 MG: 60 CAPSULE, DELAYED RELEASE ORAL at 08:37

## 2024-07-09 RX ADMIN — TOPIRAMATE 100 MG: 100 TABLET, FILM COATED ORAL at 22:28

## 2024-07-09 RX ADMIN — Medication 10 ML: at 22:31

## 2024-07-09 RX ADMIN — KETOROLAC TROMETHAMINE 30 MG: 30 INJECTION INTRAMUSCULAR; INTRAVENOUS at 19:38

## 2024-07-09 RX ADMIN — TOPIRAMATE 100 MG: 100 TABLET, FILM COATED ORAL at 08:36

## 2024-07-09 RX ADMIN — SODIUM CHLORIDE: 9 INJECTION, SOLUTION INTRAVENOUS at 06:43

## 2024-07-09 RX ADMIN — BENZTROPINE MESYLATE 2 MG: 0.5 TABLET ORAL at 22:23

## 2024-07-09 RX ADMIN — KETOROLAC TROMETHAMINE 30 MG: 30 INJECTION INTRAMUSCULAR; INTRAVENOUS at 06:32

## 2024-07-09 RX ADMIN — Medication 400 MG: at 08:39

## 2024-07-09 RX ADMIN — LISINOPRIL 5 MG: 5 TABLET ORAL at 12:13

## 2024-07-09 RX ADMIN — BUSPIRONE HYDROCHLORIDE 20 MG: 10 TABLET ORAL at 14:37

## 2024-07-09 RX ADMIN — METOPROLOL TARTRATE 12.5 MG: 25 TABLET, FILM COATED ORAL at 08:37

## 2024-07-09 RX ADMIN — TRAZODONE HYDROCHLORIDE 50 MG: 50 TABLET ORAL at 22:28

## 2024-07-09 RX ADMIN — BENZTROPINE MESYLATE 2 MG: 0.5 TABLET ORAL at 10:08

## 2024-07-09 RX ADMIN — ROPINIROLE HYDROCHLORIDE 2 MG: 2 TABLET, FILM COATED ORAL at 22:22

## 2024-07-09 RX ADMIN — SODIUM CHLORIDE, PRESERVATIVE FREE 10 ML: 5 INJECTION INTRAVENOUS at 19:38

## 2024-07-09 RX ADMIN — BUSPIRONE HYDROCHLORIDE 20 MG: 10 TABLET ORAL at 08:39

## 2024-07-09 ASSESSMENT — PAIN SCALES - GENERAL
PAINLEVEL_OUTOF10: 3
PAINLEVEL_OUTOF10: 0
PAINLEVEL_OUTOF10: 6
PAINLEVEL_OUTOF10: 5

## 2024-07-09 ASSESSMENT — PAIN DESCRIPTION - PAIN TYPE: TYPE: SURGICAL PAIN

## 2024-07-09 ASSESSMENT — PAIN DESCRIPTION - ORIENTATION: ORIENTATION: RIGHT;UPPER

## 2024-07-09 ASSESSMENT — PAIN DESCRIPTION - LOCATION: LOCATION: ABDOMEN

## 2024-07-09 ASSESSMENT — PAIN DESCRIPTION - DESCRIPTORS: DESCRIPTORS: DISCOMFORT;SHARP

## 2024-07-09 NOTE — PLAN OF CARE
Problem: Discharge Planning  Goal: Discharge to home or other facility with appropriate resources  7/9/2024 0418 by Meli Whitman RN  Outcome: Progressing  Flowsheets (Taken 7/8/2024 1806 by Bety Portillo, RN)  Discharge to home or other facility with appropriate resources:   Identify barriers to discharge with patient and caregiver   Identify discharge learning needs (meds, wound care, etc)   Refer to discharge planning if patient needs post-hospital services based on physician order or complex needs related to functional status, cognitive ability or social support system   Arrange for needed discharge resources and transportation as appropriate  7/8/2024 1749 by Bety Portillo, RN  Outcome: Progressing     Problem: Pain  Goal: Verbalizes/displays adequate comfort level or baseline comfort level  7/9/2024 0418 by Meli Whitman RN  Outcome: Progressing  7/8/2024 1749 by Bety Portillo, RN  Outcome: Progressing     Problem: Safety - Adult  Goal: Free from fall injury  7/9/2024 0418 by Meli Whitman RN  Outcome: Progressing  7/8/2024 1749 by Bety Portillo RN  Outcome: Progressing

## 2024-07-09 NOTE — PROGRESS NOTES
Physician Progress Note    7/9/2024   3:33 PM    Name:  Isis Corral  MRN:    [id number]     IP Day: 0     Admit Date: 7/8/2024  1:59 PM  PCP: Ras Mariano MD    Code Status:  Full Code    Subjective:     Abdominal pain is better.    Current Facility-Administered Medications   Medication Dose Route Frequency Provider Last Rate Last Admin    naloxone 0.4 mg in 10 mL sodium chloride syringe   IntraVENous PRN Jeramie Merrill MD        sodium chloride flush 0.9 % injection 5-40 mL  5-40 mL IntraVENous 2 times per day Jeramie Merrill MD        sodium chloride flush 0.9 % injection 5-40 mL  5-40 mL IntraVENous PRN Jeramie Merrill MD        0.9 % sodium chloride infusion   IntraVENous PRN Jeramie Merrill MD        ondansetron (ZOFRAN) injection 4 mg  4 mg IntraVENous Once PRN Jeramie Merrill MD        diphenhydrAMINE (BENADRYL) injection 12.5 mg  12.5 mg IntraVENous Once PRN Jeramie Merrill MD        ALPRAZolam (XANAX) tablet 1 mg  1 mg Oral TID PRN Jeramie Merrill MD   1 mg at 07/08/24 2309    topiramate (TOPAMAX) tablet 25 mg  25 mg Oral Daily Jeramie Merrill MD        sodium chloride flush 0.9 % injection 5-40 mL  5-40 mL IntraVENous 2 times per day Jeramie Merrill MD        sodium chloride flush 0.9 % injection 5-40 mL  5-40 mL IntraVENous PRN Jeramie Merrill MD        0.9 % sodium chloride infusion   IntraVENous PRN Jeramie Merrill MD        ondansetron (ZOFRAN-ODT) disintegrating tablet 4 mg  4 mg Oral Q8H PRN Jeramie Merrill MD        Or    ondansetron (ZOFRAN) injection 4 mg  4 mg IntraVENous Q6H PRN Jeramie Merrill MD   4 mg at 07/08/24 1846    enoxaparin (LOVENOX) injection 40 mg  40 mg SubCUTAneous Daily Jeramie Merrill MD        ketorolac (TORADOL) injection 30 mg  30 mg IntraVENous Q6H PRN Jeramie Merrill MD   30 mg at 07/09/24 0632    albuterol (PROVENTIL) (2.5 MG/3ML) 0.083% nebulizer solution 2.5 mg

## 2024-07-09 NOTE — PROGRESS NOTES
Patient requires to stay overnight because of a foggy sensation she has since restarting her psychiatric medications.    The plan is to continue on full liquids for the next 2 to 3 days    Her home medications have been restarted    Patient requires no IV fluids or IV pain control    Discharge tomorrow morning

## 2024-07-09 NOTE — PROGRESS NOTES
Postop day #1 laparoscopic cholecystectomy with cholangiogram    Patient tolerating liquids.  Pain controlled.    She has reservations about going home today.  I have encouraged her to get out of bed.  IV fluids were hep-locked.  She will be advanced to a full liquid diet which she will stay on for the next few days.    I will reevaluate this afternoon regarding discharge.  She is easily capable if she can get over her concerns regarding going home.

## 2024-07-09 NOTE — PLAN OF CARE
Problem: Discharge Planning  Goal: Discharge to home or other facility with appropriate resources  7/9/2024 1027 by Bety Portillo RN  Outcome: Progressing  7/9/2024 0418 by Meli Whitman RN  Outcome: Progressing  Flowsheets (Taken 7/8/2024 1806 by Bety Portillo, RN)  Discharge to home or other facility with appropriate resources:   Identify barriers to discharge with patient and caregiver   Identify discharge learning needs (meds, wound care, etc)   Refer to discharge planning if patient needs post-hospital services based on physician order or complex needs related to functional status, cognitive ability or social support system   Arrange for needed discharge resources and transportation as appropriate     Problem: Pain  Goal: Verbalizes/displays adequate comfort level or baseline comfort level  7/9/2024 1027 by Bety Portillo RN  Outcome: Progressing  7/9/2024 0418 by Meli Whitman RN  Outcome: Progressing     Problem: Safety - Adult  Goal: Free from fall injury  7/9/2024 1027 by Bety Portillo RN  Outcome: Progressing  7/9/2024 0418 by Meli Whitman RN  Outcome: Progressing

## 2024-07-10 VITALS
HEART RATE: 62 BPM | BODY MASS INDEX: 24.99 KG/M2 | RESPIRATION RATE: 18 BRPM | HEIGHT: 66 IN | WEIGHT: 155.5 LBS | DIASTOLIC BLOOD PRESSURE: 65 MMHG | TEMPERATURE: 97.7 F | OXYGEN SATURATION: 96 % | SYSTOLIC BLOOD PRESSURE: 119 MMHG

## 2024-07-10 PROCEDURE — 6370000000 HC RX 637 (ALT 250 FOR IP): Performed by: INTERNAL MEDICINE

## 2024-07-10 PROCEDURE — 99024 POSTOP FOLLOW-UP VISIT: CPT | Performed by: COLON & RECTAL SURGERY

## 2024-07-10 PROCEDURE — G0378 HOSPITAL OBSERVATION PER HR: HCPCS

## 2024-07-10 PROCEDURE — 6360000002 HC RX W HCPCS: Performed by: COLON & RECTAL SURGERY

## 2024-07-10 PROCEDURE — 2580000003 HC RX 258: Performed by: COLON & RECTAL SURGERY

## 2024-07-10 PROCEDURE — 96376 TX/PRO/DX INJ SAME DRUG ADON: CPT

## 2024-07-10 RX ORDER — OXYCODONE HYDROCHLORIDE AND ACETAMINOPHEN 5; 325 MG/1; MG/1
1 TABLET ORAL EVERY 6 HOURS PRN
Qty: 12 TABLET | Refills: 0 | Status: SHIPPED | OUTPATIENT
Start: 2024-07-10 | End: 2024-07-13

## 2024-07-10 RX ADMIN — LISINOPRIL 5 MG: 5 TABLET ORAL at 12:05

## 2024-07-10 RX ADMIN — DULOXETINE HYDROCHLORIDE 120 MG: 60 CAPSULE, DELAYED RELEASE ORAL at 09:21

## 2024-07-10 RX ADMIN — BUSPIRONE HYDROCHLORIDE 20 MG: 10 TABLET ORAL at 09:21

## 2024-07-10 RX ADMIN — Medication 10 ML: at 09:22

## 2024-07-10 RX ADMIN — Medication 400 MG: at 09:21

## 2024-07-10 RX ADMIN — TOPIRAMATE 100 MG: 100 TABLET, FILM COATED ORAL at 09:21

## 2024-07-10 RX ADMIN — KETOROLAC TROMETHAMINE 30 MG: 30 INJECTION INTRAMUSCULAR; INTRAVENOUS at 12:05

## 2024-07-10 RX ADMIN — SODIUM CHLORIDE, PRESERVATIVE FREE 10 ML: 5 INJECTION INTRAVENOUS at 09:24

## 2024-07-10 RX ADMIN — METOPROLOL TARTRATE 12.5 MG: 25 TABLET, FILM COATED ORAL at 09:20

## 2024-07-10 RX ADMIN — BENZTROPINE MESYLATE 2 MG: 0.5 TABLET ORAL at 09:21

## 2024-07-10 ASSESSMENT — PAIN DESCRIPTION - PAIN TYPE: TYPE: SURGICAL PAIN

## 2024-07-10 ASSESSMENT — PAIN SCALES - GENERAL
PAINLEVEL_OUTOF10: 0
PAINLEVEL_OUTOF10: 7

## 2024-07-10 NOTE — DISCHARGE SUMMARY
Physician Discharge Summary     Patient ID:  Isis Corral  [id number]  53 y.o.  1970    Admit date: 7/8/2024    Discharge date and time: 7/10/2024    Admitting Physician: Jeramie Merrill MD     Discharge Physician: Same    Admission Diagnoses: Acute cholecystitis [K81.0]  Cholecystitis [K81.9]    Discharge Diagnoses: Same    Admission Condition: poor    Discharged Condition: good    Indication for Admission: Acute cholecystitis    Hospital Course: Patient was admitted to the hospital on 7/8/2024, transferred from [facility] for suspected gallbladder disease.  The details of the history can be found in the history and physical.    She was taken that day to the operating room for a laparoscopic cholecystectomy with cholangiogram, the details of which can be found in the operative report.    The hospitalist service was consulted for assistance with medical comorbidities.  The details of their consult can be found in the medical record.    She was transferred to the floor postoperatively.  Pain control was given.  She was started on liquids and advance to a full liquid diet.    On postop day #2 she was tolerating a diet.  She has returned to all her home medications.    She ambulated and was doing well.    Discharge instructions were given at the bedside regarding activity, medication, follow-up and wound care.  She will see me in the office next week.  All questions answered.    Consults: Hospitalist    Significant Diagnostic Studies: labs: CBC, BMP    Treatments: IV hydration, antibiotics: Ancef, and surgery: Laparoscopic cholecystectomy with cholangiogram    Discharge Exam:  Abdomen soft, sclera anicteric, heart regular rate and rhythm lungs are clear, patient awake alert and oriented    Disposition: home    In process/preliminary results:  Outstanding Order Results       Date and Time Order Name Status Description    7/8/2024  9:31 AM Surgical Pathology In process     7/8/2024  5:54 AM

## 2024-07-10 NOTE — PROGRESS NOTES
Postop day #2 laparoscopic cholecystectomy with cholangiogram    Patient tolerating diet without issues.  Pain controlled without medication.    Abdomen soft and nondistended.    Plan for discharge today.  All questions answered

## 2024-07-10 NOTE — DISCHARGE INSTRUCTIONS
May remove Band-Aids    Steri-Strips until office    May shower    Pain medication prescription available if needed    No lifting greater than 10 pounds for the next 2 weeks    May take stairs

## 2024-07-10 NOTE — PROGRESS NOTES
Physician Progress Note    7/10/2024   3:07 PM    Name:  Isis Corral  MRN:    [id number]     IP Day: 0     Admit Date: 7/8/2024  1:59 PM  PCP: Ras Mariano MD    Code Status:  Prior    Subjective:   Improved.  Going home today    No current facility-administered medications for this encounter.     Current Outpatient Medications   Medication Sig Dispense Refill    oxyCODONE-acetaminophen (PERCOCET) 5-325 MG per tablet Take 1 tablet by mouth every 6 hours as needed for Pain for up to 3 days. Intended supply: 3 days. Take lowest dose possible to manage pain Max Daily Amount: 4 tablets 12 tablet 0    ALPRAZolam (XANAX) 1 MG tablet Take 1 tablet by mouth 3 times daily. And before bedtime Max Daily Amount: 3 mg      traZODone (DESYREL) 50 MG tablet Take 1 tablet by mouth nightly 1-2 tablets at bedtime      SUMAtriptan (IMITREX) 6 MG/0.5ML SOLN injection Inject 0.5 mLs into the skin once as needed for Migraine      benztropine (COGENTIN) 2 MG tablet Take 1 tablet by mouth 2 times daily      rOPINIRole (REQUIP) 2 MG tablet Take 1 tablet by mouth nightly      temazepam (RESTORIL) 15 MG capsule       docusate sodium (COLACE) 100 MG capsule Take 1 capsule by mouth 2 times daily      magnesium oxide (MAG-OX) 400 (240 Mg) MG tablet Take 1 tablet by mouth daily      topiramate (TOPAMAX) 100 MG tablet Take 1 tablet by mouth 2 times daily      fluconazole (DIFLUCAN) 150 MG tablet Take by mouth      Cyanocobalamin (VITAMIN B-12) 1000 MCG/15ML LIQD 1 tablet under the tongue and allow to dissolve      SUMAtriptan Succinate (IMITREX PO) Take 100 mg by mouth as needed      Topiramate (TOPAMAX PO) Take by mouth every 12 hours      busPIRone (BUSPAR) 10 MG tablet Take 2 tablets by mouth 3 times daily      cetirizine (ZYRTEC) 10 MG tablet Take by mouth daily       DULoxetine (CYMBALTA) 60 MG extended release capsule Take 2 capsules by mouth daily      methylphenidate (RITALIN) 20 MG tablet Take by mouth 3 times daily.

## 2024-07-10 NOTE — PLAN OF CARE
Problem: Discharge Planning  Goal: Discharge to home or other facility with appropriate resources  7/10/2024 1021 by Bety Portillo RN  Outcome: Progressing  7/10/2024 0124 by Taty Joshi RN  Outcome: Progressing     Problem: Pain  Goal: Verbalizes/displays adequate comfort level or baseline comfort level  7/10/2024 1021 by Bety Portillo RN  Outcome: Progressing  7/10/2024 0124 by Taty Joshi RN  Outcome: Progressing     Problem: Safety - Adult  Goal: Free from fall injury  7/10/2024 1021 by Bety Portillo RN  Outcome: Progressing  7/10/2024 0124 by Taty Joshi RN  Outcome: Progressing

## 2024-07-19 ENCOUNTER — OFFICE VISIT (OUTPATIENT)
Dept: SURGERY | Age: 54
End: 2024-07-19

## 2024-07-19 VITALS
OXYGEN SATURATION: 97 % | TEMPERATURE: 97.5 F | BODY MASS INDEX: 24.91 KG/M2 | HEART RATE: 88 BPM | HEIGHT: 66 IN | WEIGHT: 155 LBS

## 2024-07-19 DIAGNOSIS — K81.0 ACUTE CHOLECYSTITIS: Primary | ICD-10-CM

## 2024-07-19 PROCEDURE — 99024 POSTOP FOLLOW-UP VISIT: CPT | Performed by: COLON & RECTAL SURGERY

## 2024-07-19 NOTE — PROGRESS NOTES
Subjective:      Patient ID: Isis Corral is a 53 y.o. female who presents for:  Chief Complaint   Patient presents with    Post-Op Check       She returns to the office 9 days out from a laparoscopic cholecystectomy with cholangiogram.    Histology reviewed and benign    Patient tolerating diet.  Minimal abdominal discomfort.  Bowels working.  Denies fever        Past Medical History:   Diagnosis Date    ADHD     Anxiety     Asthma     COPD (chronic obstructive pulmonary disease) (HCC)     Depression     Fibromyalgia     History of syncope 7/29/2022    Migraine     Primary hypertension 7/29/2022    Syncope and collapse      Past Surgical History:   Procedure Laterality Date    ANKLE FRACTURE SURGERY Left 10/9/2019    LEFT OPEN REDUCTION INTERNAL FIXATION MALLEOLUS FRACTURE performed by Wilver Rm DO at [facility] [place] OR    BACK SURGERY      CHOLECYSTECTOMY, LAPAROSCOPIC N/A 7/8/2024    LAPAROSCOPIC POSSIBLE OPEN  CHOLECYSTECTOMY WITH CHOLANGIOGRAM performed by Jeramie Merrill MD at [facility] [place] OR    KNEE SURGERY Right     2012    OTHER SURGICAL HISTORY      3 VAGINAL BIRTHS    TUBAL LIGATION      ENDOMETRIOSIS     Social History     Socioeconomic History    Marital status:      Spouse name: Not on file    Number of children: Not on file    Years of education: Not on file    Highest education level: Not on file   Occupational History    Not on file   Tobacco Use    Smoking status: Every Day     Current packs/day: 0.30     Types: Cigarettes    Smokeless tobacco: Never   Vaping Use    Vaping Use: Every day   Substance and Sexual Activity    Alcohol use: Not Currently     Comment: socially    Drug use: Yes     Types: Marijuana (Weed)     Comment: THC/CBD    Sexual activity: Not on file   Other Topics Concern    Not on file   Social History Narrative    Not on file     Social Determinants of Health     Financial Resource Strain: Not on file   Food Insecurity: Food Insecurity Present (7/7/2024)    Hunger

## 2024-09-13 DIAGNOSIS — I10 PRIMARY HYPERTENSION: ICD-10-CM

## 2024-09-13 DIAGNOSIS — T78.40XS ALLERGY, SEQUELA: ICD-10-CM

## 2024-09-13 RX ORDER — LISINOPRIL 5 MG/1
5 TABLET ORAL DAILY
Qty: 30 TABLET | Refills: 5 | OUTPATIENT
Start: 2024-09-13 | End: 2025-03-12

## 2024-09-13 RX ORDER — METOPROLOL TARTRATE 25 MG/1
25 TABLET, FILM COATED ORAL 2 TIMES DAILY
Qty: 180 TABLET | Refills: 3 | OUTPATIENT
Start: 2024-09-13

## 2024-09-13 RX ORDER — CETIRIZINE HYDROCHLORIDE 10 MG/1
10 TABLET ORAL DAILY
Qty: 90 TABLET | Refills: 3 | OUTPATIENT
Start: 2024-09-13 | End: 2025-09-13

## 2024-09-13 NOTE — TELEPHONE ENCOUNTER
Rx Refill Request     Name: Yasmin Rodriguez  :  1970   Medication Name:  LISINOPRIL 5MG, METOPROLOL 25MG, ZYRTEC 10MG     Specific Pharmacy location:  [facility]  Date of last appointment:  Visit date not found   Date of next appointment:  Visit date not found   Best number to reach patient:  [phone]

## 2024-09-13 NOTE — TELEPHONE ENCOUNTER
Rx Refill Request     Name: Yasmin Rodriguez  :  1970     Date of last appointment:  TELEMED on   Date of next appointment:  No appt scheduled.   Best number to reach patient:  [phone]

## 2024-10-31 NOTE — PROGRESS NOTES
"Subjective   Patient ID: Yasmin Rodriguez is a 54 y.o. female who presents for Hypertension, Anxiety, Hyperlipidemia, and Urinary Incontinence.  HPI  Autonomic dysfunction keep follow-up with specialist    Allergies stable no progression or worsening    Hypertension with good control  No chest pain or shortness of breath    Anxiety stable  No suicidal ideation no psychotic or manic symptoms    High cholesterol stable  Watch diet follow blood work    Migraine stable  No neurosymptoms    Update colon cancer screening    Stress urinary continence stable  Discussed further workup or evaluation she will consider  Review of Systems   Constitutional:  Negative for activity change and fatigue.   HENT:  Negative for congestion and sore throat.    Eyes:  Negative for discharge.   Respiratory:  Negative for cough, chest tightness and shortness of breath.    Cardiovascular:  Negative for chest pain and leg swelling.   Gastrointestinal:  Negative for abdominal pain, blood in stool, constipation, diarrhea, nausea and vomiting.   Endocrine: Negative for cold intolerance and heat intolerance.   Genitourinary:  Negative for difficulty urinating and hematuria.   Musculoskeletal:  Negative for arthralgias, back pain, gait problem, myalgias and neck pain.   Allergic/Immunologic: Negative for environmental allergies.   Neurological:  Negative for dizziness, syncope, weakness, numbness and headaches.   Hematological:  Negative for adenopathy. Does not bruise/bleed easily.   Psychiatric/Behavioral:  Negative for dysphoric mood. The patient is not nervous/anxious.    All other systems reviewed and are negative.      Objective   /88 (BP Location: Left arm, BP Cuff Size: Large adult)   Pulse 76   Ht 1.664 m (5' 5.5\")   Wt 74.5 kg (164 lb 3.2 oz)   SpO2 99%   BMI 26.91 kg/m²    Physical Exam  Vitals and nursing note reviewed.   Constitutional:       General: She is not in acute distress.     Appearance: Normal appearance. "   HENT:      Head: Normocephalic and atraumatic.      Right Ear: Tympanic membrane, ear canal and external ear normal.      Left Ear: Tympanic membrane, ear canal and external ear normal.      Nose: Nose normal.      Mouth/Throat:      Mouth: Mucous membranes are moist.      Pharynx: Oropharynx is clear. No oropharyngeal exudate or posterior oropharyngeal erythema.   Eyes:      Extraocular Movements: Extraocular movements intact.      Conjunctiva/sclera: Conjunctivae normal.      Pupils: Pupils are equal, round, and reactive to light.   Cardiovascular:      Rate and Rhythm: Normal rate and regular rhythm.      Pulses: Normal pulses.      Heart sounds: Normal heart sounds. No murmur heard.  Pulmonary:      Effort: Pulmonary effort is normal. No respiratory distress.      Breath sounds: Normal breath sounds. No wheezing or rales.   Abdominal:      General: Abdomen is flat. Bowel sounds are normal. There is no distension.      Palpations: Abdomen is soft. There is no mass.      Tenderness: There is no abdominal tenderness.   Musculoskeletal:         General: No swelling or deformity. Normal range of motion.      Cervical back: Normal range of motion and neck supple.      Right lower leg: No edema.      Left lower leg: No edema.   Lymphadenopathy:      Cervical: No cervical adenopathy.   Skin:     General: Skin is warm and dry.      Capillary Refill: Capillary refill takes less than 2 seconds.      Findings: No lesion or rash.   Neurological:      General: No focal deficit present.      Mental Status: She is alert and oriented to person, place, and time.      Cranial Nerves: No cranial nerve deficit.      Motor: No weakness.   Psychiatric:         Mood and Affect: Mood normal.         Behavior: Behavior normal.         Thought Content: Thought content normal.         Judgment: Judgment normal.         Assessment/Plan   Problem List Items Addressed This Visit       Allergies    Relevant Medications    albuterol 90  mcg/actuation inhaler    albuterol 2.5 mg /3 mL (0.083 %) nebulizer solution    ipratropium-albuteroL (Duo-Neb) 0.5-2.5 mg/3 mL nebulizer solution    cetirizine (ZyrTEC) 10 mg tablet    fluticasone (Flonase) 50 mcg/actuation nasal spray    Anxiety disorder    Autonomic dysfunction - Primary    Relevant Orders    Follow Up In [facility] - PCP    Primary hypertension    Relevant Medications    metoprolol tartrate (Lopressor) 25 mg tablet    Mixed hyperlipidemia    Migraine    Relevant Medications    ibuprofen 800 mg tablet     Other Visit Diagnoses       Colon cancer screening        Relevant Orders    Cologuard® colon cancer screening    SAHARA (stress urinary incontinence, female)        Relevant Medications    incontinence pad, liner, disp pad            Patient education provided.  Stay current with age appropriate health maintenance as instructed.  Appointment here or ER with new or worsening symptoms'  Keep appropriate follow-up visit.  Stay current with proper immunizations   Meds as above  Keep specialist follow-up  Recheck 3 months and as needed  Report new or worsening symptoms

## 2024-11-04 ENCOUNTER — APPOINTMENT (OUTPATIENT)
Dept: PRIMARY CARE | Facility: CLINIC | Age: 54
End: 2024-11-04
Payer: COMMERCIAL

## 2024-11-04 VITALS
OXYGEN SATURATION: 99 % | SYSTOLIC BLOOD PRESSURE: 137 MMHG | HEART RATE: 76 BPM | BODY MASS INDEX: 26.39 KG/M2 | HEIGHT: 66 IN | DIASTOLIC BLOOD PRESSURE: 88 MMHG | WEIGHT: 164.2 LBS

## 2024-11-04 DIAGNOSIS — I10 PRIMARY HYPERTENSION: ICD-10-CM

## 2024-11-04 DIAGNOSIS — N39.3 SUI (STRESS URINARY INCONTINENCE, FEMALE): ICD-10-CM

## 2024-11-04 DIAGNOSIS — E78.2 MIXED HYPERLIPIDEMIA: ICD-10-CM

## 2024-11-04 DIAGNOSIS — G43.809 OTHER MIGRAINE WITHOUT STATUS MIGRAINOSUS, NOT INTRACTABLE: ICD-10-CM

## 2024-11-04 DIAGNOSIS — Z12.11 COLON CANCER SCREENING: ICD-10-CM

## 2024-11-04 DIAGNOSIS — G90.9 AUTONOMIC DYSFUNCTION: Primary | ICD-10-CM

## 2024-11-04 DIAGNOSIS — T78.40XS ALLERGY, SEQUELA: ICD-10-CM

## 2024-11-04 DIAGNOSIS — F41.1 GENERALIZED ANXIETY DISORDER: ICD-10-CM

## 2024-11-04 PROBLEM — J45.909 ASTHMA: Status: RESOLVED | Noted: 2023-08-01 | Resolved: 2024-11-04

## 2024-11-04 PROCEDURE — 99214 OFFICE O/P EST MOD 30 MIN: CPT | Performed by: FAMILY MEDICINE

## 2024-11-04 PROCEDURE — 3008F BODY MASS INDEX DOCD: CPT | Performed by: FAMILY MEDICINE

## 2024-11-04 PROCEDURE — 3075F SYST BP GE 130 - 139MM HG: CPT | Performed by: FAMILY MEDICINE

## 2024-11-04 PROCEDURE — 3079F DIAST BP 80-89 MM HG: CPT | Performed by: FAMILY MEDICINE

## 2024-11-04 RX ORDER — CETIRIZINE HYDROCHLORIDE 10 MG/1
10 TABLET ORAL DAILY
Qty: 90 TABLET | Refills: 3 | Status: SHIPPED | OUTPATIENT
Start: 2024-11-04 | End: 2025-11-04

## 2024-11-04 RX ORDER — FLUTICASONE PROPIONATE 50 MCG
1 SPRAY, SUSPENSION (ML) NASAL DAILY
Qty: 16 G | Refills: 11 | Status: SHIPPED | OUTPATIENT
Start: 2024-11-04 | End: 2025-11-04

## 2024-11-04 RX ORDER — IBUPROFEN 800 MG/1
800 TABLET ORAL EVERY 6 HOURS PRN
Qty: 60 TABLET | Refills: 2 | Status: SHIPPED | OUTPATIENT
Start: 2024-11-04

## 2024-11-04 RX ORDER — METOPROLOL TARTRATE 25 MG/1
25 TABLET, FILM COATED ORAL 2 TIMES DAILY
Qty: 180 TABLET | Refills: 3 | Status: SHIPPED | OUTPATIENT
Start: 2024-11-04

## 2024-11-04 RX ORDER — ALBUTEROL SULFATE 0.83 MG/ML
2.5 SOLUTION RESPIRATORY (INHALATION) EVERY 4 HOURS PRN
Qty: 75 ML | Refills: 1 | Status: SHIPPED | OUTPATIENT
Start: 2024-11-04

## 2024-11-04 RX ORDER — ALBUTEROL SULFATE 90 UG/1
2 INHALANT RESPIRATORY (INHALATION) EVERY 6 HOURS PRN
Qty: 18 G | Refills: 3 | Status: SHIPPED | OUTPATIENT
Start: 2024-11-04

## 2024-11-04 RX ORDER — IPRATROPIUM BROMIDE AND ALBUTEROL SULFATE 2.5; .5 MG/3ML; MG/3ML
3 SOLUTION RESPIRATORY (INHALATION)
Qty: 180 ML | Refills: 3 | Status: SHIPPED | OUTPATIENT
Start: 2024-11-04

## 2024-11-04 ASSESSMENT — ENCOUNTER SYMPTOMS
ADENOPATHY: 0
VOMITING: 0
EYE DISCHARGE: 0
BRUISES/BLEEDS EASILY: 0
FATIGUE: 0
NUMBNESS: 0
SORE THROAT: 0
DIZZINESS: 0
COUGH: 0
NECK PAIN: 0
CHEST TIGHTNESS: 0
MYALGIAS: 0
NAUSEA: 0
DIARRHEA: 0
DIFFICULTY URINATING: 0
BLOOD IN STOOL: 0
ACTIVITY CHANGE: 0
BACK PAIN: 0
CONSTIPATION: 0
ABDOMINAL PAIN: 0
NERVOUS/ANXIOUS: 0
HEMATURIA: 0
HEADACHES: 0
ARTHRALGIAS: 0
SHORTNESS OF BREATH: 0
WEAKNESS: 0
DYSPHORIC MOOD: 0

## 2024-11-08 ENCOUNTER — HOSPITAL ENCOUNTER (OUTPATIENT)
Dept: WOMENS IMAGING | Age: 54
Discharge: HOME | End: 2024-11-10
Payer: COMMERCIAL

## 2024-11-08 ENCOUNTER — OFFICE VISIT (OUTPATIENT)
Dept: CARDIOLOGY CLINIC | Age: 54
End: 2024-11-08
Payer: COMMERCIAL

## 2024-11-08 ENCOUNTER — TELEPHONE (OUTPATIENT)
Dept: PRIMARY CARE | Facility: CLINIC | Age: 54
End: 2024-11-08
Payer: COMMERCIAL

## 2024-11-08 VITALS
HEART RATE: 70 BPM | DIASTOLIC BLOOD PRESSURE: 78 MMHG | SYSTOLIC BLOOD PRESSURE: 136 MMHG | BODY MASS INDEX: 26.47 KG/M2 | WEIGHT: 164 LBS | OXYGEN SATURATION: 100 %

## 2024-11-08 DIAGNOSIS — Z87.898 HISTORY OF SYNCOPE: ICD-10-CM

## 2024-11-08 DIAGNOSIS — R55 NEUROCARDIOGENIC SYNCOPE: ICD-10-CM

## 2024-11-08 DIAGNOSIS — Z87.891 HISTORY OF TOBACCO ABUSE: ICD-10-CM

## 2024-11-08 DIAGNOSIS — I10 PRIMARY HYPERTENSION: Primary | ICD-10-CM

## 2024-11-08 DIAGNOSIS — Z12.31 ENCOUNTER FOR SCREENING MAMMOGRAM FOR MALIGNANT NEOPLASM OF BREAST: ICD-10-CM

## 2024-11-08 PROBLEM — Z72.0 TOBACCO ABUSE: Status: RESOLVED | Noted: 2018-01-23 | Resolved: 2024-11-08

## 2024-11-08 PROCEDURE — G8484 FLU IMMUNIZE NO ADMIN: HCPCS | Performed by: INTERNAL MEDICINE

## 2024-11-08 PROCEDURE — 77063 BREAST TOMOSYNTHESIS BI: CPT

## 2024-11-08 PROCEDURE — 3078F DIAST BP <80 MM HG: CPT | Performed by: INTERNAL MEDICINE

## 2024-11-08 PROCEDURE — A9270 NON-COVERED ITEM OR SERVICE: HCPCS | Performed by: INTERNAL MEDICINE

## 2024-11-08 PROCEDURE — 4004F PT TOBACCO SCREEN RCVD TLK: CPT | Performed by: INTERNAL MEDICINE

## 2024-11-08 PROCEDURE — 3075F SYST BP GE 130 - 139MM HG: CPT | Performed by: INTERNAL MEDICINE

## 2024-11-08 PROCEDURE — 93000 ELECTROCARDIOGRAM COMPLETE: CPT | Performed by: INTERNAL MEDICINE

## 2024-11-08 PROCEDURE — G8419 CALC BMI OUT NRM PARAM NOF/U: HCPCS | Performed by: INTERNAL MEDICINE

## 2024-11-08 PROCEDURE — G8427 DOCREV CUR MEDS BY ELIG CLIN: HCPCS | Performed by: INTERNAL MEDICINE

## 2024-11-08 PROCEDURE — 99213 OFFICE O/P EST LOW 20 MIN: CPT | Performed by: INTERNAL MEDICINE

## 2024-11-08 PROCEDURE — 3017F COLORECTAL CA SCREEN DOC REV: CPT | Performed by: INTERNAL MEDICINE

## 2024-11-08 NOTE — TELEPHONE ENCOUNTER
Patient called in stating she received a cologuard in the mail from Dr. Almanzar but has a colonoscopy scheduled with Dr. Valladares. She is wondering which one she should complete?  Please Advise

## 2024-11-08 NOTE — PROGRESS NOTES
it is low. Patient clearly understands and agrees to the instructions.     We will need to continue to monitor muscle and liver enzymes, BUN, CR, and electrolytes.    Smoking cessation was strongly recommended    Check EKG    Compression stocking. Abd binder.

## 2024-11-11 ENCOUNTER — TELEMEDICINE (OUTPATIENT)
Dept: PRIMARY CARE | Facility: CLINIC | Age: 54
End: 2024-11-11
Payer: COMMERCIAL

## 2024-11-11 DIAGNOSIS — J45.41 MODERATE PERSISTENT ASTHMA WITH ACUTE EXACERBATION (HHS-HCC): ICD-10-CM

## 2024-11-11 DIAGNOSIS — J20.9 ACUTE BRONCHITIS, UNSPECIFIED ORGANISM: ICD-10-CM

## 2024-11-11 DIAGNOSIS — I10 PRIMARY HYPERTENSION: Primary | ICD-10-CM

## 2024-11-11 PROCEDURE — 99443 PR PHYS/QHP TELEPHONE EVALUATION 21-30 MIN: CPT | Performed by: FAMILY MEDICINE

## 2024-11-11 PROCEDURE — 1036F TOBACCO NON-USER: CPT | Performed by: FAMILY MEDICINE

## 2024-11-11 RX ORDER — METHYLPREDNISOLONE 4 MG/1
TABLET ORAL
Qty: 21 TABLET | Refills: 0 | Status: SHIPPED | OUTPATIENT
Start: 2024-11-11

## 2024-11-11 RX ORDER — LEVOFLOXACIN 500 MG/1
500 TABLET, FILM COATED ORAL DAILY
Qty: 10 TABLET | Refills: 0 | Status: SHIPPED | OUTPATIENT
Start: 2024-11-11 | End: 2024-11-21

## 2024-11-11 ASSESSMENT — ENCOUNTER SYMPTOMS
WEAKNESS: 0
WHEEZING: 1
DIZZINESS: 0
SORE THROAT: 0
MYALGIAS: 0
DIARRHEA: 0
RHINORRHEA: 1
ARTHRALGIAS: 0
COUGH: 0
ACTIVITY CHANGE: 0
NERVOUS/ANXIOUS: 0
PND: 1
ABDOMINAL PAIN: 0
NAUSEA: 0
HEADACHES: 1
CONSTIPATION: 0
NECK PAIN: 0
ORTHOPNEA: 1
DYSPHORIC MOOD: 0
FATIGUE: 0
BRUISES/BLEEDS EASILY: 0
VOMITING: 0
SHORTNESS OF BREATH: 1
NUMBNESS: 0
DIFFICULTY URINATING: 0
CHEST TIGHTNESS: 0
EYE DISCHARGE: 0
ADENOPATHY: 0
BACK PAIN: 0
BLOOD IN STOOL: 0
HEMATURIA: 0

## 2024-11-11 NOTE — PROGRESS NOTES
Subjective   Patient ID: Yasmin Rodriguez is a 54 y.o. female who presents for Sinusitis and Nasal Congestion.  Patient required telephone visit    Shortness of Breath  This is a recurrent problem. The current episode started yesterday. The problem occurs constantly. The problem has been rapidly worsening. The average episode lasts 7 days. Associated symptoms include coryza, headaches, orthopnea, PND, rhinorrhea and wheezing. Pertinent negatives include no abdominal pain, chest pain, leg swelling, neck pain, sore throat or vomiting. The symptoms are aggravated by exercise, URIs, any activity, weather changes, eating and lying flat.       At Home Covid test:    NEGATIVE  Just finished Zpak from Dentist     BP: 153/97  -Arm cuff and wrist cuff BP machine  HR: 76   SP02: 92%     Weight: 164 lb       Review of Systems   Constitutional:  Negative for activity change and fatigue.   HENT:  Positive for rhinorrhea. Negative for congestion and sore throat.    Eyes:  Negative for discharge.   Respiratory:  Positive for shortness of breath and wheezing. Negative for cough and chest tightness.    Cardiovascular:  Positive for orthopnea and PND. Negative for chest pain and leg swelling.   Gastrointestinal:  Negative for abdominal pain, blood in stool, constipation, diarrhea, nausea and vomiting.   Endocrine: Negative for cold intolerance and heat intolerance.   Genitourinary:  Negative for difficulty urinating and hematuria.   Musculoskeletal:  Negative for arthralgias, back pain, gait problem, myalgias and neck pain.   Allergic/Immunologic: Negative for environmental allergies.   Neurological:  Positive for headaches. Negative for dizziness, syncope, weakness and numbness.   Hematological:  Negative for adenopathy. Does not bruise/bleed easily.   Psychiatric/Behavioral:  Negative for dysphoric mood. The patient is not nervous/anxious.    All other systems reviewed and are negative.      Objective   There were no  vitals taken for this visit.   Physical Exam  Telephone visit  Assessment/Plan   Problem List Items Addressed This Visit       Primary hypertension - Primary     Other Visit Diagnoses       Acute bronchitis, unspecified organism        Relevant Medications    levoFLOXacin (Levaquin) 500 mg tablet    methylPREDNISolone (Medrol Dospak) 4 mg tablets    Moderate persistent asthma with acute exacerbation ([facility])                Patient education provided.  Stay current with age appropriate health maintenance as instructed.  Appointment here or ER with new or worsening symptoms'  Keep appropriate follow-up visit.  Stay current with proper immunizations     She declines more testing  Medicine as above  ER if worse  Discussed at length with patient

## 2024-11-25 ENCOUNTER — TELEPHONE (OUTPATIENT)
Dept: CARDIOLOGY CLINIC | Age: 54
End: 2024-11-25

## 2024-11-25 NOTE — TELEPHONE ENCOUNTER
Pt calling for abdominal binder script?    States that the pharmacy did not receive compression stockings script either?    Under done today in pt's 11/8/24 AVS- it said Abdominal Binder.    Pt uses [facility].  Please create hard scripts so we can fax to pharmacy.      Fax # [phone]

## 2024-12-06 ENCOUNTER — TELEPHONE (OUTPATIENT)
Dept: PRIMARY CARE | Facility: CLINIC | Age: 54
End: 2024-12-06
Payer: COMMERCIAL

## 2024-12-06 PROBLEM — N81.89 WEAKNESS OF PELVIC FLOOR: Status: ACTIVE | Noted: 2024-12-06

## 2024-12-06 NOTE — TELEPHONE ENCOUNTER
QUINTIN FROM [facility] CALLED IN TO REQUEST A DIAGNOSES CODE  FOR THE INCONTINENCE SUPPLIES AND ALSO REQUESTING LAST OFFICE NOTES FOR [facility]ENRY. CAN RE-ADD THE CODE TO THE CMN AND FAX [phone].

## 2025-02-04 ENCOUNTER — APPOINTMENT (OUTPATIENT)
Dept: PRIMARY CARE | Facility: CLINIC | Age: 55
End: 2025-02-04
Payer: COMMERCIAL

## 2025-02-04 DIAGNOSIS — F41.1 GENERALIZED ANXIETY DISORDER: ICD-10-CM

## 2025-02-04 DIAGNOSIS — G93.32 CHRONIC FATIGUE AND IMMUNE DYSFUNCTION SYNDROME (MULTI): ICD-10-CM

## 2025-02-04 DIAGNOSIS — F17.200 CURRENT SMOKER: ICD-10-CM

## 2025-02-04 DIAGNOSIS — D89.89 CHRONIC FATIGUE AND IMMUNE DYSFUNCTION SYNDROME (MULTI): ICD-10-CM

## 2025-02-04 DIAGNOSIS — M79.671 FOOT PAIN, RIGHT: Primary | ICD-10-CM

## 2025-02-04 DIAGNOSIS — E78.2 MIXED HYPERLIPIDEMIA: ICD-10-CM

## 2025-02-04 DIAGNOSIS — G43.809 OTHER MIGRAINE WITHOUT STATUS MIGRAINOSUS, NOT INTRACTABLE: ICD-10-CM

## 2025-02-04 DIAGNOSIS — J43.1 PANLOBULAR EMPHYSEMA (MULTI): ICD-10-CM

## 2025-02-04 DIAGNOSIS — R93.89 ABNORMAL CT OF THE CHEST: ICD-10-CM

## 2025-02-04 DIAGNOSIS — G90.9 AUTONOMIC DYSFUNCTION: ICD-10-CM

## 2025-02-04 DIAGNOSIS — F33.3 MAJOR DEPRESSIVE DISORDER, RECURRENT, SEVERE WITH PSYCHOTIC SYMPTOMS (MULTI): ICD-10-CM

## 2025-02-04 DIAGNOSIS — I10 PRIMARY HYPERTENSION: ICD-10-CM

## 2025-02-04 PROCEDURE — 1036F TOBACCO NON-USER: CPT | Performed by: FAMILY MEDICINE

## 2025-02-04 PROCEDURE — 99214 OFFICE O/P EST MOD 30 MIN: CPT | Performed by: FAMILY MEDICINE

## 2025-02-04 RX ORDER — ACETAMINOPHEN 500 MG
1 TABLET ORAL DAILY
Qty: 1 KIT | Refills: 0 | Status: SHIPPED | OUTPATIENT
Start: 2025-02-04

## 2025-02-04 ASSESSMENT — ENCOUNTER SYMPTOMS
DYSPHORIC MOOD: 0
COUGH: 0
NERVOUS/ANXIOUS: 0
ACTIVITY CHANGE: 0
VOMITING: 0
BACK PAIN: 0
MYALGIAS: 0
ARTHRALGIAS: 0
BLOOD IN STOOL: 0
NUMBNESS: 0
DIZZINESS: 0
SORE THROAT: 0
ADENOPATHY: 0
ABDOMINAL PAIN: 0
CONSTIPATION: 0
DIARRHEA: 0
WEAKNESS: 0
HEMATURIA: 0
NAUSEA: 0
BRUISES/BLEEDS EASILY: 0
FATIGUE: 0
EYE DISCHARGE: 0
NECK PAIN: 0
SHORTNESS OF BREATH: 0
DIFFICULTY URINATING: 0
HEADACHES: 0
CHEST TIGHTNESS: 0

## 2025-02-04 NOTE — PROGRESS NOTES
Subjective   Patient ID: Yasmin Rodriguez is a 54 y.o. female who presents for Follow-up.  HPI  virtual visit    Patient with multiple issues    Foot pain on the right  Concerned about possible fracture would recommend x-rays  She injured her toe at home    Automatic function ongoing keep cardiac and neuro follow-up  Also has migraines and neuro evaluation will be helpful for this  She would like new referral    Hypertension stable no chest pain or shortness of breath    High cholesterol stable watch diet follow blood work    She smokes and should quit discussed risks    Generalized anxiety stable no progression or worsening    History of abnormal CT chest recommend follow-up    Mood stable  No suicidal ideation no psychotic or manic symptoms  Review of Systems   Constitutional:  Negative for activity change and fatigue.   HENT:  Negative for congestion and sore throat.    Eyes:  Negative for discharge.   Respiratory:  Negative for cough, chest tightness and shortness of breath.    Cardiovascular:  Negative for chest pain and leg swelling.   Gastrointestinal:  Negative for abdominal pain, blood in stool, constipation, diarrhea, nausea and vomiting.   Endocrine: Negative for cold intolerance and heat intolerance.   Genitourinary:  Negative for difficulty urinating and hematuria.   Musculoskeletal:  Negative for arthralgias, back pain, gait problem, myalgias and neck pain.   Allergic/Immunologic: Negative for environmental allergies.   Neurological:  Negative for dizziness, syncope, weakness, numbness and headaches.   Hematological:  Negative for adenopathy. Does not bruise/bleed easily.   Psychiatric/Behavioral:  Negative for dysphoric mood. The patient is not nervous/anxious.    All other systems reviewed and are negative.      Objective   There were no vitals taken for this visit.   Physical Exam  virtual  Assessment/Plan   Problem List Items Addressed This Visit       Abnormal CT of the chest    Relevant  Orders    CT chest w IV contrast    Anxiety disorder    Autonomic dysfunction    Relevant Orders    Referral to Neurology    Primary hypertension    Relevant Medications    blood pressure monitor kit    Chronic fatigue and immune dysfunction syndrome (Multi)    Current smoker    Mixed hyperlipidemia    Migraine    Foot pain, right - Primary    Relevant Orders    XR foot right 3+ views    Major depressive disorder, recurrent, severe with psychotic symptoms (Multi)    Panlobular emphysema (Multi)       Patient education provided.  Stay current with age appropriate health maintenance as instructed.  Appointment here or ER with new or worsening symptoms'  Keep appropriate follow-up visit.  Stay current with proper immunizations   Stressed importance of specialist follow-up  Orders as above  Referral as above  Testing as noted  Recheck 3 months and as needed    Report suicidal ideation  Report psychotic or manic symptoms

## 2025-02-10 ENCOUNTER — HOSPITAL ENCOUNTER (OUTPATIENT)
Age: 55
Discharge: HOME | End: 2025-02-12
Payer: COMMERCIAL

## 2025-02-10 ENCOUNTER — HOSPITAL ENCOUNTER (OUTPATIENT)
Dept: GENERAL RADIOLOGY | Age: 55
Discharge: HOME | End: 2025-02-12
Payer: COMMERCIAL

## 2025-02-10 DIAGNOSIS — R52 PAIN: ICD-10-CM

## 2025-02-10 PROCEDURE — 73630 X-RAY EXAM OF FOOT: CPT

## 2025-02-12 ENCOUNTER — TRANSCRIBE ORDERS (OUTPATIENT)
Dept: ADMINISTRATIVE | Age: 55
End: 2025-02-12

## 2025-02-12 DIAGNOSIS — R93.89 ABNORMAL CT OF THE CHEST: Primary | ICD-10-CM

## 2025-02-20 ENCOUNTER — APPOINTMENT (OUTPATIENT)
Dept: ORTHOPEDIC SURGERY | Facility: CLINIC | Age: 55
End: 2025-02-20
Payer: COMMERCIAL

## 2025-02-24 ENCOUNTER — APPOINTMENT (OUTPATIENT)
Dept: ORTHOPEDIC SURGERY | Facility: CLINIC | Age: 55
End: 2025-02-24
Payer: COMMERCIAL

## 2025-02-25 ENCOUNTER — TELEPHONE (OUTPATIENT)
Dept: PRIMARY CARE | Facility: CLINIC | Age: 55
End: 2025-02-25

## 2025-02-25 ENCOUNTER — APPOINTMENT (OUTPATIENT)
Dept: ORTHOPEDIC SURGERY | Facility: CLINIC | Age: 55
End: 2025-02-25
Payer: COMMERCIAL

## 2025-02-25 DIAGNOSIS — E78.2 MIXED HYPERLIPIDEMIA: ICD-10-CM

## 2025-02-25 DIAGNOSIS — I10 PRIMARY HYPERTENSION: ICD-10-CM

## 2025-02-25 NOTE — TELEPHONE ENCOUNTER
[facility] CALLED IN TO GET RECENT LAB ORDERS FOR HENRY, DID NOT SEE ANY WITHIN LAST 6WEEKS. THEY ARE REQUESTING A LAB ORDER FOR HER CREATININE BE PLACED AND FAXED TO THEM ASAP.     FAX# [phone]

## 2025-02-27 NOTE — TELEPHONE ENCOUNTER
[facility] called in stating the order sent in missing an electronic signature. They are asking for this to be corrected and faxed again  Please Advise

## 2025-02-27 NOTE — TELEPHONE ENCOUNTER
Nya from [facility] called in stating the order still doesn't have a signature. She is asking for a new order to be sent with a physical signature from Dr. Almanzar  Please Advise

## 2025-04-08 DIAGNOSIS — T78.40XS ALLERGY, SEQUELA: ICD-10-CM

## 2025-04-08 RX ORDER — ALBUTEROL SULFATE 90 UG/1
2 INHALANT RESPIRATORY (INHALATION) EVERY 6 HOURS PRN
Qty: 8.5 G | Refills: 0 | Status: SHIPPED | OUTPATIENT
Start: 2025-04-08

## 2025-04-08 NOTE — TELEPHONE ENCOUNTER
Rx Refill Request     Name: Yasmin Rodriguez  :  1970   Medication Name:  albuterol 90 mcg/actuation inhaler   Specific Pharmacy location:  [facility] [address] [place], [place]   Date of last appointment:  2024   Date of next appointment:  Visit date not found   Best number to reach patient:  [phone]

## 2025-05-05 ENCOUNTER — APPOINTMENT (OUTPATIENT)
Dept: NEUROLOGY | Facility: CLINIC | Age: 55
End: 2025-05-05
Payer: COMMERCIAL

## 2025-07-02 DIAGNOSIS — I10 PRIMARY HYPERTENSION: ICD-10-CM

## 2025-07-02 NOTE — TELEPHONE ENCOUNTER
Rx Refill Request Telephone Encounter    Name:  Yasmin Rodriguez  :  [id number]  Medication Name:  metoprolol tartrate (Lopressor) 25 mg tablet     Specific Pharmacy location:  [facility] [place]  Date of last appointment:  VV 25  Date of next appointment:  25  Best number to reach patient:  [phone]

## 2025-07-03 RX ORDER — METOPROLOL TARTRATE 25 MG/1
25 TABLET, FILM COATED ORAL 2 TIMES DAILY
Qty: 180 TABLET | Refills: 3 | Status: SHIPPED | OUTPATIENT
Start: 2025-07-03

## 2025-07-07 ENCOUNTER — OFFICE VISIT (OUTPATIENT)
Dept: NEUROLOGY | Facility: CLINIC | Age: 55
End: 2025-07-07
Payer: COMMERCIAL

## 2025-07-07 VITALS
HEART RATE: 90 BPM | HEIGHT: 66 IN | SYSTOLIC BLOOD PRESSURE: 153 MMHG | DIASTOLIC BLOOD PRESSURE: 99 MMHG | BODY MASS INDEX: 26.5 KG/M2

## 2025-07-07 DIAGNOSIS — M54.50 LOW BACK PAIN AT MULTIPLE SITES: ICD-10-CM

## 2025-07-07 DIAGNOSIS — G62.9 NEUROPATHY: ICD-10-CM

## 2025-07-07 DIAGNOSIS — I95.1 ORTHOSTATIC HYPOTENSION: Primary | ICD-10-CM

## 2025-07-07 DIAGNOSIS — R55 SYNCOPE, UNSPECIFIED SYNCOPE TYPE: ICD-10-CM

## 2025-07-07 DIAGNOSIS — M54.2 NECK PAIN: ICD-10-CM

## 2025-07-07 DIAGNOSIS — I87.8 VENOUS STASIS: ICD-10-CM

## 2025-07-07 PROCEDURE — 3080F DIAST BP >= 90 MM HG: CPT | Performed by: SPECIALIST

## 2025-07-07 PROCEDURE — 99417 PROLNG OP E/M EACH 15 MIN: CPT | Performed by: SPECIALIST

## 2025-07-07 PROCEDURE — 99205 OFFICE O/P NEW HI 60 MIN: CPT | Performed by: SPECIALIST

## 2025-07-07 PROCEDURE — 99215 OFFICE O/P EST HI 40 MIN: CPT | Performed by: SPECIALIST

## 2025-07-07 PROCEDURE — 3077F SYST BP >= 140 MM HG: CPT | Performed by: SPECIALIST

## 2025-07-07 ASSESSMENT — ENCOUNTER SYMPTOMS
LOSS OF SENSATION IN FEET: 0
DEPRESSION: 1
OCCASIONAL FEELINGS OF UNSTEADINESS: 0

## 2025-07-07 ASSESSMENT — PATIENT HEALTH QUESTIONNAIRE - PHQ9
SUM OF ALL RESPONSES TO PHQ9 QUESTIONS 1 & 2: 2
2. FEELING DOWN, DEPRESSED OR HOPELESS: SEVERAL DAYS
SUM OF ALL RESPONSES TO PHQ9 QUESTIONS 1 AND 2: 2
2. FEELING DOWN, DEPRESSED OR HOPELESS: SEVERAL DAYS
1. LITTLE INTEREST OR PLEASURE IN DOING THINGS: SEVERAL DAYS
1. LITTLE INTEREST OR PLEASURE IN DOING THINGS: SEVERAL DAYS

## 2025-07-07 NOTE — PROGRESS NOTES
"Date of Service: 7/7/2025  Patient: Yasmin Rodriguez  MRN: [id number]  Referring Provider: No ref. provider found  Primary Care Physician: Kenrick Almanzar MD     History of Present Illness:    Ms. William Rodriguez is a 54 y.o. right handed female  with a complex medical history including asthma, depression, anxiety, fibromyalgia, headache, obstructive sleep apnea, chronic fatigue syndrome, ADHD, presyncope, syncope, low blood pressure, and Raynaud's phenomenon, smoking presenting with ongoing concerns about multiple symptoms that have persisted for 5 years.    The patient reports a constellation of symptoms that began in 2020, including episodes of losing consciousness, lightheadedness, and orthostatic hypotension. She describes her blood pressure fluctuating drastically, dropping from 129/70 to 79/60 mm/hg during orthostatic changes. These symptoms have significantly impacted her daily functioning, leading to her inability to work or drive since their onset. She has experienced \"crash days\" where she sleeps for 2-3 days straight, sometimes being unresponsive to the point where family members had to call emergency services.    Associated symptoms include palpitations, presyncope, graying of vision, weakness, shortness of breath (which she attributes to asthma and emphysema), mild chest pain, fatigue, cold hands and feet, discoloration (related to Raynaud's), difficulty with concentration, tingling in hands and feet, and altered sweating patterns. She also reports experiencing nausea, early satiety, and often forgetting to eat. Additional symptoms include dry eyes, dry mouth, joint pain, occasional rashes on her hands, arms, and legs, and both heat and cold intolerance.    The patient's symptoms have led to multiple healthcare interactions. She reports failing a tilt table test, though her stress test came back normal. She has been seen in the ICU following an episode at home. Previous evaluations include " EKG showing sinus tachycardia without specific ST changes, and an echocardiogram in 2020 revealing mild to moderate asymmetrical left ventricular hypertrophy and impaired relaxation pattern. She follows up with her Cardiologist, Dr. Brink, annually.    Angie has attempted various treatments to manage her symptoms. She takes metoprolol 25 mg twice daily, adjusting doses based on her blood pressure. She also uses increased salt intake, abdominal binders, and compression stockings. For her ADHD, she takes methylphenidate 20 mg three times daily, with flexibility in dosage. She reports that her ADHD symptoms have improved with the stimulant medication.    The patient's condition has had significant psychosocial impacts. She has been unable to work or drive, leading to the loss of her car and house, and necessitating her moving in with her parents. She expresses frustration with the lack of a clear diagnosis and the limitations imposed by her symptoms.    Regarding lifestyle factors, Angie reports maintaining sobriety since October 11, 2020, following a period of heavy alcohol use at the beginning of the COVID-19 pandemic. She has experienced unintentional weight loss of 60-70 pounds over 6 months, starting a year ago. She also mentions being postmenopausal following an endometrial ablation in 2022 or 2023, without hormonal replacement therapy, and expe    She had prior work up in the past including  Neurology evaluation Dr. Jama 2023:  - Orthostatic Hypotension ( as documented on tilt table testing)   - Possible autonomic dysfunction.     He recommended hydration and applying compression socks     Cardiology evaluation by  Luca Helms on 11/08/2024 who diagnosed neurogenic orthostatic hypotension     He recommended:   Continue current medications.  [place] oral hydration and salt intake.  Cardiac diet, BP monitoring at home with log.  Compression stockings and abdominal binder.  No current indication  for implantable loop recorder.  Continue monitoring labs (muscle/liver enzymes, BUN, Cr, electrolytes).  Strongly encouraged smoking cessation.  Advised to call if BP outside target ranges.      Prior diagnostic testing  Tilt-Table Test    BP: Dropped from 129/72 to 79/61 mmHg    HR: Dropped from 78 to 59 bpm    Result: Syncope during tilt, consistent with orthostatic hypotension.    MRI Brain: reported normal except of an old infarct.  Nuclear Stress Test: Negative.  TSH: Normal.  Echocardiogram (2018): Normal.  EKGs: Normal sinus rhythm (NSR) with nonspecific ST/T changes.  Stress test: Normal    Echocardiogram (2020):    1. The left ventricular systolic function is normal with a 60-65% estimated ejection fraction.   2. Poorly visualized anatomical structures due to suboptimal image quality.   3. Spectral Doppler shows an impaired relaxation pattern of left ventricular diastolic filling.   4. There is mild to moderate asymmetric left ventricular hypertrophy.    Holter monitor : Normal  Blood glucose: 95 mg/dL (home measurement, date not specified).    Medical History  - Postmenopausal after endometrial ablation in 2022 or 2023  - COVID-19 infection  - Unintentional weight loss of 60-70 pounds over 6 months, starting a year ago  - Autonomic orthostatic hypotension diagnosed in 2020  - Obstructive sleep apnea  - Fibromyalgia  - Raynaud's syndrome  - Chronic fatigue syndrome  - ADHD  - Depression  - Anxiety  - Asthma  - Emphysema  - Migraines  - Insomnia  - ICU admission due to an episode at home  - Failed tilt table test    Surgical History  - Endometrial ablation in 2022 or 2023    Medications and Supplements  - Metoprolol 25 mg by mouth twice a day    - Sometimes skips doses if blood pressure is low  - Methylphenidate 20 mg by mouth three times a day    - Flexible dosage    - Improves ADHD symptoms    Family History  - Mother: Benign brain tumor, 13-pound ovarian cancer removed, experiencing syncope and balance  issues, rheumatoid arthritis  - Sister: Diagnosed with connective tissue disease, hidradenitis suppurativa, polymyalgia rheumatica    Social History  - Substance Use: Sobriety date October 11, 2020. History of heavy alcohol use beginning in early 2020.  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  - Living Situation: Moved in with parents after losing house  - Exercise: Swimming, rowing, and biking recommended  - Diet: Often forgets to eat, feels full quickly  - Stress: Experiences extreme anxiety    Review of Systems  General: Positive for fatigue, unintentional weight loss, cold intolerance, heat intolerance.  Skin: Positive for occasional rashes on hands, arms, and legs. Positive for cold hands and feet, discoloration (Raynaud's).  HEENT: Positive for dry eyes, dry mouth.  Cardiovascular: Positive for palpitations, lightheadedness, presyncope, syncope, graying of vision.  Respiratory: Positive for shortness of breath.  Gastrointestinal: Positive for nausea, early satiety, constipation, diarrhea.  Genitourinary: Positive for incontinence.  Musculoskeletal: Positive for joint pain, muscle pain, mild proximal weakness in arms and legs, discomfort in left knee, left shoulder, neck.  Neurological: Positive for tingling in hands and feet, dizziness, balance issues, tremors, memory loss, difficulty with concentration.  Endocrine: Positive for hot flashes.  Psychiatric: Positive for anxiety, depression, irritability.  Other: Positive for increased or decreased sweating, brain fog, crash days (sleeping for 2-3 days straight).          Problems Assessed/Relevant:  Problem List Items Addressed This Visit    None    Past Medical History:  Diagnosis Date    ADHD (attention deficit hyperactivity disorder)     Allergic     Anxiety     Arthritis     Asthma     Depression     Emphysema of lung (Multi)     Headache     Hypertension     Insomnia     Memory  loss     Migraine     Movement disorder     Neuromuscular disorder (Multi)     Numbness     Restless leg syndrome     Sleep apnea     Stroke (Multi)     Tremor     Varicella     Visual impairment      Past Surgical History:   Procedure Laterality Date    ENDOMETRIAL ABLATION      FRACTURE SURGERY      JOINT REPLACEMENT  2014    OTHER SURGICAL HISTORY  10/04/2021    Knee arthroscopy    OTHER SURGICAL HISTORY  10/04/2021    Knee replacement    OTHER SURGICAL HISTORY  10/04/2021    Fibula fracture repair    OTHER SURGICAL HISTORY  10/04/2021    Endometrial ablation    OTHER SURGICAL HISTORY  10/04/2021    Tubal ligation    OTHER SURGICAL HISTORY  2021    Nerve block anesthesia    TUBAL LIGATION  1998    WISDOM TOOTH EXTRACTION  ????     Family History   Problem Relation Name Age of Onset    Cancer Mother      Hypertension Mother      Hypertension Father      Alcohol abuse Son Angel Luis Ksenich     Asthma Son Angel Luis Saeedenicbecky     Arthritis Mother José Antonio Sánchez     Breast cancer Mother José Antonio Gnozalo     Cancer Mother José Antonio Gonzalo     Depression Mother José Antonio Gonzalo     Hyperlipidemia Mother José Antonio Madridant     Hypertension Mother José Antonio Sánchez     Ovarian cancer Mother José Antonio Sánchez     Arthritis Sister Ca Tomeka Garcia     Asthma Sister Ca Tomeka Garcia     Breast cancer Sister Ca Tomeka Garcia     Cancer Sister Ca Tomeka Garcia     Depression Sister Ca Tomeka Garcia     Hypertension Sister Ca Tomeka Garcia     Mental illness Sister Ca Tomeka Garcia     Miscarriages / Stillbirths Sister Ca Tomeka Garcia      labor Sister Ca Tomeka Garcia     Asthma Son Sherwin Thomas     Atrial fibrillation Son Sherwin Thomas     Mental illness Son Sherwin Thomas     Asthma Father Dong Sánchez     COPD Father Dong Sánchez     Diabetes Father Dong Sánchez     Hearing loss Father Dong Sánchez     Hyperlipidemia Father Dong Sánchez     Hypertension Father Dong Sánchez     Asthma Daughter Santos Shannon Ksenich     Blood  Disorder Daughter Santos Shannon Ksenich     COPD Daughter Santos Shannon Ksenich     Depression Daughter Santos Shannon Ksenich     Mental illness Daughter Santos Shannon Ksenich     Blood Disorder Brother Kevin Sánchez     Hearing loss Brother Kevin Sánchez     Hypertension Brother Kevin Sánchez     Cancer Mother's Sister Stephanie Sanchez     Cancer Mother's Sister Kaitlin Avina Sword     Depression Mother's Sister Kaitlin Avina Sword     Heart disease Mother's Sister Kaitlin Avina Sword     Hypertension Mother's Sister Kaitlin Avina Sword     Cancer Father's Brother Cathryn Gonzalo     Cancer Father's Brother Darvin Gonzalo     Learning disabilities Father's Brother Darvin Gonzalo     Mental illness Father's Brother Darvin Gonzalo     Intellectual Disability Father's Brother Darvin Gonzalo     COPD Mother's Sister Ashleigh Burroughs     Diabetes Mother's Sister Ashleigh Burroughs     Diabetes Maternal Grandmother Indira Rodriguez     Heart disease Maternal Grandmother Indira Rodriguez     Stroke Maternal Grandmother Indira Rodriguez     Hearing loss Father's Brother Maurilio Gonzalo     Heart disease Maternal Grandfather Keith Rodriguez     Heart disease Paternal Grandmother Ameena Sánchez     Heart disease Paternal Grandfather Kevin Sánchez     Learning disabilities Father's Sister Hernesto Al     Mental illness Father's Sister Hernesto Al     Intellectual Disability Father's Sister Hernesto Al     Alcohol abuse Son Angel Luis Ksenich     Asthma Son Angel Luis Ksenicbecky     Depression Mother José Antonio Gonzalo     Cancer Mother José Antonio Gonzalo     Fainting Mother José Antonio Gonzalo 20 - 29    Hypertension Mother José Antonio Sánchez     Breast cancer Mother José Antonio Sánchez     Ovarian cancer Mother José Antonioariane Sánchez     Hyperlipidemia Mother José Antonio Gonzalo     Anxiety disorder Mother José Antonio Gonzalo 60 - 69    Arthritis Mother José Antonio Gonzalo     Fibromyalgia Mother José Antonio Gonzalo 30 - 39    Restless legs syndrome Mother José Antonio Gonzalo 50 - 59    Depression Sister Shari Eckert  Garcia     Cancer Sister Ca Tomeka Garcia     Fainting Sister Ca Tomeka Garcia 30 - 39    Hypertension Sister Ca Tomeka Garcia      labor Sister Ca Tomeka Garcia     Breast cancer Sister Ca Tomeka Garcia     Neuropathy Sister Ca Tomeka Garcia 30 - 39    Asthma Sister Ca Tomeka Garcia     Arthritis Sister Ca Tomeka Garcia     Mental illness Sister Ca Tomeka Garcia     Miscarriages / Stillbirths Sister Ca Tomeka Garcia     Fibromyalgia Sister Ca Tomeka Garcia 30 - 39    Myopathy Sister Ca Tomeka Garcia 40 - 49    Asthma Son Sherwin Thomas     Anxiety disorder Son Sherwin Thomas 0 - 9    ADD / ADHD Son Sherwin Thomas 0 - 9    Mental illness Son Sherwin Thomas     Autism Son Sherwin Thomas 0 - 9    Atrial fibrillation Son Sherwin Thomas     COPD Father Palter Gonzalo     Diabetes Father Palter Gonzalo     Hypertension Father Palter Gonzalo     Hyperlipidemia Father Palter Gonzalo     Asthma Father Palumer Gonzalo     Hearing loss Father Palumer Gonzalo     COPD Daughter Santos Shannon Ksenich     Depression Daughter Santos Taverasabellcatalina Ksenich     Asthma Daughter Santos Shannon Ksenich     Migraines Daughter Santos Shannon Ksenich 10 - 19    Anxiety disorder Daughter Santos Shannon Ksenich 20 - 29    ADD / ADHD Daughter Santos Shannon Ksenich 20 - 29    Mental illness Daughter Santos Shannon Ksenich     Blood Disorder Daughter Santos Hunglla Ksenich     Hypertension Brother Kevin Sánchez     Migraines Brother Kevin Sánchez 40 - 49    Hearing loss Brother Kevin Sánchez     Blood Disorder Brother Kevin Sánchez     Cancer Mother's Sister Stephanie Daniel     Depression Mother's Sister Kaitlin Avina Estrellita     Cancer Mother's Sister Kaitlin Avina Estrellita     Hypertension Mother's Sister Kaitlin Avina Estrellita     Heart disease Mother's Sister Kaitlin Avina Estrellita     Cancer Father's Brother Cathryn Sánchez     Intellectual Disability Father's Brother Darvin Sánchez     Cancer Father's Brother Darvin Sánchez     Learning  disabilities Father's Brother Darvin Sánchez     Mental illness Father's Brother Darvin Sánchez     COPD Mother's Sister Ashleigh Burroughs     Diabetes Mother's Sister Ashleigh Burroughs     Diabetes Maternal Grandmother Indira Rodriguez     Stroke Maternal Grandmother Indira Rodriguez     Heart disease Maternal Grandmother Indira Rodriguez     Hearing loss Father's Brother Maurilio Madridant     Heart disease Maternal Grandfather Keith Rodriguez     Heart disease Paternal Grandmother Ameena Madridant     Heart disease Paternal Grandfather Kevin Sánchez     Intellectual Disability Father's Sister Hernesto Melendez     Learning disabilities Father's Sister Hernesto Melendez     Mental illness Father's Sister Hernesto Melendez     Migraines Mother's Sister Ashleigh Burroughs 30 - 39     Social History     Tobacco Use    Smoking status: Former     Current packs/day: 0.00     Average packs/day: 1 pack/day for 15.0 years (15.0 ttl pk-yrs)     Types: Cigarettes     Quit date: 2024     Years since quittin.9     Passive exposure: Never    Smokeless tobacco: Never    Tobacco comments:     Currently use vape   Substance Use Topics    Alcohol use: Not Currently     Comment: Sobriety date 10/11/2020!      RX Allergies[1]     Medications:  Current Outpatient Medications:     albuterol 2.5 mg /3 mL (0.083 %) neb mL (2.5 mg) by nebulization every 4 hours if needed for wheezing., Disp: 75 mL, Rfl: 1    albuterol 90 mcg/actuation inhaler, Inhale 2 puffs every 6 hours if needed for wheezing., Disp: 8.5 g, Rfl: 0    ALPRAZolam (Xanax) 1 mg tablet, Take 1 tablet (1 mg) by mouth 3 times a day., Disp: , Rfl:     blood pressure monitor kit, 1 kit once daily., Disp: 1 kit, Rfl: 0    blood sugar diagnostic (Blood Glucose Test) strip, TEST ONCE DAILY, Disp: 100 each, Rfl: 3    calcium carbonate (Tums) 200 mg calcium chewable tablet, Chew., Disp: , Rfl:     cetirizine (ZyrTEC) 10 mg tablet, Take 1 tablet (10 mg) by mouth once daily., Disp: 90 tablet, Rfl: 3     cyanocobalamin, vitamin B-12, 1,000 mcg tablet, sublingual, Place 1 tablet (1,000 mcg) under the tongue once daily., Disp: , Rfl:     docusate sodium (Colace) 100 mg capsule, TAKE ONE CAPSULE BY MOUTH TWO TIMES A DAY. HOLD IF DIARRHEA, Disp: , Rfl:     EPINEPHrine 0.3 mg/0.3 mL injection syringe, INJECT 0.3 ML INTO THE MUSCLE ONCE AS NEEDED FOR ANAPHYLAXIS, Disp: 1 each, Rfl: 2    fluticasone (Flonase) 50 mcg/actuation nasal spray, Administer 1 spray into each nostril once daily. Shake gently. Before first use, prime pump. After use, clean tip and replace cap., Disp: 16 g, Rfl: 11    ibuprofen 800 mg tablet, Take 1 tablet (800 mg) by mouth every 6 hours if needed for mild pain (1 - 3)., Disp: 60 tablet, Rfl: 2    incontinence pad, liner, disp pad, 1 Pad 3 times a day as needed (incontinence)., Disp: 100 each, Rfl: 3    ipratropium-albuteroL (Duo-Neb) 0.5-2.5 mg/3 mL nebulizer solution, Take 3 mL by nebulization 3 times a day., Disp: 180 mL, Rfl: 3    magnesium oxide (Mag-Ox) 400 mg (241.3 mg magnesium) tablet, Take 1 tablet by mouth once daily., Disp: , Rfl:     methylphenidate (Ritalin) 20 mg tablet, TAKE 1 TABLET BY MOUTH 3 TIMES PER DAY (TAKE ON EMPTY STOMACH), Disp: , Rfl:     methylPREDNISolone (Medrol Dospak) 4 mg tablets, Take as directed on package., Disp: 21 tablet, Rfl: 0    metoprolol tartrate (Lopressor) 25 mg tablet, Take 1 tablet (25 mg) by mouth 2 times a day., Disp: 180 tablet, Rfl: 3    rOPINIRole (Requip) 2 mg tablet, Take 1 tablet (2 mg) by mouth once daily at bedtime., Disp: , Rfl:     SUMAtriptan (Imitrex) 100 mg tablet, TAKE ONE TABLET BY MOUTH IF NEEDED FOR MIGRAINES. MAY REPEAT IN 2 HOURS. MAXIMUM DOSE OF 4 PER WEEK (Patient not taking: Reported on 7/7/2025), Disp: , Rfl:     topiramate (Topamax) 100 mg tablet, Take 1 tablet (100 mg) by mouth 2 times a day., Disp: , Rfl:              Results:     The following labs, imaging, and results were personally reviewed and demonstrated:    Labs:  Lab  "Results   Component Value Date    HGBA1C 5.4 06/22/2022           Lab Results   Component Value Date    CKTOTAL 38 10/19/2020     No results found for: \"SPEP\", \"BETA2\"  CBC:   Lab Results   Component Value Date    WBC 10.2 08/26/2021    HGB 16.2 (H) 08/26/2021    HCT 50.2 (H) 08/26/2021     (H) 08/26/2021     BMP:   Lab Results   Component Value Date     08/26/2021    K 4.2 08/26/2021     08/26/2021    CO2 25 08/26/2021    BUN 12 08/26/2021    CREATININE 0.95 08/26/2021    CALCIUM 9.7 08/26/2021    MG 1.90 10/21/2020    PHOS 4.4 10/20/2020     LFT:   Lab Results   Component Value Date    ALKPHOS 59 08/26/2021    BILITOT 0.5 08/26/2021    BILIDIR 0.1 12/27/2019    PROT 7.3 08/26/2021    ALBUMIN 4.2 08/26/2021    ALT 11 08/26/2021    AST 10 08/26/2021             Physical Exam:     General Physical Exam:  BP (!) 153/99 (Patient Position: Sitting)   Pulse 90   Ht 1.676 m (5' 6\")   BMI 26.50 kg/m²      She is not in any acute distress.    Musculoskeletal: No scoliosis, lordosis, kyphosis, pes cavus, or hammertoes.     Discomfort observed in left knee, left shoulder, neck extension and rotation.       Neurological Exam:   Mental status reveals: alert and oriented to person, place, and date. Speech is intact to conversation. Fund of knowledge is normal.     Cranial nerves:  CN 2   Visual fields full to confrontation.   CN 3, 4, 6   Pupils round, 4 mm in diameter, equally reactive to light. Lids symmetric; no ptosis. EOMs normal alignment, full range.   No nystagmus.   CN 5   Facial sensation intact bilaterally.   CN 7   Normal and symmetric facial strength. Nasolabial folds symmetric.   CN 8   Hearing intact to conversation.   CN 9   Palate elevates symmetrically.   CN 11   Normal strength of shoulder shrug and neck turning.   CN 12   Tongue midline, with normal bulk and strength; no fasciculations.      Motor:  Proximal weakness in bilateral lower and upper extremities, otherwise muscle bulk and " tone are normal. There are no scapular winging, fasciculations, tremor or other abnormal movement.    Neck flexion and neck extension are normal (MRC 5/5).         Reflexes:   R L    2 2 Biceps    2 2 Brachioradialis    2 2 Triceps    2 2 Patellar   2 2 Achilles     Plantars: toes downgoing to plantar stimulation. No clonus or other pathologic reflexes present.      Sensory:   Pinprick sensation and touch sensation are normal and symmetrical.  Position senses are normal at the toes.  Vibration senses are normal at the toes and ankles.  All sensory modalities were normal in the hands and upper extremities.       Coordination:  Finger-nose-finger is normal without dysmetria or overshoot and heel-to-shin is normal. Rapid alternating movements in hands and feet are normal.     Gait:  Station is stable with a normal base. Gait is stable with a normal arm swing and speed. There is no ataxia, shuffling, steppage or waddling gait. Tandem gait is intact. Romberg sign is negative.      Impression/Plan:         Assessment & Plan     Yasmin Rodriguez is a 54 y.o. who presents with:    Symptomatic Orthostatic Hypotension    - Assessment:    Patient has a 5-year history of autonomic orthostatic hypotension, confirmed by failed tilt table test. Symptoms include episodes of losing consciousness, lightheadedness, and significant blood pressure fluctuations (e.g., dropping from 129/70 to 79/60). Previous management included abdominal bandage (not obtained due to insurance issues), hydration, and exercise. Current management includes metoprolol 25 mg BID (skipped when BP is low), salt intake, abdominal binders, and compression stockings. Echocardiogram (2020) showed mild to moderate asymmetrical left ventricular hypertrophy and impaired relaxation pattern. Full autonomic testing, including valsalva maneuver and heart rate monitoring variation to deep breathing, is needed for a more conclusive evaluation of possible small  fiber neuropathy.    - Plan:    - Conduct full autonomic nervous system testing    - Continue metoprolol 25 mg PO BID, adjusting based on BP    - Increase salt intake by 3 teaspoons daily    - Use compression socks and high-waist abdominal band during the day    - Increase water intake to 3 liters daily    - Monitor blood pressure and heart rate 3 times daily    - Elevate head of bed to 45 degrees    - No driving due to syncopal episodes (to be documented in writing)    -Optimize the treatment of Obstructive Sleep Apnea, Chronic Fatigue Syndrome, ADHD and Fibromyalgia      Chronic polyarthralgia ( including neck pain)  - Plan:    - Consider referral to rheumatologist for evaluation    - Exercise recommendations: swimming, rowing, and biking; avoid weight-bearing or prolonged standing exercises    - X-ray of neck and cervical spine    - EMG nerve conduction study on left arm and leg    -Follow up in 3 months or sooner as needed.            I personally spent [90 ] minutes on the day of the visit completing the review of the medical record and outside records, obtaining history and performing an appropriate physical exam, patient care, counseling and education, placing orders, independently reviewing results, communicating with the patient/family and other providers, coordinating care and performing appropriate clinical documentation.    Serena Rodriguez M.D.           [1]   Allergies  Allergen Reactions    Latex Unknown    Penicillins Unknown    Quetiapine Unknown

## 2025-07-07 NOTE — PATIENT INSTRUCTIONS
Please follow the following instructions  - EMG/NCS evaluation to evaluate for large fiber neuropathy  - Drink 2-3 liters of fluids/ day  -Increase salt intake up to 3 tea spoons/day  -Use compression socks and high waist during the day time  -Monitor your blood pressure and heart rate 3 times/day  -Exercise ( non weight baring) swimming.   -Raise the head of the bed ( bed elevation about 45 degrees).   - To avoid driving or operating heavy machines.     -Follow up in about 3 months or sooner as needed.

## 2025-07-10 ENCOUNTER — OFFICE VISIT (OUTPATIENT)
Age: 55
End: 2025-07-10
Payer: COMMERCIAL

## 2025-07-10 VITALS
RESPIRATION RATE: 16 BRPM | WEIGHT: 164 LBS | OXYGEN SATURATION: 98 % | HEART RATE: 101 BPM | BODY MASS INDEX: 26.47 KG/M2

## 2025-07-10 DIAGNOSIS — I10 PRIMARY HYPERTENSION: Primary | ICD-10-CM

## 2025-07-10 DIAGNOSIS — R09.89 LABILE HYPERTENSION: ICD-10-CM

## 2025-07-10 PROCEDURE — 99213 OFFICE O/P EST LOW 20 MIN: CPT

## 2025-07-10 PROCEDURE — 93005 ELECTROCARDIOGRAM TRACING: CPT

## 2025-07-10 RX ORDER — AMLODIPINE BESYLATE 5 MG/1
2.5 TABLET ORAL DAILY
Qty: 15 TABLET | Refills: 3 | Status: SHIPPED | OUTPATIENT
Start: 2025-07-10

## 2025-07-10 NOTE — PATIENT INSTRUCTIONS
Take amlodipine if blood pressure greater than 140/90  Hold metoprolol for heart rate less than 60 and blood pressure less than 100/60

## 2025-07-10 NOTE — PROGRESS NOTES
of systolic 90 In the same day that systolic is 160.  Patient with high anxiety and has autonomic reflex disease.    Patient Active Problem List   Diagnosis    Asthma exacerbation, mild    Chronic lumbar pain    Depression    Severe persistent asthma with acute exacerbation (HCC)    Moderate persistent asthma    Primary hypertension    History of syncope    Metabolic acidosis    Acute cholecystitis    Neurocardiogenic syncope    History of tobacco abuse       Past Surgical History:   Procedure Laterality Date    ANKLE FRACTURE SURGERY Left 10/9/2019    LEFT OPEN REDUCTION INTERNAL FIXATION MALLEOLUS FRACTURE performed by Wilver Rm DO at [facility] [place]    BACK SURGERY      CHOLECYSTECTOMY, LAPAROSCOPIC N/A 7/8/2024    LAPAROSCOPIC POSSIBLE OPEN  CHOLECYSTECTOMY WITH CHOLANGIOGRAM performed by Jeramie Merrill MD at [facility] [place]    KNEE SURGERY Right     2012    OTHER SURGICAL HISTORY      3 VAGINAL BIRTHS    TUBAL LIGATION      ENDOMETRIOSIS       Social History     Socioeconomic History    Marital status:    Tobacco Use    Smoking status: Every Day     Current packs/day: 0.30     Types: Cigarettes    Smokeless tobacco: Never   Vaping Use    Vaping status: Every Day   Substance and Sexual Activity    Alcohol use: Not Currently     Comment: socially    Drug use: Yes     Types: Marijuana (Weed)     Comment: THC/CBD     Social Drivers of Health     Food Insecurity: Food Insecurity Present (7/7/2024)    Hunger Vital Sign     Worried About Running Out of Food in the Last Year: Often true     Ran Out of Food in the Last Year: Often true   Transportation Needs: Unmet Transportation Needs (7/7/2024)    PRAPARE - Transportation     Lack of Transportation (Medical): Yes     Lack of Transportation (Non-Medical): Yes   Housing Stability: High Risk (7/7/2024)    Housing Stability Vital Sign     Unable to Pay for Housing in the Last Year: Yes     Number of Places Lived in the Last Year: 2     Unstable Housing in the Last

## 2025-07-17 ENCOUNTER — HOSPITAL ENCOUNTER (OUTPATIENT)
Dept: GENERAL RADIOLOGY | Age: 55
Discharge: HOME | End: 2025-07-19
Payer: COMMERCIAL

## 2025-07-17 ENCOUNTER — HOSPITAL ENCOUNTER (OUTPATIENT)
Age: 55
Discharge: HOME | End: 2025-07-19
Payer: COMMERCIAL

## 2025-07-17 ENCOUNTER — TRANSCRIBE ORDERS (OUTPATIENT)
Dept: GENERAL RADIOLOGY | Age: 55
End: 2025-07-17

## 2025-07-17 DIAGNOSIS — M54.50 LOW BACK PAIN, UNSPECIFIED BACK PAIN LATERALITY, UNSPECIFIED CHRONICITY, UNSPECIFIED WHETHER SCIATICA PRESENT: ICD-10-CM

## 2025-07-17 DIAGNOSIS — M54.2 NECK PAIN: ICD-10-CM

## 2025-07-17 DIAGNOSIS — M54.50 LOW BACK PAIN, UNSPECIFIED BACK PAIN LATERALITY, UNSPECIFIED CHRONICITY, UNSPECIFIED WHETHER SCIATICA PRESENT: Primary | ICD-10-CM

## 2025-07-17 PROCEDURE — 72050 X-RAY EXAM NECK SPINE 4/5VWS: CPT

## 2025-07-17 PROCEDURE — 72100 X-RAY EXAM L-S SPINE 2/3 VWS: CPT

## 2025-07-25 ENCOUNTER — HOSPITAL ENCOUNTER (OUTPATIENT)
Facility: CLINIC | Age: 55
Discharge: HOME | End: 2025-07-25
Payer: COMMERCIAL

## 2025-07-25 DIAGNOSIS — G56.21 ULNAR NEUROPATHY OF RIGHT UPPER EXTREMITY: Primary | ICD-10-CM

## 2025-07-25 DIAGNOSIS — G62.9 NEUROPATHY: ICD-10-CM

## 2025-07-25 PROCEDURE — 95913 NRV CNDJ TEST 13/> STUDIES: CPT | Performed by: SPECIALIST

## 2025-07-25 PROCEDURE — 95886 MUSC TEST DONE W/N TEST COMP: CPT | Performed by: SPECIALIST

## 2025-07-29 ENCOUNTER — APPOINTMENT (OUTPATIENT)
Dept: PRIMARY CARE | Facility: CLINIC | Age: 55
End: 2025-07-29
Payer: COMMERCIAL

## 2025-11-03 ENCOUNTER — APPOINTMENT (OUTPATIENT)
Dept: NEUROLOGY | Facility: CLINIC | Age: 55
End: 2025-11-03
Payer: COMMERCIAL

## (undated) DEVICE — SPONGE GZ W4XL4IN COT 12 PLY TYP VII WVN C FLD DSGN

## (undated) DEVICE — APPLIER CLP M L L11.4IN DIA10MM ENDOSCP ROT MULT FOR LIG

## (undated) DEVICE — GLOVE ORANGE PI 7   MSG9070

## (undated) DEVICE — LAPAROSCOPIC TROCAR SLEEVE/SINGLE USE: Brand: KII® OPTICAL ACCESS SYSTEM

## (undated) DEVICE — COUNTER NDL 40 COUNT HLD 70 FOAM BLK ADH W/ MAG

## (undated) DEVICE — BANDAGE ADH W0.75XL3IN UNIV WVN FAB NAT GEN USE STRP N ADH

## (undated) DEVICE — KIT ARMOR C DRP COLLAPSIBLE AND SELF EXP TOP CVR FOR FLUOROSCOPIC

## (undated) DEVICE — TUBING, SUCTION, 1/4" X 10', STRAIGHT: Brand: MEDLINE

## (undated) DEVICE — 3M™ STERI-DRAPE™ U-DRAPE 1015: Brand: STERI-DRAPE™

## (undated) DEVICE — ELECTRODE PT RET AD L9FT HI MOIST COND ADH HYDRGEL CORDED

## (undated) DEVICE — GOWN,AURORA,NONREINFORCED,LARGE: Brand: MEDLINE

## (undated) DEVICE — GLOVE SURG SZ 75 L12IN FNGR THK13MIL BRN LTX SYN POLYMER W

## (undated) DEVICE — PAD,ABDOMINAL,8"X10",ST,LF: Brand: MEDLINE

## (undated) DEVICE — 4-PORT MANIFOLD: Brand: NEPTUNE 2

## (undated) DEVICE — SUTURE MONOCRYL SZ 4-0 L27IN ABSRB UD L19MM PS-2 1/2 CIR PRIM Y426H

## (undated) DEVICE — TROCAR: Brand: KII FIOS FIRST ENTRY

## (undated) DEVICE — NEPTUNE E-SEP SMOKE EVACUATION PENCIL, COATED, 70MM BLADE, PUSH BUTTON SWITCH: Brand: NEPTUNE E-SEP

## (undated) DEVICE — GOWN,AURORA,NONRNF,XL,30/CS: Brand: MEDLINE

## (undated) DEVICE — TROCAR: Brand: KII® SLEEVE

## (undated) DEVICE — BIT DRL L125MM DIA2MM QUIK CPL W/O STP REUSE

## (undated) DEVICE — BANDAGE COMPR M W4INXL10YD WHT BGE VELC E MTRX HK AND LOOP

## (undated) DEVICE — GLOVE ORANGE PI 7 1/2   MSG9075

## (undated) DEVICE — INTENDED FOR TISSUE SEPARATION, AND OTHER PROCEDURES THAT REQUIRE A SHARP SURGICAL BLADE TO PUNCTURE OR CUT.: Brand: BARD-PARKER ® CARBON RIB-BACK BLADES

## (undated) DEVICE — PENCIL SMOKE EVAC PUSH BUTTON COATED

## (undated) DEVICE — C-ARM: Brand: UNBRANDED

## (undated) DEVICE — BIT DRL L110MM DIA2.5MM G QUIK CPL W/O STP REUSE

## (undated) DEVICE — Device

## (undated) DEVICE — MARKER SURG SKIN GENTIAN VLT REG TIP W/ 6IN RUL

## (undated) DEVICE — SUTURE VICRYL + SZ 0 L27IN ABSRB VLT L26MM UR-6 5/8 CIR VCP603H

## (undated) DEVICE — TISSUE RETRIEVAL SYSTEM: Brand: INZII RETRIEVAL SYSTEM

## (undated) DEVICE — ELECTRODE LAP L36CM PTFE WIRE J HK CLEANCOAT

## (undated) DEVICE — OPEN-END URETERAL CATHETER: Brand: COOK

## (undated) DEVICE — GENERAL LAPAROSCOPY: Brand: MEDLINE INDUSTRIES, INC.

## (undated) DEVICE — STERILE COTTON BALLS LARGE 5/P: Brand: MEDLINE

## (undated) DEVICE — STOPCOCK 3-WAY 45 PSI LOW OFF ROT COLAR

## (undated) DEVICE — BIT DRL L125MM DIA2.7MM QUIK CPL 3 FLUT

## (undated) DEVICE — SYRINGE MED 30ML STD CLR PLAS LUERLOCK TIP N CTRL DISP

## (undated) DEVICE — SUTURE VCRL SZ 2-0 L36IN ABSRB UD L36MM CT-1 1/2 CIR J945H

## (undated) DEVICE — PADDING CAST W4INXL4YD HIGHLY ABSRB THAN COT EZ APPL

## (undated) DEVICE — CHLORAPREP 26ML ORANGE

## (undated) DEVICE — SYRINGE IRRIG 60ML SFT PLIABLE BLB EZ TO GRP 1 HND USE W/

## (undated) DEVICE — SPONGE,LAP,18"X18",DLX,XR,ST,5/PK,40/PK: Brand: MEDLINE

## (undated) DEVICE — CATHETER IV 12GA L76MM EXTN DIA2.8MM FEP POLYMER THRM

## (undated) DEVICE — PADDING UNDERCAST W4INXL12FT RAYON POLY SYN NONADHESIVE

## (undated) DEVICE — STRIP,CLOSURE,WOUND,MEDI-STRIP,1/2X4: Brand: MEDLINE

## (undated) DEVICE — SUTURE ETHLN SZ 3-0 L18IN NONABSORBABLE BLK PS-2 L19MM 3/8 1669H

## (undated) DEVICE — DRESSING PETRO W3XL8IN OIL EMUL N ADH GZ KNIT IMPREG CELOS

## (undated) DEVICE — PACK ARTHRO III ST SIRUS

## (undated) DEVICE — BANDAGE ADH W2XL4IN NITRL FAB STRP CURAD